# Patient Record
Sex: FEMALE | Race: BLACK OR AFRICAN AMERICAN | Employment: OTHER | URBAN - METROPOLITAN AREA
[De-identification: names, ages, dates, MRNs, and addresses within clinical notes are randomized per-mention and may not be internally consistent; named-entity substitution may affect disease eponyms.]

---

## 2017-02-21 ENCOUNTER — OP HISTORICAL/CONVERTED ENCOUNTER (OUTPATIENT)
Dept: OTHER | Age: 77
End: 2017-02-21

## 2017-04-04 ENCOUNTER — ED HISTORICAL/CONVERTED ENCOUNTER (OUTPATIENT)
Dept: OTHER | Age: 77
End: 2017-04-04

## 2017-04-19 ENCOUNTER — OP HISTORICAL/CONVERTED ENCOUNTER (OUTPATIENT)
Dept: OTHER | Age: 77
End: 2017-04-19

## 2017-08-17 ENCOUNTER — OP HISTORICAL/CONVERTED ENCOUNTER (OUTPATIENT)
Dept: OTHER | Age: 77
End: 2017-08-17

## 2018-04-06 ENCOUNTER — ED HISTORICAL/CONVERTED ENCOUNTER (OUTPATIENT)
Dept: OTHER | Age: 78
End: 2018-04-06

## 2018-04-12 ENCOUNTER — OP HISTORICAL/CONVERTED ENCOUNTER (OUTPATIENT)
Dept: OTHER | Age: 78
End: 2018-04-12

## 2018-04-29 ENCOUNTER — IP HISTORICAL/CONVERTED ENCOUNTER (OUTPATIENT)
Dept: OTHER | Age: 78
End: 2018-04-29

## 2018-05-24 ENCOUNTER — OP HISTORICAL/CONVERTED ENCOUNTER (OUTPATIENT)
Dept: OTHER | Age: 78
End: 2018-05-24

## 2018-05-25 ENCOUNTER — OP HISTORICAL/CONVERTED ENCOUNTER (OUTPATIENT)
Dept: OTHER | Age: 78
End: 2018-05-25

## 2018-06-13 ENCOUNTER — OP HISTORICAL/CONVERTED ENCOUNTER (OUTPATIENT)
Dept: OTHER | Age: 78
End: 2018-06-13

## 2018-08-23 ENCOUNTER — OP HISTORICAL/CONVERTED ENCOUNTER (OUTPATIENT)
Dept: OTHER | Age: 78
End: 2018-08-23

## 2018-10-13 ENCOUNTER — ED HISTORICAL/CONVERTED ENCOUNTER (OUTPATIENT)
Dept: OTHER | Age: 78
End: 2018-10-13

## 2019-01-16 ENCOUNTER — ED HISTORICAL/CONVERTED ENCOUNTER (OUTPATIENT)
Dept: OTHER | Age: 79
End: 2019-01-16

## 2019-03-02 ENCOUNTER — ED HISTORICAL/CONVERTED ENCOUNTER (OUTPATIENT)
Dept: OTHER | Age: 79
End: 2019-03-02

## 2019-08-22 ENCOUNTER — ED HISTORICAL/CONVERTED ENCOUNTER (OUTPATIENT)
Dept: OTHER | Age: 79
End: 2019-08-22

## 2019-12-09 ENCOUNTER — ED HISTORICAL/CONVERTED ENCOUNTER (OUTPATIENT)
Dept: OTHER | Age: 79
End: 2019-12-09

## 2020-11-24 ENCOUNTER — HOSPITAL ENCOUNTER (EMERGENCY)
Age: 80
Discharge: HOME | End: 2020-11-24
Attending: EMERGENCY MEDICINE
Payer: MEDICARE

## 2020-11-24 VITALS
SYSTOLIC BLOOD PRESSURE: 150 MMHG | BODY MASS INDEX: 44.9 KG/M2 | WEIGHT: 244 LBS | HEIGHT: 62 IN | RESPIRATION RATE: 20 BRPM | TEMPERATURE: 97.9 F | DIASTOLIC BLOOD PRESSURE: 62 MMHG | HEART RATE: 62 BPM

## 2020-11-24 DIAGNOSIS — J01.90 ACUTE NON-RECURRENT SINUSITIS, UNSPECIFIED LOCATION: ICD-10-CM

## 2020-11-24 DIAGNOSIS — J30.9 ALLERGIC RHINITIS, UNSPECIFIED SEASONALITY, UNSPECIFIED TRIGGER: Primary | ICD-10-CM

## 2020-11-24 PROCEDURE — 99282 EMERGENCY DEPT VISIT SF MDM: CPT

## 2020-11-24 RX ORDER — NITROGLYCERIN 0.3 MG/1
0.3 TABLET SUBLINGUAL
COMMUNITY

## 2020-11-24 RX ORDER — LOSARTAN POTASSIUM 100 MG/1
100 TABLET ORAL DAILY
COMMUNITY
End: 2021-01-06 | Stop reason: ALTCHOICE

## 2020-11-24 RX ORDER — ACETAMINOPHEN 325 MG/1
650 TABLET ORAL
COMMUNITY
End: 2021-12-15 | Stop reason: DRUGHIGH

## 2020-11-24 RX ORDER — BISMUTH SUBSALICYLATE 262 MG
1 TABLET,CHEWABLE ORAL DAILY
COMMUNITY

## 2020-11-24 RX ORDER — PRAVASTATIN SODIUM 40 MG/1
40 TABLET ORAL DAILY
COMMUNITY
End: 2021-05-19 | Stop reason: SDUPTHER

## 2020-11-24 RX ORDER — ALBUTEROL SULFATE 0.63 MG/3ML
0.63 SOLUTION RESPIRATORY (INHALATION)
COMMUNITY
End: 2022-07-09

## 2020-11-24 RX ORDER — GABAPENTIN 100 MG/1
CAPSULE ORAL 3 TIMES DAILY
COMMUNITY
End: 2022-07-19 | Stop reason: ALTCHOICE

## 2020-11-24 RX ORDER — AZITHROMYCIN 250 MG/1
TABLET, FILM COATED ORAL
Qty: 6 TAB | Refills: 0 | Status: SHIPPED | OUTPATIENT
Start: 2020-11-24 | End: 2020-11-29

## 2020-11-24 RX ORDER — POTASSIUM CHLORIDE 750 MG/1
10 CAPSULE, EXTENDED RELEASE ORAL DAILY
COMMUNITY
End: 2021-11-15 | Stop reason: SDUPTHER

## 2020-11-24 RX ORDER — CARVEDILOL 12.5 MG/1
TABLET ORAL 2 TIMES DAILY WITH MEALS
COMMUNITY
End: 2022-06-09 | Stop reason: SDUPTHER

## 2020-11-24 RX ORDER — FLUTICASONE PROPIONATE 50 MCG
2 SPRAY, SUSPENSION (ML) NASAL DAILY
COMMUNITY

## 2020-11-24 RX ORDER — CALCIUM CARBONATE 200(500)MG
1 TABLET,CHEWABLE ORAL DAILY
COMMUNITY
End: 2022-07-19

## 2020-11-24 RX ORDER — ISOSORBIDE MONONITRATE 30 MG/1
30 TABLET, EXTENDED RELEASE ORAL DAILY
COMMUNITY

## 2020-11-24 RX ORDER — ALBUTEROL SULFATE 90 UG/1
2 AEROSOL, METERED RESPIRATORY (INHALATION)
COMMUNITY
End: 2022-04-07 | Stop reason: SDUPTHER

## 2020-11-24 RX ORDER — OMEPRAZOLE 20 MG/1
20 CAPSULE, DELAYED RELEASE ORAL DAILY
COMMUNITY
End: 2021-01-14 | Stop reason: SDUPTHER

## 2020-11-24 RX ORDER — BUDESONIDE AND FORMOTEROL FUMARATE DIHYDRATE 80; 4.5 UG/1; UG/1
2 AEROSOL RESPIRATORY (INHALATION) 2 TIMES DAILY
COMMUNITY

## 2020-11-24 RX ORDER — ALLOPURINOL 300 MG/1
300 TABLET ORAL DAILY
COMMUNITY
End: 2022-01-06 | Stop reason: SDUPTHER

## 2020-11-24 RX ORDER — GUAIFENESIN 100 MG/5ML
81 LIQUID (ML) ORAL DAILY
COMMUNITY

## 2020-11-24 RX ORDER — AMMONIUM LACTATE 12 G/100G
LOTION TOPICAL AS NEEDED
COMMUNITY
End: 2022-04-07

## 2020-11-24 NOTE — ED TRIAGE NOTES
Sinus congestion, and drainage since Nov 11, has been using symbicort and flonase; worse in the mornings when she wakes up; dry cough; scratchy throat; no fever

## 2020-11-24 NOTE — ED PROVIDER NOTES
EMERGENCY DEPARTMENT HISTORY AND PHYSICAL EXAM      Date: 11/24/2020  Patient Name: Estela Davis    History of Presenting Illness     Chief Complaint   Patient presents with    Nasal Congestion       History Provided By: Patient    HPI: Estela Davis, [de-identified] y.o. female with a past medical history significant for multiple medical problems including HTN and allergic rhinitis presents to the ED with cc of sinus pain and post nasal drip for 2 weeks. She is taking multiple medications without relief, including Flonase spray, Zyrtec, over the counter decongestants, and saline nasal rinses. She denies any headache, dizziness, visual change, fever, chest pain or shortness of breath. PCP: No primary care provider on file. No current facility-administered medications on file prior to encounter. Current Outpatient Medications on File Prior to Encounter   Medication Sig Dispense Refill    acetaminophen (TylenoL) 325 mg tablet Take 650 mg by mouth every four (4) hours as needed for Pain.  albuterol (ACCUNEB) 0.63 mg/3 mL nebulizer solution 0.63 mg by Nebulization route every six (6) hours as needed for Wheezing.  albuterol (ProAir HFA) 90 mcg/actuation inhaler Take 2 Puffs by inhalation every four (4) hours as needed for Wheezing.  allopurinoL (ZYLOPRIM) 300 mg tablet Take 300 mg by mouth daily.  ammonium lactate (LAC-HYDRIN) 12 % lotion Apply  to affected area as needed. rub in to affected area well      aspirin 81 mg chewable tablet Take 81 mg by mouth daily.  budesonide-formoteroL (Symbicort) 80-4.5 mcg/actuation HFAA Take 2 Puffs by inhalation two (2) times a day.  calcium carbonate (TUMS) 200 mg calcium (500 mg) chew Take 1 Tab by mouth daily.  carvediloL (Coreg) 12.5 mg tablet Take  by mouth two (2) times daily (with meals).  fluticasone propionate (Flonase Allergy Relief) 50 mcg/actuation nasal spray 2 Sprays by Both Nostrils route daily.       gabapentin (NEURONTIN) 100 mg capsule Take  by mouth three (3) times daily.  losartan 50 mg tab 100 mg, hydroCHLOROthiazide 25 mg tab 25 mg Take 1 Dose by mouth daily.  isosorbide mononitrate ER (IMDUR) 30 mg tablet Take 30 mg by mouth daily.  losartan (COZAAR) 100 mg tablet Take 100 mg by mouth daily.  multivitamin (ONE A DAY) tablet Take 1 Tab by mouth daily.  nitroglycerin (Nitrostat) 0.3 mg SL tablet 0.3 mg by SubLINGual route every five (5) minutes as needed for Chest Pain.  omeprazole (PRILOSEC) 20 mg capsule Take 20 mg by mouth daily.  potassium chloride SA (MICRO-K) 10 mEq capsule Take 10 mEq by mouth daily.  pravastatin (PRAVACHOL) 40 mg tablet Take 40 mg by mouth daily.  psyllium (METAMUCIL) powd Take 525 mg by mouth daily. Past History     Past Medical History:  Past Medical History:   Diagnosis Date    Allergic rhinitis     Angina pectoris (HCC)     Arthritis     Cardiac murmur     Chronic ischemic heart disease     Diabetes (HCC)     Edema of left lower leg     GERD (gastroesophageal reflux disease)     Hyperlipidemia     Hypertension     Knee pain     Left tibialis posterior tendinitis     Lumbar radiculopathy     Morbid obesity (HCC)     Osteoarthritis     Sarcoidosis     Shortness of breath     Spondylosis        Past Surgical History:  History reviewed. No pertinent surgical history. Family History:  History reviewed. No pertinent family history. Social History:  Social History     Tobacco Use    Smoking status: Never Smoker    Smokeless tobacco: Never Used   Substance Use Topics    Alcohol use: Never     Frequency: Never    Drug use: Never       Allergies: Allergies   Allergen Reactions    Clindamycin Seizures         Review of Systems   Review of Systems   Constitutional: Negative for fever. Respiratory: Negative for cough and shortness of breath. Neurological: Negative for headaches.    All other systems reviewed and are negative. Physical Exam   Physical Exam  Vitals signs and nursing note reviewed. Constitutional:       Appearance: Normal appearance. She is not ill-appearing. HENT:      Head: Normocephalic and atraumatic. Right Ear: Tympanic membrane and ear canal normal.      Left Ear: Tympanic membrane and ear canal normal.      Nose: Nose normal.      Mouth/Throat:      Mouth: Mucous membranes are moist.      Pharynx: No oropharyngeal exudate or posterior oropharyngeal erythema. Eyes:      General: No scleral icterus. Extraocular Movements: Extraocular movements intact. Pupils: Pupils are equal, round, and reactive to light. Neck:      Musculoskeletal: Normal range of motion and neck supple. No neck rigidity. Cardiovascular:      Rate and Rhythm: Normal rate and regular rhythm. Pulses: Normal pulses. Heart sounds: Normal heart sounds. Pulmonary:      Effort: Pulmonary effort is normal.      Breath sounds: Normal breath sounds. No wheezing, rhonchi or rales. Abdominal:      General: Bowel sounds are normal.      Palpations: Abdomen is soft. Tenderness: There is no abdominal tenderness. Musculoskeletal: Normal range of motion. Right lower leg: No edema. Left lower leg: No edema. Lymphadenopathy:      Cervical: No cervical adenopathy. Skin:     General: Skin is warm and dry. Findings: No rash. Neurological:      General: No focal deficit present. Mental Status: She is alert and oriented to person, place, and time. Comments: Nl gross motor/sensory exam without any focal or lateralizing deficits   Psychiatric:         Mood and Affect: Mood normal.         Behavior: Behavior normal.         Diagnostic Study Results     Labs -   No results found for this or any previous visit (from the past 12 hour(s)).     Radiologic Studies -   No orders to display     CT Results  (Last 48 hours)    None        CXR Results  (Last 48 hours)    None            Medical Decision Making   I am the first provider for this patient. I reviewed the vital signs, available nursing notes, past medical history, past surgical history, family history and social history. Vital Signs-Reviewed the patient's vital signs. Patient Vitals for the past 12 hrs:   Temp Pulse Resp BP   11/24/20 1014 97.9 °F (36.6 °C) 62 20 (!) 150/62       Records Reviewed: Nursing Notes    Provider Notes (Medical Decision Making):   Pt with benign exam.  She has had 2 weeks of symptoms with no relief with multiple medications. ED Course:   Initial assessment performed. The patients presenting problems have been discussed, and they are in agreement with the care plan formulated and outlined with them. I have encouraged them to ask questions as they arise throughout their visit. Rx for Z pack, continue sx treatment. Discussed worrisome symptoms to return for and ENT referral.    PLAN:  1. Current Discharge Medication List      START taking these medications    Details   azithromycin (Zithromax Z-Ruben) 250 mg tablet Take as per package instructions  Qty: 6 Tab, Refills: 0           2. Follow-up Information     Follow up With Specialties Details Why Contact Info    Waldo Hayward MD Otolaryngology   [address] [place] [address]  [phone]          Return to ED if worse     Diagnosis     Clinical Impression:   1. Allergic rhinitis, unspecified seasonality, unspecified trigger    2.  Acute non-recurrent sinusitis, unspecified location

## 2021-01-06 ENCOUNTER — OFFICE VISIT (OUTPATIENT)
Dept: INTERNAL MEDICINE CLINIC | Age: 81
End: 2021-01-06
Payer: MEDICARE

## 2021-01-06 ENCOUNTER — OFFICE VISIT (OUTPATIENT)
Dept: PODIATRY | Age: 81
End: 2021-01-06
Payer: MEDICARE

## 2021-01-06 VITALS
WEIGHT: 249.2 LBS | HEIGHT: 61 IN | HEART RATE: 78 BPM | BODY MASS INDEX: 47.05 KG/M2 | OXYGEN SATURATION: 97 % | TEMPERATURE: 95.1 F | SYSTOLIC BLOOD PRESSURE: 147 MMHG | DIASTOLIC BLOOD PRESSURE: 74 MMHG

## 2021-01-06 VITALS
DIASTOLIC BLOOD PRESSURE: 80 MMHG | HEIGHT: 61 IN | OXYGEN SATURATION: 98 % | SYSTOLIC BLOOD PRESSURE: 150 MMHG | BODY MASS INDEX: 46.82 KG/M2 | HEART RATE: 72 BPM | WEIGHT: 248 LBS | RESPIRATION RATE: 18 BRPM

## 2021-01-06 DIAGNOSIS — M1A.9XX0 CHRONIC GOUT WITHOUT TOPHUS, UNSPECIFIED CAUSE, UNSPECIFIED SITE: ICD-10-CM

## 2021-01-06 DIAGNOSIS — J30.9 ALLERGIC RHINITIS, UNSPECIFIED SEASONALITY, UNSPECIFIED TRIGGER: ICD-10-CM

## 2021-01-06 DIAGNOSIS — M54.16 LUMBAR RADICULOPATHY: ICD-10-CM

## 2021-01-06 DIAGNOSIS — J45.20 MILD INTERMITTENT ASTHMA WITHOUT COMPLICATION: ICD-10-CM

## 2021-01-06 DIAGNOSIS — E55.9 VITAMIN D DEFICIENCY: ICD-10-CM

## 2021-01-06 DIAGNOSIS — D86.9 SARCOIDOSIS: ICD-10-CM

## 2021-01-06 DIAGNOSIS — E78.00 PURE HYPERCHOLESTEROLEMIA: ICD-10-CM

## 2021-01-06 DIAGNOSIS — B35.1 ONYCHOMYCOSIS: ICD-10-CM

## 2021-01-06 DIAGNOSIS — E66.01 MORBID OBESITY (HCC): ICD-10-CM

## 2021-01-06 DIAGNOSIS — M25.572 CHRONIC PAIN OF LEFT ANKLE: Primary | ICD-10-CM

## 2021-01-06 DIAGNOSIS — I25.9 ISCHEMIC HEART DISEASE: ICD-10-CM

## 2021-01-06 DIAGNOSIS — G89.29 CHRONIC PAIN OF LEFT ANKLE: Primary | ICD-10-CM

## 2021-01-06 DIAGNOSIS — K21.9 GASTROESOPHAGEAL REFLUX DISEASE WITHOUT ESOPHAGITIS: ICD-10-CM

## 2021-01-06 DIAGNOSIS — Z12.31 ENCOUNTER FOR SCREENING MAMMOGRAM FOR BREAST CANCER: ICD-10-CM

## 2021-01-06 DIAGNOSIS — M15.9 OSTEOARTHRITIS OF MULTIPLE JOINTS, UNSPECIFIED OSTEOARTHRITIS TYPE: ICD-10-CM

## 2021-01-06 DIAGNOSIS — I10 ESSENTIAL HYPERTENSION: ICD-10-CM

## 2021-01-06 DIAGNOSIS — R73.03 PREDIABETES: ICD-10-CM

## 2021-01-06 PROCEDURE — 99214 OFFICE O/P EST MOD 30 MIN: CPT | Performed by: INTERNAL MEDICINE

## 2021-01-06 PROCEDURE — 29540 STRAPPING ANKLE &/FOOT: CPT | Performed by: PODIATRIST

## 2021-01-06 PROCEDURE — 99204 OFFICE O/P NEW MOD 45 MIN: CPT | Performed by: PODIATRIST

## 2021-01-06 PROCEDURE — 11755 BIOPSY NAIL UNIT: CPT | Performed by: PODIATRIST

## 2021-01-06 RX ORDER — DICLOFENAC SODIUM 10 MG/G
4 GEL TOPICAL 4 TIMES DAILY
Qty: 450 G | Refills: 2 | Status: SHIPPED | OUTPATIENT
Start: 2021-01-06 | End: 2021-04-14 | Stop reason: SDUPTHER

## 2021-01-06 RX ORDER — LOSARTAN POTASSIUM AND HYDROCHLOROTHIAZIDE 25; 100 MG/1; MG/1
TABLET ORAL
COMMUNITY
Start: 2020-10-10 | End: 2021-05-03 | Stop reason: SDUPTHER

## 2021-01-06 NOTE — PROGRESS NOTES
Stan Beltre is a [de-identified] y.o. female and presents with Follow Up Chronic Condition (htn ) and Other (drainage, congestion)    Ms. Jennifer Meier came for to reestablish and for follow-up almost after 2 years. She is brought by her niece. she does not drive. According to her in the interval time she was living in [place] with her friend due to some personal problems and now she has started living with her son. She has multiple medical problems. She brought the list of medicines. She follows Dr. Arslan Rojas cardiologist once a year. She is a known case of ischemic heart disease with coronary artery disease and having asthma with history of sarcoidosis but her sarcoidosis is quiet. Her blood pressure is elevated because she has not taken any of her medicines yet. She has hypertension with history of gout and she has nebulizer machine and using rescue and maintenance inhaler. Recently no flareup of gout. She is using fluticasone nasal spray and cetirizine. Recently she visited emergency room 3 weeks ago for cough and she was prescribed azithromycin. She has history of lumbar radiculopathy and DJD also had history of prediabetes with hemoglobin A1c 6.6 at 1 time in the past.  She has not done any labs for last 1 year. Labs ordered. She wants to do mammogram.  She has no depression. Her BMI is 46.8. Hypertension blood pressure readings are elevated    Review of Systems    Review of Systems   Constitutional: Negative. HENT: Negative for congestion and sore throat. Eyes: Negative for blurred vision. Respiratory: Negative for cough, shortness of breath, wheezing and stridor. Cardiovascular: Negative. Gastrointestinal: Negative. Genitourinary: Negative. Musculoskeletal: Negative. Neurological: Negative for dizziness, tingling, tremors and headaches. Psychiatric/Behavioral: Negative for depression and memory loss. The patient is not nervous/anxious and does not have insomnia.          Past Medical History:   Diagnosis Date    Allergic rhinitis     Angina pectoris (HCC)     Arthritis     Cardiac murmur     Chronic ischemic heart disease     Diabetes (HCC)     Edema of left lower leg     GERD (gastroesophageal reflux disease)     Hyperlipidemia     Hypertension     Knee pain     Left tibialis posterior tendinitis     Lumbar radiculopathy     Morbid obesity (HCC)     Osteoarthritis     Sarcoidosis     Shortness of breath     Spondylosis      Past Surgical History:   Procedure Laterality Date    HX CHOLECYSTECTOMY      HX CYST REMOVAL      IR CHOLECYSTOSTOMY PERCUTANEOUS       Social History     Socioeconomic History    Marital status:      Spouse name: Not on file    Number of children: Not on file    Years of education: Not on file    Highest education level: Not on file   Tobacco Use    Smoking status: Never Smoker    Smokeless tobacco: Never Used   Substance and Sexual Activity    Alcohol use: Never     Frequency: Never    Drug use: Never     Family History   Family history unknown: Yes     Current Outpatient Medications   Medication Sig Dispense Refill    losartan-hydroCHLOROthiazide (HYZAAR) 100-25 mg per tablet TAKE 1 TABLET BY MOUTH ONCE DAILY FOR 90 DAYS      diclofenac (VOLTAREN) 1 % gel Apply 4 g to affected area four (4) times daily. 450 g 2    acetaminophen (TylenoL) 325 mg tablet Take 650 mg by mouth every four (4) hours as needed for Pain.  albuterol (ACCUNEB) 0.63 mg/3 mL nebulizer solution 0.63 mg by Nebulization route every six (6) hours as needed for Wheezing.  albuterol (ProAir HFA) 90 mcg/actuation inhaler Take 2 Puffs by inhalation every four (4) hours as needed for Wheezing.  allopurinoL (ZYLOPRIM) 300 mg tablet Take 300 mg by mouth daily.  ammonium lactate (LAC-HYDRIN) 12 % lotion Apply  to affected area as needed. rub in to affected area well      aspirin 81 mg chewable tablet Take 81 mg by mouth daily.       budesonide-formoteroL (Symbicort) 80-4.5 mcg/actuation HFAA Take 2 Puffs by inhalation two (2) times a day.  calcium carbonate (TUMS) 200 mg calcium (500 mg) chew Take 1 Tab by mouth daily.  carvediloL (Coreg) 12.5 mg tablet Take  by mouth two (2) times daily (with meals).  fluticasone propionate (Flonase Allergy Relief) 50 mcg/actuation nasal spray 2 Sprays by Both Nostrils route daily.  gabapentin (NEURONTIN) 100 mg capsule Take  by mouth three (3) times daily.  isosorbide mononitrate ER (IMDUR) 30 mg tablet Take 30 mg by mouth daily.  multivitamin (ONE A DAY) tablet Take 1 Tab by mouth daily.  nitroglycerin (Nitrostat) 0.3 mg SL tablet 0.3 mg by SubLINGual route every five (5) minutes as needed for Chest Pain.  omeprazole (PRILOSEC) 20 mg capsule Take 20 mg by mouth daily.  potassium chloride SA (MICRO-K) 10 mEq capsule Take 10 mEq by mouth daily.  pravastatin (PRAVACHOL) 40 mg tablet Take 40 mg by mouth daily.  psyllium (METAMUCIL) powd Take 525 mg by mouth daily. Allergies   Allergen Reactions    Clindamycin Seizures       Objective:  Visit Vitals  BP (!) 150/80 (BP 1 Location: Right arm, BP Patient Position: Sitting)   Pulse 72   Resp 18   Ht 5' 1\" (1.549 m)   Wt 248 lb (112.5 kg)   SpO2 98%   BMI 46.86 kg/m²       Physical Exam:   Constitutional: General Appearance: . Level of Distress: NAD. Psychiatric: Mental Status: normal mood and affect Orientation: to time, place, and person. ,normal eye contact. Head: Head: normocephalic and atraumatic. Eyes: Pupils: PERRLA. Sclerae: non-icteric. Neck: Neck: supple, trachea midline, and no masses. Lymph Nodes: no cervical LAD. Thyroid: no enlargement or nodules and non-tender. Lungs: Respiratory effort: no dyspnea. Auscultation: no wheezing, rales/crackles, or rhonchi and breath sounds normal and good air movement. Cardiovascular: Apical Impulse: not displaced.  Heart Auscultation: normal S1 and S2; no murmurs, rubs, or gallops; and RRR. Neck vessels: no carotid bruits. Pulses including femoral / pedal: normal throughout. Abdomen: Bowel Sounds: normal. Inspection and Palpation: no tenderness, guarding, or masses and soft and non-distended. Liver: non-tender and no hepatomegaly. Spleen: non-tender and no splenomegaly. Musculoskeletal[de-identified] Extremities: no edema,no varicosities. No Calf tenderness. Neurologic: Gait and Station: normal gait and station. Motor Strength normal right and left. Skin: Inspection and palpation: no rash, lesions, or ulcer. No results found for this or any previous visit. Assessment/Plan:      ICD-10-CM ICD-9-CM    1. Essential hypertension  I10 401.9 CBC WITH AUTOMATED DIFF      METABOLIC PANEL, COMPREHENSIVE      URINALYSIS W/ RFLX MICROSCOPIC      TSH 3RD GENERATION   2. Pure hypercholesterolemia  E78.00 272.0 LIPID PANEL   3. Mild intermittent asthma without complication  A83.19 076.29    4. Chronic gout without tophus, unspecified cause, unspecified site  M1A. 9XX0 274.02 URIC ACID   5. Allergic rhinitis, unspecified seasonality, unspecified trigger  J30.9 477.9    6. Gastroesophageal reflux disease without esophagitis  K21.9 530.81    7. Ischemic heart disease  I25.9 414.9    8. Prediabetes  R73.03 790.29 HEMOGLOBIN A1C WITH EAG   9. Osteoarthritis of multiple joints, unspecified osteoarthritis type  M15.9 715.89    10. Morbid obesity ([address]  E66.01 278.01    11. Lumbar radiculopathy  M54.16 724.4    12. Sarcoidosis  D86.9 135    13. Vitamin D deficiency  E55.9 268.9 VITAMIN D, 25 HYDROXY   14.  Encounter for screening mammogram for breast cancer  Z12.31 V76.12 KELVIN 3D GALO W MAMMO BI SCREENING INCL CAD     Orders Placed This Encounter    KELVIN 3D GALO W MAMMO BI SCREENING INCL CAD     Standing Status:   Future     Standing Expiration Date:   7/6/2021    CBC WITH AUTOMATED DIFF    METABOLIC PANEL, COMPREHENSIVE    LIPID PANEL    HEMOGLOBIN A1C WITH EAG    URINALYSIS W/ RFLX MICROSCOPIC    TSH 3RD GENERATION    VITAMIN D, 25 HYDROXY    URIC ACID       Hypertension her blood pressure reading is elevated she has not taken her medicine,. Today she had to come here so she has not taken her medicine. Continue carvedilol 12.5 mg twice a day. Continue losartan hydrochlorothiazide 100/25 mg once a day. Hypercholesterolemia continue pravastatin 40 mg at bedtime. CAD with history of atherosclerotic heart disease under care of cardiologist Dr. Alan Ponce and follows her once a year. Continue isosorbide mononitrate ER 30 mg once a day. Heart healthy diet. GERD continue omeprazole. Gout continue allopurinol 300 mg once a day. Labs ordered. Diet low in purine. Asthma mild intermittent no recent flareup continue rescue and maintenance inhaler and she has nebulizer machine. History of DJD in the back and lumbar radiculopathy taking gabapentin. History of hypokalemia taking potassium supplementation. History of constipation,She does not want to take flu vaccine. Continue laxative and diet rich in fibers. Fasting labs ordered. Awaiting notes from cardiologist.  Follow-up in 3 months. Screening mammogram ordered. lose weight, follow low fat diet, follow low salt diet, continue present plan, routine labs ordered, call if any problems    There are no Patient Instructions on file for this visit. Follow-up and Dispositions    · Return in about 3 months (around 4/6/2021) for multiple comorbidities ,fasting labs now and mammo.

## 2021-01-06 NOTE — PROGRESS NOTES
[facility]    Subjective:         Patient is a [de-identified] y.o. female who is being seen as a new pt for left ankle pain and thick/discolored toenails. Patient states she has been suffering from ankle pain for the past few months. States the pain rises to the level of 3 out of 10. She denies any trauma to the area. She states the pain is sharp in nature and shoots up the lateral side of her leg. States she does not take any medication for pain management. She states imaging has not been performed to confirm a diagnosis. As far as her toenails, she also confirms that testing has never been performed to confirm a diagnosis. She states she tried multiple over-the-counter medications without relief of her symptoms. She denies any systemic signs of infection. She denies any other pedal complaints    Past Medical History:   Diagnosis Date    Allergic rhinitis     Angina pectoris (HCC)     Arthritis     Cardiac murmur     Chronic ischemic heart disease     Diabetes ([address]     Edema of left lower leg     GERD (gastroesophageal reflux disease)     Hyperlipidemia     Hypertension     Knee pain     Left tibialis posterior tendinitis     Lumbar radiculopathy     Morbid obesity (HCC)     Osteoarthritis     Sarcoidosis     Shortness of breath     Spondylosis      Past Surgical History:   Procedure Laterality Date    HX CHOLECYSTECTOMY      HX CYST REMOVAL      IR CHOLECYSTOSTOMY PERCUTANEOUS         Family History   Family history unknown: Yes      Social History     Tobacco Use    Smoking status: Never Smoker    Smokeless tobacco: Never Used   Substance Use Topics    Alcohol use: Never     Frequency: Never     Allergies   Allergen Reactions    Clindamycin Seizures     Prior to Admission medications    Medication Sig Start Date End Date Taking?  Authorizing Provider   losartan-hydroCHLOROthiazide (HYZAAR) 100-25 mg per tablet TAKE 1 TABLET BY MOUTH ONCE DAILY FOR 90 DAYS 10/10/20 Yes Provider, Renny   diclofenac (VOLTAREN) 1 % gel Apply 4 g to affected area four (4) times daily. 1/6/21  Yes Barry Bearden DPM   acetaminophen (TylenoL) 325 mg tablet Take 650 mg by mouth every four (4) hours as needed for Pain. Yes Navarro, MD Neda   albuterol (ACCUNEB) 0.63 mg/3 mL nebulizer solution 0.63 mg by Nebulization route every six (6) hours as needed for Wheezing. Yes Navarro, MD Neda   albuterol (ProAir HFA) 90 mcg/actuation inhaler Take 2 Puffs by inhalation every four (4) hours as needed for Wheezing. Yes Navarro, MD Neda   allopurinoL (ZYLOPRIM) 300 mg tablet Take 300 mg by mouth daily. Yes Navarro, MD Neda   ammonium lactate (LAC-HYDRIN) 12 % lotion Apply  to affected area as needed. rub in to affected area well   Yes Navarro, MD Neda   aspirin 81 mg chewable tablet Take 81 mg by mouth daily. Yes Navarro, MD Neda   budesonide-formoteroL (Symbicort) 80-4.5 mcg/actuation HFAA Take 2 Puffs by inhalation two (2) times a day. Yes Navarro, MD Neda   calcium carbonate (TUMS) 200 mg calcium (500 mg) chew Take 1 Tab by mouth daily. Yes Navarro, MD Neda   carvediloL (Coreg) 12.5 mg tablet Take  by mouth two (2) times daily (with meals). Yes Navarro, MD Neda   fluticasone propionate (Flonase Allergy Relief) 50 mcg/actuation nasal spray 2 Sprays by Both Nostrils route daily. Yes Navarro, MD Neda   gabapentin (NEURONTIN) 100 mg capsule Take  by mouth three (3) times daily. Yes Navarro, MD Neda   losartan 50 mg tab 100 mg, hydroCHLOROthiazide 25 mg tab 25 mg Take 1 Dose by mouth daily. Yes Navarro, MD Neda   isosorbide mononitrate ER (IMDUR) 30 mg tablet Take 30 mg by mouth daily. Yes Navarro, MD Neda   losartan (COZAAR) 100 mg tablet Take 100 mg by mouth daily. Yes Navarro, MD Neda   multivitamin (ONE A DAY) tablet Take 1 Tab by mouth daily. Yes Other, MD Neda   nitroglycerin (Nitrostat) 0.3 mg SL tablet 0.3 mg by SubLINGual route every five (5) minutes as needed for Chest Pain. Yes Other, MD Neda   omeprazole (PRILOSEC) 20 mg capsule Take 20 mg by mouth daily. Yes Other, MD Neda   potassium chloride SA (MICRO-K) 10 mEq capsule Take 10 mEq by mouth daily. Yes Other, MD Neda   pravastatin (PRAVACHOL) 40 mg tablet Take 40 mg by mouth daily. Yes Other, MD Neda   psyllium (METAMUCIL) powd Take 525 mg by mouth daily. Yes Other, MD Neda       Review of Systems   Constitutional: Negative. HENT: Negative. Eyes: Negative. Respiratory: Negative. Cardiovascular: Negative. Gastrointestinal: Negative. Endocrine: Negative. Genitourinary: Negative. Musculoskeletal: Positive for arthralgias. Skin: Negative. Allergic/Immunologic: Positive for environmental allergies. Neurological: Positive for numbness. Hematological: Negative. Psychiatric/Behavioral: Negative. All other systems reviewed and are negative. Objective:     Visit Vitals  BP (!) 147/74 (BP 1 Location: Left arm, BP Patient Position: Sitting)   Pulse 78   Temp (!) 95.1 °F (35.1 °C) (Oral)   Ht 5' 1\" (1.549 m)   Wt 249 lb 3.2 oz (113 kg)   SpO2 97%   BMI 47.09 kg/m²       Physical Exam  Vitals signs reviewed. Constitutional:       Appearance: She is morbidly obese. Cardiovascular:      Pulses:           Dorsalis pedis pulses are 2+ on the right side and 2+ on the left side. Posterior tibial pulses are 2+ on the right side and 2+ on the left side. Pulmonary:      Effort: Pulmonary effort is normal.   Musculoskeletal:      Right ankle: Normal.      Left ankle: She exhibits decreased range of motion and swelling. Tenderness. Right lower leg: No edema. Left lower leg: Edema present. Right foot: Normal range of motion. No deformity or bunion. Left foot: Normal range of motion. No deformity or bunion. Feet:      Right foot:      Protective Sensation: 10 sites tested. 10 sites sensed.       Skin integrity: Skin integrity normal.      Toenail Condition: Right toenails are abnormally thick. Fungal disease present. Left foot:      Protective Sensation: 10 sites tested. 10 sites sensed. Skin integrity: Skin integrity normal.      Toenail Condition: Left toenails are abnormally thick. Fungal disease present. Lymphadenopathy:      Lower Body: No right inguinal adenopathy. No left inguinal adenopathy. Skin:     General: Skin is warm. Capillary Refill: Capillary refill takes 2 to 3 seconds. Neurological:      Mental Status: She is alert and oriented to person, place, and time. Psychiatric:         Mood and Affect: Mood and affect normal.         Behavior: Behavior is cooperative. Data Review: No results found for this or any previous visit (from the past 24 hour(s)). Impression:       ICD-10-CM ICD-9-CM    1. Chronic pain of left ankle  M25.572 719.47 XR ANKLE LT MIN 3 V    G89.29 338.29    2. Onychomycosis  B35.1 110.1          Recommendation:     Patient seen and evaluated in the office  Discussed and educated patient regarding her current medical condition  An order was given for x-rays to be performed of the left ankle to interrogate the osseous structures. A foot/ankle strapping was applied with an Ace bandage for symptomatic relief. Patient to utilize as needed. A prescription was given for diclofenac 1% topical gel to be utilized up to 4 times daily for symptomatic relief of lateral ankle pain  It was determined that a nail plate biopsy were taken of the right hallux to confirm the presence of fungal/yeast elements. This was performed with a nail nipper without incident. Patient tolerated well no dressing was needed.   Informed patient that when pathology of return, will discuss treatment options in more depth

## 2021-01-07 LAB
25(OH)D3+25(OH)D2 SERPL-MCNC: 34.8 NG/ML (ref 30–100)
ALBUMIN SERPL-MCNC: 4.4 G/DL (ref 3.7–4.7)
ALBUMIN/GLOB SERPL: 1.4 {RATIO} (ref 1.2–2.2)
ALP SERPL-CCNC: 54 IU/L (ref 39–117)
ALT SERPL-CCNC: 15 IU/L (ref 0–32)
APPEARANCE UR: CLEAR
AST SERPL-CCNC: 19 IU/L (ref 0–40)
BACTERIA #/AREA URNS HPF: ABNORMAL /[HPF]
BASOPHILS # BLD AUTO: 0.1 X10E3/UL (ref 0–0.2)
BASOPHILS NFR BLD AUTO: 1 %
BILIRUB SERPL-MCNC: 0.4 MG/DL (ref 0–1.2)
BILIRUB UR QL STRIP: NEGATIVE
BUN SERPL-MCNC: 21 MG/DL (ref 8–27)
BUN/CREAT SERPL: 18 (ref 12–28)
CALCIUM SERPL-MCNC: 9.9 MG/DL (ref 8.7–10.3)
CASTS URNS QL MICRO: ABNORMAL /LPF
CHLORIDE SERPL-SCNC: 95 MMOL/L (ref 96–106)
CHOLEST SERPL-MCNC: 173 MG/DL (ref 100–199)
CO2 SERPL-SCNC: 31 MMOL/L (ref 20–29)
COLOR UR: YELLOW
CREAT SERPL-MCNC: 1.19 MG/DL (ref 0.57–1)
EOSINOPHIL # BLD AUTO: 0.4 X10E3/UL (ref 0–0.4)
EOSINOPHIL NFR BLD AUTO: 4 %
EPI CELLS #/AREA URNS HPF: ABNORMAL /HPF (ref 0–10)
ERYTHROCYTE [DISTWIDTH] IN BLOOD BY AUTOMATED COUNT: 15.6 % (ref 11.7–15.4)
EST. AVERAGE GLUCOSE BLD GHB EST-MCNC: 140 MG/DL
GLOBULIN SER CALC-MCNC: 3.1 G/DL (ref 1.5–4.5)
GLUCOSE SERPL-MCNC: 97 MG/DL (ref 65–99)
GLUCOSE UR QL: NEGATIVE
HBA1C MFR BLD: 6.5 % (ref 4.8–5.6)
HCT VFR BLD AUTO: 43.8 % (ref 34–46.6)
HDLC SERPL-MCNC: 63 MG/DL
HGB BLD-MCNC: 14 G/DL (ref 11.1–15.9)
HGB UR QL STRIP: ABNORMAL
IMM GRANULOCYTES # BLD AUTO: 0 X10E3/UL (ref 0–0.1)
IMM GRANULOCYTES NFR BLD AUTO: 0 %
KETONES UR QL STRIP: NEGATIVE
LDLC SERPL CALC-MCNC: 92 MG/DL (ref 0–99)
LEUKOCYTE ESTERASE UR QL STRIP: ABNORMAL
LYMPHOCYTES # BLD AUTO: 2.4 X10E3/UL (ref 0.7–3.1)
LYMPHOCYTES NFR BLD AUTO: 24 %
MCH RBC QN AUTO: 24.7 PG (ref 26.6–33)
MCHC RBC AUTO-ENTMCNC: 32 G/DL (ref 31.5–35.7)
MCV RBC AUTO: 77 FL (ref 79–97)
MICRO URNS: ABNORMAL
MONOCYTES # BLD AUTO: 0.8 X10E3/UL (ref 0.1–0.9)
MONOCYTES NFR BLD AUTO: 8 %
MUCOUS THREADS URNS QL MICRO: PRESENT
NEUTROPHILS # BLD AUTO: 6.5 X10E3/UL (ref 1.4–7)
NEUTROPHILS NFR BLD AUTO: 63 %
NITRITE UR QL STRIP: POSITIVE
PH UR STRIP: 6.5 [PH] (ref 5–7.5)
PLATELET # BLD AUTO: 276 X10E3/UL (ref 150–450)
POTASSIUM SERPL-SCNC: 3.8 MMOL/L (ref 3.5–5.2)
PROT SERPL-MCNC: 7.5 G/DL (ref 6–8.5)
PROT UR QL STRIP: NEGATIVE
RBC # BLD AUTO: 5.66 X10E6/UL (ref 3.77–5.28)
RBC #/AREA URNS HPF: ABNORMAL /HPF (ref 0–2)
SODIUM SERPL-SCNC: 141 MMOL/L (ref 134–144)
SP GR UR: 1.02 (ref 1–1.03)
TRIGL SERPL-MCNC: 97 MG/DL (ref 0–149)
TSH SERPL DL<=0.005 MIU/L-ACNC: 1.63 UIU/ML (ref 0.45–4.5)
URATE SERPL-MCNC: 4.9 MG/DL (ref 3.1–7.9)
UROBILINOGEN UR STRIP-MCNC: 0.2 MG/DL (ref 0.2–1)
VLDLC SERPL CALC-MCNC: 18 MG/DL (ref 5–40)
WBC # BLD AUTO: 10.1 X10E3/UL (ref 3.4–10.8)
WBC #/AREA URNS HPF: ABNORMAL /HPF (ref 0–5)

## 2021-01-08 NOTE — PROGRESS NOTES
Please call her that she has urinary tract infection with red blood cells andWhite blood cells in the urine. Chart does not say that she is allergic to penicillin please ask her again, if she is not then start Keflex 250 mg,, every 8 hourly for 5 days,. And keep  private hygiene good and otherwise    ,She has to take care of drinking enough water to prevent kidney damage her kidney function is slightly low 50% instead of 60% in about because of longstanding history of hypertension and     she is borderline diabetic at her age she can stop eating starch and sugar, and starch containing bread, rice, pasta, spaghetti, sugar-containing beverages like juice, regular soda, all kind of juice and sugar-containing pastries and sugar-containing cakes and cookies because   her hemoglobin A1c came 6.5. She should drink enough water and do not take too much excessive amount of ibuprofen or Aleve from over-the-counter.,      She told me she does not have any symptoms of urinary tract infection.   Her thyroid and vitamin D level is normal her gout level is normal.,

## 2021-01-11 RX ORDER — CEPHALEXIN 250 MG/1
250 CAPSULE ORAL 4 TIMES DAILY
Qty: 20 CAP | Refills: 0 | Status: SHIPPED | OUTPATIENT
Start: 2021-01-11 | End: 2021-01-16

## 2021-01-13 ENCOUNTER — HOSPITAL ENCOUNTER (OUTPATIENT)
Dept: GENERAL RADIOLOGY | Age: 81
Discharge: HOME | End: 2021-01-13
Payer: MEDICARE

## 2021-01-13 DIAGNOSIS — B35.1 ONYCHOMYCOSIS: Primary | ICD-10-CM

## 2021-01-13 DIAGNOSIS — G89.29 CHRONIC PAIN OF LEFT ANKLE: ICD-10-CM

## 2021-01-13 DIAGNOSIS — M25.572 CHRONIC PAIN OF LEFT ANKLE: ICD-10-CM

## 2021-01-13 PROCEDURE — 73610 X-RAY EXAM OF ANKLE: CPT

## 2021-01-13 RX ORDER — TERBINAFINE HYDROCHLORIDE 250 MG/1
250 TABLET ORAL DAILY
Qty: 90 TAB | Refills: 0 | Status: SHIPPED | OUTPATIENT
Start: 2021-01-13 | End: 2021-04-13

## 2021-01-14 RX ORDER — OMEPRAZOLE 20 MG/1
20 CAPSULE, DELAYED RELEASE ORAL DAILY
Qty: 90 CAP | Refills: 2 | Status: SHIPPED | OUTPATIENT
Start: 2021-01-14 | End: 2021-04-23 | Stop reason: ALTCHOICE

## 2021-02-05 PROBLEM — Z12.31 ENCOUNTER FOR SCREENING MAMMOGRAM FOR BREAST CANCER: Status: RESOLVED | Noted: 2021-01-06 | Resolved: 2021-02-05

## 2021-03-12 ENCOUNTER — OFFICE VISIT (OUTPATIENT)
Dept: INTERNAL MEDICINE CLINIC | Age: 81
End: 2021-03-12
Payer: MEDICARE

## 2021-03-12 VITALS
OXYGEN SATURATION: 99 % | HEIGHT: 61 IN | HEART RATE: 72 BPM | DIASTOLIC BLOOD PRESSURE: 80 MMHG | TEMPERATURE: 98.6 F | BODY MASS INDEX: 47.39 KG/M2 | WEIGHT: 251 LBS | SYSTOLIC BLOOD PRESSURE: 140 MMHG | RESPIRATION RATE: 20 BRPM

## 2021-03-12 DIAGNOSIS — J45.20 MILD INTERMITTENT ASTHMA WITHOUT COMPLICATION: ICD-10-CM

## 2021-03-12 DIAGNOSIS — D86.9 SARCOIDOSIS: ICD-10-CM

## 2021-03-12 DIAGNOSIS — E66.01 MORBID OBESITY (HCC): ICD-10-CM

## 2021-03-12 DIAGNOSIS — J01.90 ACUTE NON-RECURRENT SINUSITIS, UNSPECIFIED LOCATION: ICD-10-CM

## 2021-03-12 DIAGNOSIS — M1A.9XX0 CHRONIC GOUT WITHOUT TOPHUS, UNSPECIFIED CAUSE, UNSPECIFIED SITE: ICD-10-CM

## 2021-03-12 DIAGNOSIS — K21.9 GASTROESOPHAGEAL REFLUX DISEASE WITHOUT ESOPHAGITIS: ICD-10-CM

## 2021-03-12 DIAGNOSIS — J30.9 ALLERGIC RHINITIS, UNSPECIFIED SEASONALITY, UNSPECIFIED TRIGGER: ICD-10-CM

## 2021-03-12 DIAGNOSIS — I10 ESSENTIAL HYPERTENSION: ICD-10-CM

## 2021-03-12 DIAGNOSIS — M15.9 OSTEOARTHRITIS OF MULTIPLE JOINTS, UNSPECIFIED OSTEOARTHRITIS TYPE: ICD-10-CM

## 2021-03-12 DIAGNOSIS — R73.03 PREDIABETES: ICD-10-CM

## 2021-03-12 PROCEDURE — G8753 SYS BP > OR = 140: HCPCS | Performed by: INTERNAL MEDICINE

## 2021-03-12 PROCEDURE — G8400 PT W/DXA NO RESULTS DOC: HCPCS | Performed by: INTERNAL MEDICINE

## 2021-03-12 PROCEDURE — 99214 OFFICE O/P EST MOD 30 MIN: CPT | Performed by: INTERNAL MEDICINE

## 2021-03-12 PROCEDURE — G8536 NO DOC ELDER MAL SCRN: HCPCS | Performed by: INTERNAL MEDICINE

## 2021-03-12 PROCEDURE — G8754 DIAS BP LESS 90: HCPCS | Performed by: INTERNAL MEDICINE

## 2021-03-12 PROCEDURE — G8417 CALC BMI ABV UP PARAM F/U: HCPCS | Performed by: INTERNAL MEDICINE

## 2021-03-12 PROCEDURE — G8510 SCR DEP NEG, NO PLAN REQD: HCPCS | Performed by: INTERNAL MEDICINE

## 2021-03-12 PROCEDURE — 1101F PT FALLS ASSESS-DOCD LE1/YR: CPT | Performed by: INTERNAL MEDICINE

## 2021-03-12 PROCEDURE — G8427 DOCREV CUR MEDS BY ELIG CLIN: HCPCS | Performed by: INTERNAL MEDICINE

## 2021-03-12 PROCEDURE — 1090F PRES/ABSN URINE INCON ASSESS: CPT | Performed by: INTERNAL MEDICINE

## 2021-03-12 RX ORDER — CETIRIZINE HCL 10 MG
10 TABLET ORAL DAILY
Qty: 90 TAB | Refills: 1 | Status: SHIPPED | OUTPATIENT
Start: 2021-03-12 | End: 2021-09-15

## 2021-03-12 RX ORDER — AMOXICILLIN 500 MG/1
500 CAPSULE ORAL 3 TIMES DAILY
Qty: 21 CAP | Refills: 0 | Status: SHIPPED | OUTPATIENT
Start: 2021-03-12 | End: 2021-04-23 | Stop reason: ALTCHOICE

## 2021-03-12 NOTE — PROGRESS NOTES
Chief Complaint   Patient presents with    Follow Up Chronic Condition     htn,gerd,asthma     Visit Vitals  BP (!) 152/80 (BP 1 Location: Left upper arm, BP Patient Position: Sitting)   Pulse 75   Temp 98.6 °F (37 °C)   Resp 20   Ht 5' 1\" (1.549 m)   Wt 251 lb (113.9 kg)   SpO2 99%   BMI 47.43 kg/m²     1. Have you been to the ER, urgent care clinic since your last visit? Hospitalized since your last visit? NO    2. Have you seen or consulted any other health care providers outside of the [address] [place] [address] since your last visit? Include any pap smears or colon screening.  NO

## 2021-03-12 NOTE — PROGRESS NOTES
Bartolome Farmer is a [de-identified] y.o. female and presents with Follow Up Chronic Condition (htn,gerd,asthma)      Ms. Peggy Levin came for regular follow-up. She has done her labs. I reviewed and discussed with her. She just recently reestablished with me,. She is on multiple medications. She is complaining of  yellow sputum and thick sputum also having  postnasal drainage. She told me because it is yellow she needs antibiotic no fever or chills. She does not take any cetirizine. She does take fluticasone nasal spray. Started on cetirizine. She told me sometimes she takes OTC  Zyrtec. Her blood pressure is slightly elevated but she has not taken her medicines. She is getting Lamisil terbinafine for having onychomycosis,, of left great toenail. Side effects discussed in length. Her liver enzymes are normal.  And I recommended that she should continue 12 weeks course that has been provided by podiatrist.  She has no flareup of sarcoid related shortness of breath. She has no chest pain. She is known to have DJD of joints. She also follows cardiologist.  No depression. She told me she has not taken any of her medicines. She has taken COVID-19 vaccine. Review of Systems    Review of Systems   Constitutional: Negative. HENT: Negative for nosebleeds, sinus pain and sore throat. Dry mouth and postnasal drainage thick and yellow in am.since 2 weeks. Eyes: Negative for blurred vision. Respiratory: Negative. Cardiovascular: Negative. Gastrointestinal: Negative. Genitourinary: Negative. Musculoskeletal: Negative. Neurological: Negative for dizziness, tingling, sensory change and headaches. Psychiatric/Behavioral: Negative for depression, memory loss and substance abuse. The patient does not have insomnia.          Past Medical History:   Diagnosis Date    Allergic rhinitis     Angina pectoris (HCC)     Arthritis     Cardiac murmur     Chronic ischemic heart disease     Diabetes ([address]   • Edema of left lower leg    • GERD (gastroesophageal reflux disease)    • Hyperlipidemia    • Hypertension    • Knee pain    • Left tibialis posterior tendinitis    • Lumbar radiculopathy    • Morbid obesity (HCC)    • Osteoarthritis    • Sarcoidosis    • Shortness of breath    • Spondylosis      Past Surgical History:   Procedure Laterality Date   • HX CHOLECYSTECTOMY     • HX CYST REMOVAL     • IR CHOLECYSTOSTOMY PERCUTANEOUS       Social History     Socioeconomic History   • Marital status:      Spouse name: Not on file   • Number of children: Not on file   • Years of education: Not on file   • Highest education level: Not on file   Tobacco Use   • Smoking status: Never Smoker   • Smokeless tobacco: Never Used   Substance and Sexual Activity   • Alcohol use: Never     Frequency: Never   • Drug use: Never     Family History   Family history unknown: Yes     Current Outpatient Medications   Medication Sig Dispense Refill   • cetirizine (ZYRTEC) 10 mg tablet Take 1 Tab by mouth daily. 90 Tab 1   • amoxicillin (AMOXIL) 500 mg capsule Take 1 Cap by mouth three (3) times daily. 21 Cap 0   • omeprazole (PRILOSEC) 20 mg capsule Take 1 Cap by mouth daily. 90 Cap 2   • terbinafine HCL (LAMISIL) 250 mg tablet Take 1 Tab by mouth daily for 90 days. 90 Tab 0   • losartan-hydroCHLOROthiazide (HYZAAR) 100-25 mg per tablet TAKE 1 TABLET BY MOUTH ONCE DAILY FOR 90 DAYS     • diclofenac (VOLTAREN) 1 % gel Apply 4 g to affected area four (4) times daily. 450 g 2   • acetaminophen (TylenoL) 325 mg tablet Take 650 mg by mouth every four (4) hours as needed for Pain.     • albuterol (ACCUNEB) 0.63 mg/3 mL nebulizer solution 0.63 mg by Nebulization route every six (6) hours as needed for Wheezing.     • albuterol (ProAir HFA) 90 mcg/actuation inhaler Take 2 Puffs by inhalation every four (4) hours as needed for Wheezing.     • allopurinoL (ZYLOPRIM) 300 mg tablet Take 300 mg by mouth daily.     • ammonium lactate (LAC-HYDRIN)  12 % lotion Apply  to affected area as needed. rub in to affected area well      aspirin 81 mg chewable tablet Take 81 mg by mouth daily.  budesonide-formoteroL (Symbicort) 80-4.5 mcg/actuation HFAA Take 2 Puffs by inhalation two (2) times a day.  calcium carbonate (TUMS) 200 mg calcium (500 mg) chew Take 1 Tab by mouth daily.  carvediloL (Coreg) 12.5 mg tablet Take  by mouth two (2) times daily (with meals).  fluticasone propionate (Flonase Allergy Relief) 50 mcg/actuation nasal spray 2 Sprays by Both Nostrils route daily.  gabapentin (NEURONTIN) 100 mg capsule Take  by mouth three (3) times daily.  isosorbide mononitrate ER (IMDUR) 30 mg tablet Take 30 mg by mouth daily.  multivitamin (ONE A DAY) tablet Take 1 Tab by mouth daily.  nitroglycerin (Nitrostat) 0.3 mg SL tablet 0.3 mg by SubLINGual route every five (5) minutes as needed for Chest Pain.  potassium chloride SA (MICRO-K) 10 mEq capsule Take 10 mEq by mouth daily.  pravastatin (PRAVACHOL) 40 mg tablet Take 40 mg by mouth daily.  psyllium (METAMUCIL) powd Take 525 mg by mouth daily. Allergies   Allergen Reactions    Clindamycin Seizures       Objective:  Visit Vitals  BP (!) 140/80 (BP 1 Location: Right upper arm, BP Patient Position: Sitting, BP Cuff Size: Large adult)   Pulse 72   Temp 98.6 °F (37 °C)   Resp 20   Ht 5' 1\" (1.549 m)   Wt 251 lb (113.9 kg)   SpO2 99%   BMI 47.43 kg/m²       Physical Exam:   Constitutional: General Appearance: Pleasant. Level of Distress: NAD. Psychiatric: Mental Status: normal mood and affect Orientation: to time, place, and person. ,normal eye contact. Head: Head: normocephalic and atraumatic. Eyes: Pupils: PERRLA. Sclerae: non-icteric. Neck: Neck: supple, trachea midline, and no masses. Lymph Nodes: no cervical LAD. Thyroid: no enlargement or nodules and non-tender. Lungs: Respiratory effort: no dyspnea.  Auscultation: no wheezing, rales/crackles, or rhonchi and breath sounds normal and good air movement. Cardiovascular: Apical Impulse: not displaced. Heart Auscultation: normal S1 and S2; no murmurs, rubs, or gallops; and RRR. Neck vessels: no carotid bruits. Pulses including femoral / pedal: normal throughout. Abdomen: Bowel Sounds: normal. Inspection and Palpation: no tenderness, guarding, or masses and soft and non-distended. Liver: non-tender and no hepatomegaly. Spleen: non-tender and no splenomegaly. Musculoskeletal[de-identified] Extremities: no edema,no varicosities. No Calf tenderness. Neurologic: Gait and Station: normal gait and station. Motor Strength normal right and left. Skin: Inspection and palpation: no rash, lesions, or ulcer. Results for orders placed or performed in visit on 01/06/21   CBC WITH AUTOMATED DIFF   Result Value Ref Range    WBC 10.1 3.4 - 10.8 x10E3/uL    RBC 5.66 (H) 3.77 - 5.28 x10E6/uL    HGB 14.0 11.1 - 15.9 g/dL    HCT 43.8 34.0 - 46.6 %    MCV 77 (L) 79 - 97 fL    MCH 24.7 (L) 26.6 - 33.0 pg    MCHC 32.0 31.5 - 35.7 g/dL    RDW 15.6 (H) 11.7 - 15.4 %    PLATELET 788 164 - 056 x10E3/uL    NEUTROPHILS 63 Not Estab. %    Lymphocytes 24 Not Estab. %    MONOCYTES 8 Not Estab. %    EOSINOPHILS 4 Not Estab. %    BASOPHILS 1 Not Estab. %    ABS. NEUTROPHILS 6.5 1.4 - 7.0 x10E3/uL    Abs Lymphocytes 2.4 0.7 - 3.1 x10E3/uL    ABS. MONOCYTES 0.8 0.1 - 0.9 x10E3/uL    ABS. EOSINOPHILS 0.4 0.0 - 0.4 x10E3/uL    ABS. BASOPHILS 0.1 0.0 - 0.2 x10E3/uL    IMMATURE GRANULOCYTES 0 Not Estab. %    ABS. IMM.  GRANS. 0.0 0.0 - 0.1 R39D5/IY   METABOLIC PANEL, COMPREHENSIVE   Result Value Ref Range    Glucose 97 65 - 99 mg/dL    BUN 21 8 - 27 mg/dL    Creatinine 1.19 (H) 0.57 - 1.00 mg/dL    GFR est non-AA 43 (L) >59 mL/min/1.73    GFR est AA 50 (L) >59 mL/min/1.73    BUN/Creatinine ratio 18 12 - 28    Sodium 141 134 - 144 mmol/L    Potassium 3.8 3.5 - 5.2 mmol/L    Chloride 95 (L) 96 - 106 mmol/L    CO2 31 (H) 20 - 29 mmol/L    Calcium 9.9 8.7 - 10.3 mg/dL    Protein, total 7.5 6.0 - 8.5 g/dL    Albumin 4.4 3.7 - 4.7 g/dL    GLOBULIN, TOTAL 3.1 1.5 - 4.5 g/dL    A-G Ratio 1.4 1.2 - 2.2    Bilirubin, total 0.4 0.0 - 1.2 mg/dL    Alk. phosphatase 54 39 - 117 IU/L    AST (SGOT) 19 0 - 40 IU/L    ALT (SGPT) 15 0 - 32 IU/L   LIPID PANEL   Result Value Ref Range    Cholesterol, total 173 100 - 199 mg/dL    Triglyceride 97 0 - 149 mg/dL    HDL Cholesterol 63 >39 mg/dL    VLDL, calculated 18 5 - 40 mg/dL    LDL, calculated 92 0 - 99 mg/dL   HEMOGLOBIN A1C WITH EAG   Result Value Ref Range    Hemoglobin A1c 6.5 (H) 4.8 - 5.6 %    Estimated average glucose 140 mg/dL   URINALYSIS W/ RFLX MICROSCOPIC   Result Value Ref Range    Specific Gravity 1.020 1.005 - 1.030    pH (UA) 6.5 5.0 - 7.5    Color Yellow Yellow    Appearance Clear Clear    Leukocyte Esterase Trace (A) Negative    Protein Negative Negative/Trace    Glucose Negative Negative    Ketone Negative Negative    Blood Trace (A) Negative    Bilirubin Negative Negative    Urobilinogen 0.2 0.2 - 1.0 mg/dL    Nitrites Positive (A) Negative    Microscopic Examination See additional order    TSH 3RD GENERATION   Result Value Ref Range    TSH 1.630 0.450 - 4.500 uIU/mL   VITAMIN D, 25 HYDROXY   Result Value Ref Range    VITAMIN D, 25-HYDROXY 34.8 30.0 - 100.0 ng/mL   URIC ACID   Result Value Ref Range    Uric acid 4.9 3.1 - 7.9 mg/dL   MICROSCOPIC EXAMINATION   Result Value Ref Range    WBC 0-5 0 - 5 /hpf    RBC 11-30 (A) 0 - 2 /hpf    Epithelial cells 0-10 0 - 10 /hpf    Casts None seen None seen /lpf    Mucus Present Not Estab. Bacteria Few None seen/Few       Assessment/Plan:      ICD-10-CM ICD-9-CM    1. Essential hypertension  I10 401.9    2. Mild intermittent asthma without complication  Y83.96 859.72    3. Chronic gout without tophus, unspecified cause, unspecified site  M1A. 9XX0 274.02    4. Gastroesophageal reflux disease without esophagitis  K21.9 530.81    5. Morbid obesity ([facility] [address]  E66.01 278.01    6. Sarcoidosis  D86.9 135    7. Allergic rhinitis, unspecified seasonality, unspecified trigger  J30.9 477.9    8. Osteoarthritis of multiple joints, unspecified osteoarthritis type  M15.9 715.89    9. Prediabetes  R73.03 790.29    10. Acute non-recurrent sinusitis, unspecified location  J01.90 461.9      Orders Placed This Encounter   • cetirizine (ZYRTEC) 10 mg tablet     Sig: Take 1 Tab by mouth daily.     Dispense:  90 Tab     Refill:  1   • amoxicillin (AMOXIL) 500 mg capsule     Sig: Take 1 Cap by mouth three (3) times daily.     Dispense:  21 Cap     Refill:  0       Hypertension  slightly on upper side of normal range but she has not taken her medications.  Continue carvedilol 12.5 mg twice a day, losartan hydrochlorothiazide 100/25 mg/day.  Diet low in sodium.    Acute sinusitis and with postnasal drainage with yellow sputum for last 2 weeks and causing dry mouth started on cetirizine 10 mg once a day continued on fluticasone nasal spray and if she has dry heat she can have a humidifier in her room,.  She is not allergic to amoxicillin.  Started on amoxicillin.  She can take OTC probiotics and yogurt.    Hypercholesteremia continued on pravastatin 40 mg at bedtime.  lipid profile is controlled.  Diet low in fat.    GERD taking omeprazole 20 mg once a day.    CAD follows cardiologist once a year taking isosorbide mononitrate ER and continue heart healthy diet.  Continue low-dose aspirin and statin.    DJD of joints using diclofenac gel and taking gabapentin probably due to history of sciatica.    Onychomycosis of left great toenail follows podiatrist and has been started on oral terbinafine it seems like her biopsy of the nail is positive, her liver enzymes were normal she is recommended to continue for 90 days, she wanted to know the side effects I explained that if she has been positive for onychomycosis with positive nail biopsy she should get it but with frequent liver enzymes monitoring and blood  work.    Morbid obesity lifestyle modification. History of sarcoidosis with sometimes asthma currently asymptomatic taking albuterol inhaler and Symbicort. Continue multivitamin D. Lab results explained to her. She is up to date for COVID-19 vaccine. Follow-up in 3 months. Refills given. lose weight, continue present plan, call if any problems    There are no Patient Instructions on file for this visit. Follow-up and Dispositions    · Return in about 3 months (around 6/12/2021) for htn ,and others ,multiple .

## 2021-04-14 ENCOUNTER — OFFICE VISIT (OUTPATIENT)
Dept: PODIATRY | Age: 81
End: 2021-04-14
Payer: MEDICARE

## 2021-04-14 VITALS
DIASTOLIC BLOOD PRESSURE: 67 MMHG | TEMPERATURE: 96 F | SYSTOLIC BLOOD PRESSURE: 152 MMHG | HEIGHT: 61 IN | OXYGEN SATURATION: 98 % | WEIGHT: 252.2 LBS | BODY MASS INDEX: 47.62 KG/M2 | HEART RATE: 66 BPM

## 2021-04-14 DIAGNOSIS — B35.1 ONYCHOMYCOSIS: Primary | ICD-10-CM

## 2021-04-14 DIAGNOSIS — M15.9 OSTEOARTHRITIS OF MULTIPLE JOINTS, UNSPECIFIED OSTEOARTHRITIS TYPE: ICD-10-CM

## 2021-04-14 PROCEDURE — 1101F PT FALLS ASSESS-DOCD LE1/YR: CPT | Performed by: PODIATRIST

## 2021-04-14 PROCEDURE — G8400 PT W/DXA NO RESULTS DOC: HCPCS | Performed by: PODIATRIST

## 2021-04-14 PROCEDURE — G8536 NO DOC ELDER MAL SCRN: HCPCS | Performed by: PODIATRIST

## 2021-04-14 PROCEDURE — G8753 SYS BP > OR = 140: HCPCS | Performed by: PODIATRIST

## 2021-04-14 PROCEDURE — G8427 DOCREV CUR MEDS BY ELIG CLIN: HCPCS | Performed by: PODIATRIST

## 2021-04-14 PROCEDURE — G8754 DIAS BP LESS 90: HCPCS | Performed by: PODIATRIST

## 2021-04-14 PROCEDURE — 99213 OFFICE O/P EST LOW 20 MIN: CPT | Performed by: PODIATRIST

## 2021-04-14 PROCEDURE — G8432 DEP SCR NOT DOC, RNG: HCPCS | Performed by: PODIATRIST

## 2021-04-14 PROCEDURE — 1090F PRES/ABSN URINE INCON ASSESS: CPT | Performed by: PODIATRIST

## 2021-04-14 PROCEDURE — G8417 CALC BMI ABV UP PARAM F/U: HCPCS | Performed by: PODIATRIST

## 2021-04-14 RX ORDER — DICLOFENAC SODIUM 10 MG/G
4 GEL TOPICAL 4 TIMES DAILY
Qty: 100 G | Refills: 2 | Status: SHIPPED | OUTPATIENT
Start: 2021-04-14 | End: 2021-09-15 | Stop reason: ALTCHOICE

## 2021-04-14 NOTE — PROGRESS NOTES
[facility]    Subjective:         Patient is a [de-identified] y.o. female who is being seen as a returning pt for left ankle pain and thick/discolored toenails. Patient states she is gone for x-rays and would like to review the findings. She also states she would like to review her nail plate biopsy results. She states the diclofenac that was prescribed during her previous office visit has helped. She denies any recent trauma. She states her pain still rises to the level of 3 out of 10. She states the pain is sharp in nature and shoots up the lateral side of her leg. She denies any systemic signs of infection. She denies any other pedal complaints    Past Medical History:   Diagnosis Date    Allergic rhinitis     Angina pectoris (HCC)     Arthritis     Cardiac murmur     Chronic ischemic heart disease     Diabetes ([address]     Edema of left lower leg     GERD (gastroesophageal reflux disease)     Hyperlipidemia     Hypertension     Knee pain     Left tibialis posterior tendinitis     Lumbar radiculopathy     Morbid obesity (HCC)     Osteoarthritis     Sarcoidosis     Shortness of breath     Spondylosis      Past Surgical History:   Procedure Laterality Date    HX CHOLECYSTECTOMY      HX CYST REMOVAL      IR CHOLECYSTOSTOMY PERCUTANEOUS         Family History   Family history unknown: Yes      Social History     Tobacco Use    Smoking status: Never Smoker    Smokeless tobacco: Never Used   Substance Use Topics    Alcohol use: Never     Frequency: Never     Allergies   Allergen Reactions    Clindamycin Seizures     Prior to Admission medications    Medication Sig Start Date End Date Taking? Authorizing Provider   cetirizine (ZYRTEC) 10 mg tablet Take 1 Tab by mouth daily.  3/12/21  Yes Ricardo Tovar MD   losartan-hydroCHLOROthiazide (HYZAAR) 100-25 mg per tablet TAKE 1 TABLET BY MOUTH ONCE DAILY FOR 90 DAYS 10/10/20  Yes Provider, Historical   diclofenac (VOLTAREN) 1 % gel Apply 4 g to affected area four (4) times daily. 1/6/21  Yes Alfonso Bearden DPM   acetaminophen (TylenoL) 325 mg tablet Take 650 mg by mouth every four (4) hours as needed for Pain. Yes Navarro, MD Neda   albuterol (ACCUNEB) 0.63 mg/3 mL nebulizer solution 0.63 mg by Nebulization route every six (6) hours as needed for Wheezing. Yes Navarro, MD Neda   albuterol (ProAir HFA) 90 mcg/actuation inhaler Take 2 Puffs by inhalation every four (4) hours as needed for Wheezing. Yes Navarro, MD Neda   allopurinoL (ZYLOPRIM) 300 mg tablet Take 300 mg by mouth daily. Yes Navarro, MD Neda   ammonium lactate (LAC-HYDRIN) 12 % lotion Apply  to affected area as needed. rub in to affected area well   Yes Navarro, MD Neda   aspirin 81 mg chewable tablet Take 81 mg by mouth daily. Yes Navarro, MD Neda   budesonide-formoteroL (Symbicort) 80-4.5 mcg/actuation HFAA Take 2 Puffs by inhalation two (2) times a day. Yes Navarro, MD Neda   calcium carbonate (TUMS) 200 mg calcium (500 mg) chew Take 1 Tab by mouth daily. Yes Navarro, MD Neda   carvediloL (Coreg) 12.5 mg tablet Take  by mouth two (2) times daily (with meals). Yes Navarro, MD Neda   fluticasone propionate (Flonase Allergy Relief) 50 mcg/actuation nasal spray 2 Sprays by Both Nostrils route daily. Yes Navarro, MD Neda   gabapentin (NEURONTIN) 100 mg capsule Take  by mouth three (3) times daily. Yes Other, MD Neda   isosorbide mononitrate ER (IMDUR) 30 mg tablet Take 30 mg by mouth daily. Yes Navarro, MD Neda   multivitamin (ONE A DAY) tablet Take 1 Tab by mouth daily. Yes Navarro, MD Neda   nitroglycerin (Nitrostat) 0.3 mg SL tablet 0.3 mg by SubLINGual route every five (5) minutes as needed for Chest Pain. Yes Other, MD Neda   potassium chloride SA (MICRO-K) 10 mEq capsule Take 10 mEq by mouth daily. Yes Navarro, MD Neda   pravastatin (PRAVACHOL) 40 mg tablet Take 40 mg by mouth daily.    Yes Other, MD Neda   psyllium (METAMUCIL) powd Take 525 mg by mouth daily. Yes Other, MD Neda   amoxicillin (AMOXIL) 500 mg capsule Take 1 Cap by mouth three (3) times daily. 3/12/21   Ck Kc MD   omeprazole (PRILOSEC) 20 mg capsule Take 1 Cap by mouth daily. 1/14/21   Ck Kc MD   terbinafine HCL (LAMISIL) 250 mg tablet Take 1 Tab by mouth daily for 90 days. 1/13/21 4/13/21  Kath Bearden, CAROLINE       Review of Systems   Constitutional: Negative. HENT: Negative. Eyes: Negative. Respiratory: Negative. Cardiovascular: Negative. Gastrointestinal: Negative. Endocrine: Negative. Genitourinary: Negative. Musculoskeletal: Positive for arthralgias. Skin: Negative. Allergic/Immunologic: Positive for environmental allergies. Neurological: Positive for numbness. Hematological: Negative. Psychiatric/Behavioral: Negative. All other systems reviewed and are negative. Objective:     Visit Vitals  BP (!) 152/67 (BP 1 Location: Left upper arm, BP Patient Position: Sitting, BP Cuff Size: Large adult)   Pulse 66   Temp (!) 96 °F (35.6 °C) (Temporal)   Ht 5' 1\" (1.549 m)   Wt 252 lb 3.2 oz (114.4 kg)   SpO2 98%   BMI 47.65 kg/m²       Physical Exam  Vitals signs reviewed. Constitutional:       Appearance: She is morbidly obese. Cardiovascular:      Pulses:           Dorsalis pedis pulses are 2+ on the right side and 2+ on the left side. Posterior tibial pulses are 2+ on the right side and 2+ on the left side. Pulmonary:      Effort: Pulmonary effort is normal.   Musculoskeletal:      Right ankle: Normal.      Left ankle: She exhibits decreased range of motion and swelling. Tenderness. Right lower leg: No edema. Left lower leg: Edema present. Right foot: Normal range of motion. No deformity or bunion. Left foot: Normal range of motion. No deformity or bunion. Feet:      Right foot:      Protective Sensation: 10 sites tested. 10 sites sensed.       Skin integrity: Skin integrity normal.      Toenail Condition: Right toenails are abnormally thick. Fungal disease present. Left foot:      Protective Sensation: 10 sites tested. 10 sites sensed. Skin integrity: Skin integrity normal.      Toenail Condition: Left toenails are abnormally thick. Fungal disease present. Lymphadenopathy:      Lower Body: No right inguinal adenopathy. No left inguinal adenopathy. Skin:     General: Skin is warm. Capillary Refill: Capillary refill takes 2 to 3 seconds. Neurological:      Mental Status: She is alert and oriented to person, place, and time. Psychiatric:         Mood and Affect: Mood and affect normal.         Behavior: Behavior is cooperative. Data Review:   Study: XR ANKLE LT MIN 3 V     Clinical indication: Pain     Comparison: None.     Findings:     3 views.     No evidence of acute fracture or dislocation. Alignment is anatomic. Ankle  mortise is intact without evidence of widening. Posterior and plantar calcaneal  enthesophytes. No significant soft tissue swelling.        IMPRESSION  Impression:  No acute osseous abnormality. Impression:     Osteoarthritis  Onychomycosis    Recommendation:     Patient seen and evaluated in the office  Discussed and educated patient regarding her current medical condition  Personally reviewed x-ray images of the left ankle noting mild arthritic changes. No acute fracture dislocation noted. An in-depth conversation was had regarding treatment options and patient has elected to continue with conservative treatment. A refill prescription was given for the diclofenac gel to be utilized as needed  Personally reviewed her toenail plate biopsy results and discussed findings with patient, noting fungal elements present with in the biopsy sample. An in-depth conversation was had regarding treatment options and patient elected for topical therapy.   She is to continue with the ammonium lactate 12% topical lotion

## 2021-04-14 NOTE — PROGRESS NOTES
1. Have you been to the ER, urgent care clinic since your last visit? Hospitalized since your last visit? No    2. Have you seen or consulted any other health care providers outside of the [address] [place] [address] since your last visit? Include any pap smears or colon screening.  No     Chief Complaint   Patient presents with    Foot Exam     review x-ray results    Nail Problem     recheck fungus

## 2021-05-03 RX ORDER — LOSARTAN POTASSIUM AND HYDROCHLOROTHIAZIDE 25; 100 MG/1; MG/1
TABLET ORAL
Qty: 90 TAB | Refills: 1 | Status: SHIPPED | OUTPATIENT
Start: 2021-05-03 | End: 2021-10-28

## 2021-05-13 ENCOUNTER — HOSPITAL ENCOUNTER (OUTPATIENT)
Dept: GENERAL RADIOLOGY | Age: 81
Discharge: HOME | End: 2021-05-13
Payer: MEDICARE

## 2021-05-13 ENCOUNTER — TRANSCRIBE ORDER (OUTPATIENT)
Dept: REGISTRATION | Age: 81
End: 2021-05-13

## 2021-05-13 DIAGNOSIS — R06.02 SOB (SHORTNESS OF BREATH): Primary | ICD-10-CM

## 2021-05-13 DIAGNOSIS — R06.02 SOB (SHORTNESS OF BREATH): ICD-10-CM

## 2021-05-13 PROCEDURE — 71046 X-RAY EXAM CHEST 2 VIEWS: CPT

## 2021-05-14 ENCOUNTER — HOSPITAL ENCOUNTER (OUTPATIENT)
Dept: MAMMOGRAPHY | Age: 81
Discharge: HOME | End: 2021-05-14
Attending: INTERNAL MEDICINE
Payer: MEDICARE

## 2021-05-14 DIAGNOSIS — Z12.31 ENCOUNTER FOR SCREENING MAMMOGRAM FOR BREAST CANCER: ICD-10-CM

## 2021-05-14 PROCEDURE — 77063 BREAST TOMOSYNTHESIS BI: CPT

## 2021-05-19 RX ORDER — PRAVASTATIN SODIUM 40 MG/1
40 TABLET ORAL DAILY
Qty: 90 TABLET | Refills: 1 | Status: SHIPPED | OUTPATIENT
Start: 2021-05-19 | End: 2021-11-15 | Stop reason: SDUPTHER

## 2021-06-09 ENCOUNTER — TELEPHONE (OUTPATIENT)
Dept: INTERNAL MEDICINE CLINIC | Age: 81
End: 2021-06-09

## 2021-06-09 NOTE — TELEPHONE ENCOUNTER
Pt called stating that she has a verybad cough that is producing mucus that started Saturday. She has been taking OTC cough medication but it is not helping. She would like for you to send in a prescription to her pharmacy please.

## 2021-06-10 RX ORDER — AMOXICILLIN 500 MG/1
500 CAPSULE ORAL 3 TIMES DAILY
Qty: 21 CAPSULE | Refills: 0 | Status: SHIPPED | OUTPATIENT
Start: 2021-06-10 | End: 2021-09-15 | Stop reason: ALTCHOICE

## 2021-06-15 ENCOUNTER — OFFICE VISIT (OUTPATIENT)
Dept: INTERNAL MEDICINE CLINIC | Age: 81
End: 2021-06-15
Payer: MEDICARE

## 2021-06-15 VITALS
WEIGHT: 251 LBS | BODY MASS INDEX: 47.39 KG/M2 | HEART RATE: 60 BPM | HEIGHT: 61 IN | DIASTOLIC BLOOD PRESSURE: 74 MMHG | RESPIRATION RATE: 12 BRPM | SYSTOLIC BLOOD PRESSURE: 138 MMHG | OXYGEN SATURATION: 93 %

## 2021-06-15 DIAGNOSIS — J30.9 ALLERGIC RHINITIS, UNSPECIFIED SEASONALITY, UNSPECIFIED TRIGGER: ICD-10-CM

## 2021-06-15 DIAGNOSIS — E78.00 PURE HYPERCHOLESTEROLEMIA: ICD-10-CM

## 2021-06-15 DIAGNOSIS — M15.9 OSTEOARTHRITIS OF MULTIPLE JOINTS, UNSPECIFIED OSTEOARTHRITIS TYPE: ICD-10-CM

## 2021-06-15 DIAGNOSIS — I25.9 ISCHEMIC HEART DISEASE: ICD-10-CM

## 2021-06-15 DIAGNOSIS — M54.16 LUMBAR RADICULOPATHY: ICD-10-CM

## 2021-06-15 DIAGNOSIS — M1A.9XX0 CHRONIC GOUT WITHOUT TOPHUS, UNSPECIFIED CAUSE, UNSPECIFIED SITE: ICD-10-CM

## 2021-06-15 DIAGNOSIS — R05.9 COUGH: ICD-10-CM

## 2021-06-15 DIAGNOSIS — R73.03 PREDIABETES: ICD-10-CM

## 2021-06-15 DIAGNOSIS — J45.20 MILD INTERMITTENT ASTHMA WITHOUT COMPLICATION: ICD-10-CM

## 2021-06-15 DIAGNOSIS — I10 ESSENTIAL HYPERTENSION: ICD-10-CM

## 2021-06-15 DIAGNOSIS — K21.9 GASTROESOPHAGEAL REFLUX DISEASE WITHOUT ESOPHAGITIS: ICD-10-CM

## 2021-06-15 DIAGNOSIS — E66.01 MORBID OBESITY (HCC): ICD-10-CM

## 2021-06-15 PROCEDURE — G8427 DOCREV CUR MEDS BY ELIG CLIN: HCPCS | Performed by: INTERNAL MEDICINE

## 2021-06-15 PROCEDURE — 1101F PT FALLS ASSESS-DOCD LE1/YR: CPT | Performed by: INTERNAL MEDICINE

## 2021-06-15 PROCEDURE — G8752 SYS BP LESS 140: HCPCS | Performed by: INTERNAL MEDICINE

## 2021-06-15 PROCEDURE — 99214 OFFICE O/P EST MOD 30 MIN: CPT | Performed by: INTERNAL MEDICINE

## 2021-06-15 PROCEDURE — G8400 PT W/DXA NO RESULTS DOC: HCPCS | Performed by: INTERNAL MEDICINE

## 2021-06-15 PROCEDURE — 1090F PRES/ABSN URINE INCON ASSESS: CPT | Performed by: INTERNAL MEDICINE

## 2021-06-15 PROCEDURE — G8417 CALC BMI ABV UP PARAM F/U: HCPCS | Performed by: INTERNAL MEDICINE

## 2021-06-15 PROCEDURE — G8510 SCR DEP NEG, NO PLAN REQD: HCPCS | Performed by: INTERNAL MEDICINE

## 2021-06-15 PROCEDURE — G8754 DIAS BP LESS 90: HCPCS | Performed by: INTERNAL MEDICINE

## 2021-06-15 PROCEDURE — G8536 NO DOC ELDER MAL SCRN: HCPCS | Performed by: INTERNAL MEDICINE

## 2021-06-15 RX ORDER — ALBUTEROL SULFATE 90 UG/1
2 AEROSOL, METERED RESPIRATORY (INHALATION)
Qty: 1 INHALER | Refills: 3 | Status: SHIPPED | OUTPATIENT
Start: 2021-06-15

## 2021-06-15 NOTE — PROGRESS NOTES
Helga Weir is a [de-identified] y.o. female and presents with Follow Up Chronic Condition and Hypertension    Ms. Will Medina came for regular follow-up she told me she wet in the rain, couple of days ago and started having cough with white-yellow, sputum no fever or chills up to date for Covid vaccine I had sent prescription on 10th June for amoxicillin she is not allergic to amoxicillin, she told me she did not get it,, she does not take any OTC medicines her blood pressure is well controlled with current medication regimen she follows Dr. Salcido Ripa her cardiologist once a year she has, atherosclerotic heart disease managed conservatively,,  no chest pain no shortness of breath. She had history of sarcoidosis in the past taking multiple medications for her multiple chronic medical problems she does not have a rescue inhaler she does use Symbicort and nebulizer machine so rescue inhaler given,. No depression she is compliant for medicines,      Review of Systems    Review of Systems   Constitutional: Negative. HENT: Negative for congestion and sore throat. Cough with white yellow ,since last 1 week ,did not get amoxicillin from pharmacy that I sent on 10 th Ciera. Eyes: Negative for blurred vision. Respiratory: Negative for cough, shortness of breath and wheezing. Cardiovascular: Negative. Gastrointestinal: Negative. Genitourinary: Negative for dysuria, flank pain and hematuria. Musculoskeletal: Negative for joint pain and myalgias. Neurological: Negative for dizziness, tingling and sensory change. Endo/Heme/Allergies: Negative for polydipsia. Psychiatric/Behavioral: Negative for depression and memory loss. The patient is not nervous/anxious and does not have insomnia.          Past Medical History:   Diagnosis Date    Allergic rhinitis     Angina pectoris (HCC)     Arthritis     Cardiac murmur     Chronic ischemic heart disease     [address] [address]     Edema of left lower leg     GERD (gastroesophageal reflux disease)     Hyperlipidemia     Hypertension     Knee pain     Left tibialis posterior tendinitis     Lumbar radiculopathy     Morbid obesity (HCC)     Osteoarthritis     Sarcoidosis     Shortness of breath     Spondylosis      Past Surgical History:   Procedure Laterality Date    HX BREAST REDUCTION      30 yrs ago    HX CHOLECYSTECTOMY      HX CYST REMOVAL      IR CHOLECYSTOSTOMY PERCUTANEOUS       Social History     Socioeconomic History    Marital status:      Spouse name: Not on file    Number of children: Not on file    Years of education: Not on file    Highest education level: Not on file   Tobacco Use    Smoking status: Never Smoker    Smokeless tobacco: Never Used   Vaping Use    Vaping Use: Never used   Substance and Sexual Activity    Alcohol use: Never    Drug use: Never     Social Determinants of Health     Financial Resource Strain:     Difficulty of Paying Living Expenses:    Food Insecurity:     Worried About Running Out of Food in the Last Year:     Ran Out of Food in the Last Year:    Transportation Needs:     Lack of Transportation (Medical):  Lack of Transportation (Non-Medical):    Physical Activity:     Days of Exercise per Week:     Minutes of Exercise per Session:    Stress:     Feeling of Stress :    Social Connections:     Frequency of Communication with Friends and Family:     Frequency of Social Gatherings with Friends and Family:     Attends Sabianism Services:     Active Member of Clubs or Organizations:     Attends Club or Organization Meetings:     Marital Status:      Family History   Family history unknown: Yes     Current Outpatient Medications   Medication Sig Dispense Refill    albuterol (PROVENTIL HFA, VENTOLIN HFA, PROAIR HFA) 90 mcg/actuation inhaler Take 2 Puffs by inhalation every six (6) hours as needed for Wheezing. 1 Inhaler 3    pravastatin (PRAVACHOL) 40 mg tablet Take 1 Tablet by mouth daily.  80 Tablet 1    losartan-hydroCHLOROthiazide (HYZAAR) 100-25 mg per tablet TAKE 1 TABLET BY MOUTH ONCE DAILY FOR 90 DAYS 90 Tab 1    diclofenac (VOLTAREN) 1 % gel Apply 4 g to affected area four (4) times daily. 100 g 2    cetirizine (ZYRTEC) 10 mg tablet Take 1 Tab by mouth daily. 90 Tab 1    acetaminophen (TylenoL) 325 mg tablet Take 650 mg by mouth every four (4) hours as needed for Pain.  albuterol (ACCUNEB) 0.63 mg/3 mL nebulizer solution 0.63 mg by Nebulization route every six (6) hours as needed for Wheezing.  albuterol (ProAir HFA) 90 mcg/actuation inhaler Take 2 Puffs by inhalation every four (4) hours as needed for Wheezing.  allopurinoL (ZYLOPRIM) 300 mg tablet Take 300 mg by mouth daily.  ammonium lactate (LAC-HYDRIN) 12 % lotion Apply  to affected area as needed. rub in to affected area well      aspirin 81 mg chewable tablet Take 81 mg by mouth daily.  budesonide-formoteroL (Symbicort) 80-4.5 mcg/actuation HFAA Take 2 Puffs by inhalation two (2) times a day.  calcium carbonate (TUMS) 200 mg calcium (500 mg) chew Take 1 Tab by mouth daily.  carvediloL (Coreg) 12.5 mg tablet Take  by mouth two (2) times daily (with meals).  fluticasone propionate (Flonase Allergy Relief) 50 mcg/actuation nasal spray 2 Sprays by Both Nostrils route daily.  gabapentin (NEURONTIN) 100 mg capsule Take  by mouth three (3) times daily.  isosorbide mononitrate ER (IMDUR) 30 mg tablet Take 30 mg by mouth daily.  multivitamin (ONE A DAY) tablet Take 1 Tab by mouth daily.  nitroglycerin (Nitrostat) 0.3 mg SL tablet 0.3 mg by SubLINGual route every five (5) minutes as needed for Chest Pain.  potassium chloride SA (MICRO-K) 10 mEq capsule Take 10 mEq by mouth daily.  psyllium (METAMUCIL) powd Take 525 mg by mouth daily.  amoxicillin (AMOXIL) 500 mg capsule Take 1 Capsule by mouth three (3) times daily.  (Patient not taking: Reported on 6/15/2021) 21 Capsule 0 Allergies   Allergen Reactions    Clindamycin Seizures       Objective:  Visit Vitals  /74 (BP 1 Location: Left upper arm, BP Patient Position: Sitting, BP Cuff Size: Large adult)   Pulse 60   Resp 12   Ht 5' 1\" (1.549 m)   Wt 251 lb (113.9 kg)   SpO2 93%   BMI 47.43 kg/m²       Physical Exam:   Constitutional: General Appearance: Pleasant. Level of Distress: NAD. Psychiatric: Mental Status: normal mood and affect Orientation: to time, place, and person. ,normal eye contact. Head: Head: normocephalic and atraumatic. Eyes: Pupils: PERRLA. Sclerae: non-icteric. Neck: Neck: supple, trachea midline, and no masses. Lymph Nodes: no cervical LAD. Thyroid: no enlargement or nodules and non-tender. Lungs: Respiratory effort: no dyspnea. Auscultation: no wheezing, rales/crackles, or rhonchi and breath sounds normal and good air movement. Cardiovascular: Apical Impulse: not displaced. Heart Auscultation: normal S1 and S2; no murmurs, rubs, or gallops; and RRR. Neck vessels: no carotid bruits. Pulses including femoral / pedal: normal throughout. Abdomen: Bowel Sounds: normal. Inspection and Palpation: no tenderness, guarding, or masses and soft and non-distended. Liver: non-tender and no hepatomegaly. Spleen: non-tender and no splenomegaly. Musculoskeletal[de-identified] Extremities: no edema,no varicosities. No Calf tenderness. Neurologic: Gait and Station: normal gait and station. Motor Strength normal right and left. Skin: Inspection and palpation: no rash, lesions, or ulcer.        Results for orders placed or performed in visit on 01/06/21   CBC WITH AUTOMATED DIFF   Result Value Ref Range    WBC 10.1 3.4 - 10.8 x10E3/uL    RBC 5.66 (H) 3.77 - 5.28 x10E6/uL    HGB 14.0 11.1 - 15.9 g/dL    HCT 43.8 34.0 - 46.6 %    MCV 77 (L) 79 - 97 fL    MCH 24.7 (L) 26.6 - 33.0 pg    MCHC 32.0 31.5 - 35.7 g/dL    RDW 15.6 (H) 11.7 - 15.4 %    PLATELET 995 062 - 645 x10E3/uL    NEUTROPHILS 63 Not Estab. %    Lymphocytes 24 Not Estab. %    MONOCYTES 8 Not Estab. %    EOSINOPHILS 4 Not Estab. %    BASOPHILS 1 Not Estab. %    ABS. NEUTROPHILS 6.5 1.4 - 7.0 x10E3/uL    Abs Lymphocytes 2.4 0.7 - 3.1 x10E3/uL    ABS. MONOCYTES 0.8 0.1 - 0.9 x10E3/uL    ABS. EOSINOPHILS 0.4 0.0 - 0.4 x10E3/uL    ABS. BASOPHILS 0.1 0.0 - 0.2 x10E3/uL    IMMATURE GRANULOCYTES 0 Not Estab. %    ABS. IMM. GRANS. 0.0 0.0 - 0.1 N90Y1/HM   METABOLIC PANEL, COMPREHENSIVE   Result Value Ref Range    Glucose 97 65 - 99 mg/dL    BUN 21 8 - 27 mg/dL    Creatinine 1.19 (H) 0.57 - 1.00 mg/dL    GFR est non-AA 43 (L) >59 mL/min/1.73    GFR est AA 50 (L) >59 mL/min/1.73    BUN/Creatinine ratio 18 12 - 28    Sodium 141 134 - 144 mmol/L    Potassium 3.8 3.5 - 5.2 mmol/L    Chloride 95 (L) 96 - 106 mmol/L    CO2 31 (H) 20 - 29 mmol/L    Calcium 9.9 8.7 - 10.3 mg/dL    Protein, total 7.5 6.0 - 8.5 g/dL    Albumin 4.4 3.7 - 4.7 g/dL    GLOBULIN, TOTAL 3.1 1.5 - 4.5 g/dL    A-G Ratio 1.4 1.2 - 2.2    Bilirubin, total 0.4 0.0 - 1.2 mg/dL    Alk.  phosphatase 54 39 - 117 IU/L    AST (SGOT) 19 0 - 40 IU/L    ALT (SGPT) 15 0 - 32 IU/L   LIPID PANEL   Result Value Ref Range    Cholesterol, total 173 100 - 199 mg/dL    Triglyceride 97 0 - 149 mg/dL    HDL Cholesterol 63 >39 mg/dL    VLDL, calculated 18 5 - 40 mg/dL    LDL, calculated 92 0 - 99 mg/dL   HEMOGLOBIN A1C WITH EAG   Result Value Ref Range    Hemoglobin A1c 6.5 (H) 4.8 - 5.6 %    Estimated average glucose 140 mg/dL   URINALYSIS W/ RFLX MICROSCOPIC   Result Value Ref Range    Specific Gravity 1.020 1.005 - 1.030    pH (UA) 6.5 5.0 - 7.5    Color Yellow Yellow    Appearance Clear Clear    Leukocyte Esterase Trace (A) Negative    Protein Negative Negative/Trace    Glucose Negative Negative    Ketone Negative Negative    Blood Trace (A) Negative    Bilirubin Negative Negative    Urobilinogen 0.2 0.2 - 1.0 mg/dL    Nitrites Positive (A) Negative    Microscopic Examination See additional order    TSH 3RD GENERATION   Result Value Ref Range    TSH 1.630 0.450 - 4.500 uIU/mL   VITAMIN D, 25 HYDROXY   Result Value Ref Range    VITAMIN D, 25-HYDROXY 34.8 30.0 - 100.0 ng/mL   URIC ACID   Result Value Ref Range    Uric acid 4.9 3.1 - 7.9 mg/dL   MICROSCOPIC EXAMINATION   Result Value Ref Range    WBC 0-5 0 - 5 /hpf    RBC 11-30 (A) 0 - 2 /hpf    Epithelial cells 0-10 0 - 10 /hpf    Casts None seen None seen /lpf    Mucus Present Not Estab. Bacteria Few None seen/Few       Assessment/Plan:      ICD-10-CM ICD-9-CM    1. Essential hypertension  I10 401.9 CBC WITH AUTOMATED DIFF      METABOLIC PANEL, BASIC   2. Cough  R05 786.2 CBC WITH AUTOMATED DIFF   3. Pure hypercholesterolemia  E78.00 272.0    4. Chronic gout without tophus, unspecified cause, unspecified site  M1A. 9XX0 274.02    5. Mild intermittent asthma without complication  G61.56 746.28    6. Allergic rhinitis, unspecified seasonality, unspecified trigger  J30.9 477.9    7. Prediabetes  R73.03 790.29 HEMOGLOBIN A1C WITH EAG   8. Gastroesophageal reflux disease without esophagitis  K21.9 530.81    9. Lumbar radiculopathy  M54.16 724.4    10. Ischemic heart disease  I25.9 414.9    11. Osteoarthritis of multiple joints, unspecified osteoarthritis type  M15.9 715.89    12. Morbid obesity ([facility] [address]  E66.01 278.01      Orders Placed This Encounter    CBC WITH AUTOMATED DIFF    METABOLIC PANEL, BASIC    HEMOGLOBIN A1C WITH EAG    albuterol (PROVENTIL HFA, VENTOLIN HFA, PROAIR HFA) 90 mcg/actuation inhaler     Sig: Take 2 Puffs by inhalation every six (6) hours as needed for Wheezing. Dispense:  1 Inhaler     Refill:  3       Hypertension controlled continue carvedilol 12.5 mg twice a day, losartan hydrochlorothiazide 100/25 mg/day. Diet low in sodium.     Hypercholesteremia with history of questionable atherosclerotic heart disease follows Dr. Helga Faulkner once a year no history of stent placement at all in the past, no carotid artery stenosis, or coronary artery stenosis, she might have questionable CAD I am not sure. Continued on isosorbide mononitrate ER, 30 mg/day continue statin heart healthy diet continue carvedilol and losartan HCTZ, continue  aspirin 81 mg/day. Asthma with allergic rhinitis mild intermittent history of sarcoidosis, pulmonary sarcoidosis but it is stable continued on fluticasone nasal sprays at present Symbicort and nebulizer machine she does not have a rescue inhaler. Refill sent to the pharmacy. She needs to lose weight having BMI 47.43. She is non-smoker. Cough with white-yellow sputum, I had sent amoxicillin on 10th June prescription she did not refill from pharmacy, recommended to get OTC Robitussin cough syrup, or Mucinex. She has no fever chills up to date with COVID-19 vaccine. Labs ordered. History of lumbosacral radiculopathy taking gabapentin  Hypercholesteremia continue pravastatin 40 mg at bedtime. Gout continue allopurinol diet low in purine. GERD continue omeprazole    Morbid obesity lifestyle modification. Prediabetes with hemoglobin A1c 6.5% recommended to cut back on starch and sugar in the diet repeat hemoglobin A1c ordered. Labs ordered. lose weight, increase physical activity, routine labs ordered, call if any problems    There are no Patient Instructions on file for this visit. Follow-up and Dispositions    · Return in about 3 months (around 9/15/2021) for htn ,maria l and multiplle comorbidities and labs today.

## 2021-06-15 NOTE — PROGRESS NOTES
Chief Complaint   Patient presents with    Follow Up Chronic Condition    Hypertension     1. Have you been to the ER, urgent care clinic since your last visit? Hospitalized since your last visit? No    2. Have you seen or consulted any other health care providers outside of the [address] [place] [address] since your last visit? Include any pap smears or colon screening.  No    Visit Vitals  /68 (BP 1 Location: Right upper arm, BP Patient Position: Sitting, BP Cuff Size: Adult)   Pulse 65   Resp 12   Ht 5' 1\" (1.549 m)   Wt 251 lb (113.9 kg)   SpO2 93%   BMI 47.43 kg/m²

## 2021-06-26 LAB
BASOPHILS # BLD AUTO: 0.1 X10E3/UL (ref 0–0.2)
BASOPHILS NFR BLD AUTO: 1 %
BUN SERPL-MCNC: 15 MG/DL (ref 8–27)
BUN/CREAT SERPL: 14 (ref 12–28)
CALCIUM SERPL-MCNC: 9.3 MG/DL (ref 8.7–10.3)
CHLORIDE SERPL-SCNC: 101 MMOL/L (ref 96–106)
CO2 SERPL-SCNC: 32 MMOL/L (ref 20–29)
CREAT SERPL-MCNC: 1.06 MG/DL (ref 0.57–1)
EOSINOPHIL # BLD AUTO: 0.4 X10E3/UL (ref 0–0.4)
EOSINOPHIL NFR BLD AUTO: 5 %
ERYTHROCYTE [DISTWIDTH] IN BLOOD BY AUTOMATED COUNT: 16 % (ref 11.7–15.4)
EST. AVERAGE GLUCOSE BLD GHB EST-MCNC: 151 MG/DL
GLUCOSE SERPL-MCNC: 105 MG/DL (ref 65–99)
HBA1C MFR BLD: 6.9 % (ref 4.8–5.6)
HCT VFR BLD AUTO: 41.3 % (ref 34–46.6)
HGB BLD-MCNC: 13.2 G/DL (ref 11.1–15.9)
IMM GRANULOCYTES # BLD AUTO: 0 X10E3/UL (ref 0–0.1)
IMM GRANULOCYTES NFR BLD AUTO: 0 %
LYMPHOCYTES # BLD AUTO: 2.8 X10E3/UL (ref 0.7–3.1)
LYMPHOCYTES NFR BLD AUTO: 29 %
MCH RBC QN AUTO: 25.2 PG (ref 26.6–33)
MCHC RBC AUTO-ENTMCNC: 32 G/DL (ref 31.5–35.7)
MCV RBC AUTO: 79 FL (ref 79–97)
MONOCYTES # BLD AUTO: 0.9 X10E3/UL (ref 0.1–0.9)
MONOCYTES NFR BLD AUTO: 10 %
NEUTROPHILS # BLD AUTO: 5.4 X10E3/UL (ref 1.4–7)
NEUTROPHILS NFR BLD AUTO: 55 %
PLATELET # BLD AUTO: 261 X10E3/UL (ref 150–450)
POTASSIUM SERPL-SCNC: 3.6 MMOL/L (ref 3.5–5.2)
RBC # BLD AUTO: 5.24 X10E6/UL (ref 3.77–5.28)
SODIUM SERPL-SCNC: 145 MMOL/L (ref 134–144)
WBC # BLD AUTO: 9.6 X10E3/UL (ref 3.4–10.8)

## 2021-06-28 RX ORDER — METFORMIN HYDROCHLORIDE 500 MG/1
500 TABLET ORAL
Qty: 90 TABLET | Refills: 0 | Status: SHIPPED | OUTPATIENT
Start: 2021-06-28 | End: 2021-09-12

## 2021-06-28 NOTE — PROGRESS NOTES
Please call Ms. Joelle Cody that she has become diabetic now, few months ago her hemoglobin A1c was 6.5 now it is 6.9, , she has to stop starch and sugar in the diet and she can use artificial  sweeteners instead of sugar , and stop bread and rice and white flour-containing, diet and stop sugar-containing pastries and desserts and ice creams I can start her on Metformin 500 mg once a day with meal, to prevent, further increase in the sugar her blood count is good,  her kidney function is slightly  lobe may be due to having hypertension and, other conditions her sodium is slightly up, she is also on fluid pill, that can also elevate make sure she does not get dehydrated, and do not take over-the-counter ibuprofen Aleve  excessively without my knowledge.   If she agrees for Metformin we have to give her prescription let me know

## 2021-07-09 ENCOUNTER — TELEPHONE (OUTPATIENT)
Dept: INTERNAL MEDICINE CLINIC | Age: 81
End: 2021-07-09

## 2021-07-09 RX ORDER — OMEPRAZOLE 40 MG/1
40 CAPSULE, DELAYED RELEASE ORAL DAILY
Qty: 90 CAPSULE | Refills: 2 | Status: SHIPPED | OUTPATIENT
Start: 2021-07-09 | End: 2022-02-21

## 2021-07-09 NOTE — TELEPHONE ENCOUNTER
Pt went to refill her omeprazole and was told by the pharmacy that it had been d/c'd by you. I see in your last office note that she was to continue it. Pt would like to know why it was d/c'd.

## 2021-09-12 PROBLEM — R73.03 PREDIABETES: Status: RESOLVED | Noted: 2021-01-06 | Resolved: 2021-09-12

## 2021-09-12 RX ORDER — METFORMIN HYDROCHLORIDE 500 MG/1
TABLET ORAL
Qty: 90 TABLET | Refills: 0 | Status: SHIPPED | OUTPATIENT
Start: 2021-09-12 | End: 2021-12-09

## 2021-09-15 ENCOUNTER — OFFICE VISIT (OUTPATIENT)
Dept: INTERNAL MEDICINE CLINIC | Age: 81
End: 2021-09-15
Payer: MEDICARE

## 2021-09-15 VITALS
DIASTOLIC BLOOD PRESSURE: 80 MMHG | BODY MASS INDEX: 46.82 KG/M2 | OXYGEN SATURATION: 96 % | RESPIRATION RATE: 12 BRPM | SYSTOLIC BLOOD PRESSURE: 122 MMHG | WEIGHT: 248 LBS | HEART RATE: 72 BPM | HEIGHT: 61 IN

## 2021-09-15 DIAGNOSIS — I87.2 VENOUS INSUFFICIENCY OF BOTH LOWER EXTREMITIES: ICD-10-CM

## 2021-09-15 DIAGNOSIS — K21.9 GASTROESOPHAGEAL REFLUX DISEASE WITHOUT ESOPHAGITIS: ICD-10-CM

## 2021-09-15 DIAGNOSIS — J45.20 MILD INTERMITTENT ASTHMA WITHOUT COMPLICATION: ICD-10-CM

## 2021-09-15 DIAGNOSIS — E66.01 MORBID OBESITY (HCC): ICD-10-CM

## 2021-09-15 DIAGNOSIS — M79.661 BILATERAL CALF PAIN: ICD-10-CM

## 2021-09-15 DIAGNOSIS — M1A.9XX0 CHRONIC GOUT WITHOUT TOPHUS, UNSPECIFIED CAUSE, UNSPECIFIED SITE: ICD-10-CM

## 2021-09-15 DIAGNOSIS — E11.9 CONTROLLED TYPE 2 DIABETES MELLITUS WITHOUT COMPLICATION, WITHOUT LONG-TERM CURRENT USE OF INSULIN (HCC): ICD-10-CM

## 2021-09-15 DIAGNOSIS — R60.9 PERIPHERAL EDEMA: ICD-10-CM

## 2021-09-15 DIAGNOSIS — I25.9 ISCHEMIC HEART DISEASE: ICD-10-CM

## 2021-09-15 DIAGNOSIS — E78.00 PURE HYPERCHOLESTEROLEMIA: ICD-10-CM

## 2021-09-15 DIAGNOSIS — J30.9 ALLERGIC RHINITIS, UNSPECIFIED SEASONALITY, UNSPECIFIED TRIGGER: ICD-10-CM

## 2021-09-15 DIAGNOSIS — M79.662 BILATERAL CALF PAIN: ICD-10-CM

## 2021-09-15 DIAGNOSIS — I10 ESSENTIAL HYPERTENSION: ICD-10-CM

## 2021-09-15 LAB — GLUCOSE POC: 132 MG/DL

## 2021-09-15 PROCEDURE — 82962 GLUCOSE BLOOD TEST: CPT | Performed by: INTERNAL MEDICINE

## 2021-09-15 PROCEDURE — G8510 SCR DEP NEG, NO PLAN REQD: HCPCS | Performed by: INTERNAL MEDICINE

## 2021-09-15 PROCEDURE — 1090F PRES/ABSN URINE INCON ASSESS: CPT | Performed by: INTERNAL MEDICINE

## 2021-09-15 PROCEDURE — G8417 CALC BMI ABV UP PARAM F/U: HCPCS | Performed by: INTERNAL MEDICINE

## 2021-09-15 PROCEDURE — 99214 OFFICE O/P EST MOD 30 MIN: CPT | Performed by: INTERNAL MEDICINE

## 2021-09-15 PROCEDURE — 1101F PT FALLS ASSESS-DOCD LE1/YR: CPT | Performed by: INTERNAL MEDICINE

## 2021-09-15 PROCEDURE — G8536 NO DOC ELDER MAL SCRN: HCPCS | Performed by: INTERNAL MEDICINE

## 2021-09-15 PROCEDURE — G8427 DOCREV CUR MEDS BY ELIG CLIN: HCPCS | Performed by: INTERNAL MEDICINE

## 2021-09-15 PROCEDURE — G8400 PT W/DXA NO RESULTS DOC: HCPCS | Performed by: INTERNAL MEDICINE

## 2021-09-15 PROCEDURE — G8752 SYS BP LESS 140: HCPCS | Performed by: INTERNAL MEDICINE

## 2021-09-15 PROCEDURE — G8754 DIAS BP LESS 90: HCPCS | Performed by: INTERNAL MEDICINE

## 2021-09-15 RX ORDER — DICLOFENAC SODIUM 10 MG/G
GEL TOPICAL 4 TIMES DAILY
Qty: 350 G | Refills: 1 | Status: SHIPPED | OUTPATIENT
Start: 2021-09-15 | End: 2022-04-07 | Stop reason: ALTCHOICE

## 2021-09-15 NOTE — PROGRESS NOTES
Chief Complaint   Patient presents with    Follow Up Chronic Condition    Hypertension    Diabetes     132 in office fasting     1. Have you been to the ER, urgent care clinic since your last visit? Hospitalized since your last visit? No    2. Have you seen or consulted any other health care providers outside of the [address] [place] [address] since your last visit? Include any pap smears or colon screening.  No    Visit Vitals  BP (!) 145/77 (BP 1 Location: Left upper arm, BP Patient Position: Sitting, BP Cuff Size: Adult)   Pulse 71   Resp 12   Ht 5' 1\" (1.549 m)   Wt 248 lb (112.5 kg)   SpO2 96%   BMI 46.86 kg/m²

## 2021-09-15 NOTE — PROGRESS NOTES
Magdiel Gutierrez is a 80 y.o. female and presents with Follow Up Chronic Condition, Hypertension, and Diabetes (132 in office fasting)    Ms. Keyanna Petty came for regular follow-up she has elevated hemoglobin A1c to 6.9%,She has cut back eating bread, and some dessert she has done about 3 to 4 pound weight loss by cutting back, portion of starch, she told me she is having swelling of ankle and feet sometimes she has pain in the calf, and she is concerned that she might have blood clot or vein related problems she is already taking HCTZ strongly, recommended not to be on another diuretic at her age of 80,  I have ordered venous Doppler she has osteoarthritis in knee joint. She might have Baker's cyst, her blood pressure is well controlled she is on multiple medications, she follows cardiologist Dr. [address] once a year having history of, ischemic heart disease in the past she is otherwise compliant for medicines no depression. Up to date for Covid vaccine. Review of Systems    Review of Systems   Constitutional: Negative. HENT: Negative for sinus pain and sore throat. Eyes: Negative for blurred vision. Respiratory: Negative for cough and wheezing. Cardiovascular: Negative. Having swelling which is nonpitting on both lower feet and ankle left more than right   Gastrointestinal: Negative. Genitourinary: Negative. Musculoskeletal: Positive for joint pain. Pain in the left calf below the left knee,   Neurological: Negative for dizziness, tingling and headaches. Psychiatric/Behavioral: Negative for depression and substance abuse. The patient is not nervous/anxious.          Past Medical History:   Diagnosis Date    Allergic rhinitis     Angina pectoris (HCC)     Arthritis     Cardiac murmur     Chronic ischemic heart disease     Diabetes ([address]     Edema of left lower leg     GERD (gastroesophageal reflux disease)     Hyperlipidemia     Hypertension     Knee pain     Left tibialis posterior tendinitis     Lumbar radiculopathy     Morbid obesity (HCC)     Osteoarthritis     Sarcoidosis     Shortness of breath     Spondylosis      Past Surgical History:   Procedure Laterality Date    HX BREAST REDUCTION      30 yrs ago    HX CHOLECYSTECTOMY      HX CYST REMOVAL      IR CHOLECYSTOSTOMY PERCUTANEOUS       Social History     Socioeconomic History    Marital status:      Spouse name: Not on file    Number of children: Not on file    Years of education: Not on file    Highest education level: Not on file   Tobacco Use    Smoking status: Never Smoker    Smokeless tobacco: Never Used   Vaping Use    Vaping Use: Never used   Substance and Sexual Activity    Alcohol use: Never    Drug use: Never     Social Determinants of Health     Financial Resource Strain:     Difficulty of Paying Living Expenses:    Food Insecurity:     Worried About Running Out of Food in the Last Year:     Ran Out of Food in the Last Year:    Transportation Needs:     Lack of Transportation (Medical):  Lack of Transportation (Non-Medical):    Physical Activity:     Days of Exercise per Week:     Minutes of Exercise per Session:    Stress:     Feeling of Stress :    Social Connections:     Frequency of Communication with Friends and Family:     Frequency of Social Gatherings with Friends and Family:     Attends [employer] Services:     Active Member of Clubs or Organizations:     Attends Club or Organization Meetings:     Marital Status:      Family History   Family history unknown: Yes     Current Outpatient Medications   Medication Sig Dispense Refill    diclofenac (VOLTAREN) 1 % gel Apply  to affected area four (4) times daily. Around and below lt knee joint. 350 g 1    metFORMIN (GLUCOPHAGE) 500 mg tablet Take 1 tablet by mouth once daily with breakfast 90 Tablet 0    omeprazole (PRILOSEC) 40 mg capsule Take 1 Capsule by mouth daily.  90 Capsule 2    albuterol (PROVENTIL HFA, VENTOLIN HFA, PROAIR HFA) 90 mcg/actuation inhaler Take 2 Puffs by inhalation every six (6) hours as needed for Wheezing. 1 Inhaler 3    pravastatin (PRAVACHOL) 40 mg tablet Take 1 Tablet by mouth daily. 90 Tablet 1    losartan-hydroCHLOROthiazide (HYZAAR) 100-25 mg per tablet TAKE 1 TABLET BY MOUTH ONCE DAILY FOR 90 DAYS 90 Tab 1    acetaminophen (TylenoL) 325 mg tablet Take 650 mg by mouth every four (4) hours as needed for Pain.  albuterol (ACCUNEB) 0.63 mg/3 mL nebulizer solution 0.63 mg by Nebulization route every six (6) hours as needed for Wheezing.  albuterol (ProAir HFA) 90 mcg/actuation inhaler Take 2 Puffs by inhalation every four (4) hours as needed for Wheezing.  allopurinoL (ZYLOPRIM) 300 mg tablet Take 300 mg by mouth daily.  ammonium lactate (LAC-HYDRIN) 12 % lotion Apply  to affected area as needed. rub in to affected area well      aspirin 81 mg chewable tablet Take 81 mg by mouth daily.  budesonide-formoteroL (Symbicort) 80-4.5 mcg/actuation HFAA Take 2 Puffs by inhalation two (2) times a day.  calcium carbonate (TUMS) 200 mg calcium (500 mg) chew Take 1 Tab by mouth daily.  carvediloL (Coreg) 12.5 mg tablet Take  by mouth two (2) times daily (with meals).  fluticasone propionate (Flonase Allergy Relief) 50 mcg/actuation nasal spray 2 Sprays by Both Nostrils route daily.  gabapentin (NEURONTIN) 100 mg capsule Take  by mouth three (3) times daily.  isosorbide mononitrate ER (IMDUR) 30 mg tablet Take 30 mg by mouth daily.  multivitamin (ONE A DAY) tablet Take 1 Tab by mouth daily.  nitroglycerin (Nitrostat) 0.3 mg SL tablet 0.3 mg by SubLINGual route every five (5) minutes as needed for Chest Pain.  potassium chloride SA (MICRO-K) 10 mEq capsule Take 10 mEq by mouth daily.  psyllium (METAMUCIL) powd Take 525 mg by mouth daily.  cetirizine (ZYRTEC) 10 mg tablet Take 1 Tab by mouth daily.  (Patient not taking: Reported on 9/15/2021) 90 Tab 1     Allergies   Allergen Reactions    Clindamycin Seizures       Objective:  Visit Vitals  /80 (BP 1 Location: Left upper arm, BP Patient Position: Sitting, BP Cuff Size: Large adult)   Pulse 72   Resp 12   Ht 5' 1\" (1.549 m)   Wt 248 lb (112.5 kg)   SpO2 96%   BMI 46.86 kg/m²       Physical Exam:   Constitutional: General Appearance: Pleasant. Level of Distress: NAD. Psychiatric: Mental Status: normal mood and affect Orientation: to time, place, and person. ,normal eye contact. Head: Head: normocephalic and atraumatic. Eyes: Pupils: PERRLA. Sclerae: non-icteric. Neck: Neck: supple, trachea midline, and no masses. Lymph Nodes: no cervical LAD. Thyroid: no enlargement or nodules and non-tender. Lungs: Respiratory effort: no dyspnea. Auscultation: no wheezing, rales/crackles, or rhonchi and breath sounds normal and good air movement. Cardiovascular: Apical Impulse: not displaced. Heart Auscultation: normal S1 and S2; no murmurs, rubs, or gallops; and RRR. Neck vessels: no carotid bruits. Pulses including femoral / pedal: normal throughout. Abdomen: Bowel Sounds: normal. Inspection and Palpation: no tenderness, guarding, or masses and soft and non-distended. Liver: non-tender and no hepatomegaly. Spleen: non-tender and no splenomegaly. Musculoskeletal[de-identified] Extremities: no edema,no varicosities. No Calf tenderness. Neurologic: Gait and Station: normal gait and station. Motor Strength normal right and left. Skin: Inspection and palpation: no rash, lesions, or ulcer. I could not appreciate  pitting swelling of ankle she has nonpitting swelling, and nontender mild,, bilateral, ankle,. Results for orders placed or performed in visit on 09/15/21   AMB POC GLUCOSE BLOOD, BY GLUCOSE MONITORING DEVICE   Result Value Ref Range    Glucose  MG/DL       Assessment/Plan:      ICD-10-CM ICD-9-CM    1. Essential hypertension  I10 401.9    2.  Controlled type 2 diabetes mellitus without complication, without long-term current use of insulin ([facility])  E11.9 250.00 AMB POC GLUCOSE BLOOD, BY GLUCOSE MONITORING DEVICE   3. Pure hypercholesterolemia  E78.00 272.0    4. Bilateral calf pain  M79.661 729.5 DUPLEX LOWER EXT VENOUS BILAT    M79.662     5. Peripheral edema  R60.9 782.3 DUPLEX LOWER EXT VENOUS BILAT   6. Venous insufficiency of both lower extremities  I87.2 459.81 DUPLEX LOWER EXT VENOUS BILAT   7. Chronic gout without tophus, unspecified cause, unspecified site  M1A. 9XX0 274.02    8. Mild intermittent asthma without complication  S30.55 502.90    9. Allergic rhinitis, unspecified seasonality, unspecified trigger  J30.9 477.9    10. Gastroesophageal reflux disease without esophagitis  K21.9 530.81    11. Ischemic heart disease  I25.9 414.9    12. Morbid obesity ([facility] [address]  E66.01 278.01      Orders Placed This Encounter    AMB POC GLUCOSE BLOOD, BY GLUCOSE MONITORING DEVICE    DUPLEX LOWER EXT VENOUS BILAT     calf pain and and swelling of both feet and r/o baker cyst and venous reflux and ?blood clot     Standing Status:   Future     Standing Expiration Date:   3/15/2022    diclofenac (VOLTAREN) 1 % gel     Sig: Apply  to affected area four (4) times daily. Around and below lt knee joint. Dispense:  350 g     Refill:  1       Hypertension controlled continued on losartan hydrochlorothiazide 100/25 mg once a day and carvedilol 12.5 mg twice a day. Diet low in sodium she is already taking hydrochlorothiazide 25 mg/day. Hypercholesteremia with history of ischemic heart disease she follows cardiologist Dr. David Nettles once a year in the interval time she had stopped seeing me, she takes isosorbide mononitrate ER 30 mg/day. She takes pravastatin and aspirin 81 mg/day.     Type 2 diabetes mellitus controlled with hemoglobin A1c 6.9% she has cut back eating bread and desserts in her diet she is taking Metformin 500 mg once a day with breakfast I will repeat labs in next 3 to 4 months continue diet less than 1800 ADA diet walking as much as she can do continue low-dose aspirin statin regular ophthalmologic checkup. History of DJD with history of sciatica taking gabapentin. Complaining of peripheral edema and pain in the calf muscle she might have venous insufficiency or osteoarthritis in knee joint with Baker's cyst she has multiple comorbidities we will try to rule out, DVT also she is almost sedentary, not walking much, recommended to keep legs propped up position already being on HCTZ 25 mg/day I will not add furosemide I have ordered bilateral venous Doppler, once the blood clot is ruled out then recommended to use compression stockings but not now she agreed. Morbid obesity with BMI  46.86, heart healthy diet. Continue potassium supplementation 10 mEq/day having history of hypokalemia in the past.    History of gout taking allopurinol. History of asthma taking albuterol she had history of sarcoid in the lung in the past.  No complaint of wheezing or shortness of breath.    , Follow-up in 3 months. Diabetic education given. Venous Doppler ordered. Is up to date for 2 doses of COVID-19 vaccine. lose weight, continue present plan, call if any problems she is complemented for losing 3 pound weight, since last 3 months. There are no Patient Instructions on file for this visit. Follow-up and Dispositions    · Return in about 3 months (around 12/15/2021) for follow up for chronic condition.

## 2021-09-16 RX ORDER — CETIRIZINE HCL 10 MG
10 TABLET ORAL DAILY
Qty: 90 TABLET | Refills: 1 | Status: SHIPPED | OUTPATIENT
Start: 2021-09-16 | End: 2022-03-03

## 2021-09-20 ENCOUNTER — HOSPITAL ENCOUNTER (OUTPATIENT)
Dept: NON INVASIVE DIAGNOSTICS | Age: 81
Discharge: HOME | End: 2021-09-20
Attending: INTERNAL MEDICINE
Payer: MEDICARE

## 2021-09-20 DIAGNOSIS — R60.9 PERIPHERAL EDEMA: ICD-10-CM

## 2021-09-20 DIAGNOSIS — M79.661 BILATERAL CALF PAIN: ICD-10-CM

## 2021-09-20 DIAGNOSIS — I87.2 VENOUS INSUFFICIENCY OF BOTH LOWER EXTREMITIES: ICD-10-CM

## 2021-09-20 DIAGNOSIS — M79.662 BILATERAL CALF PAIN: ICD-10-CM

## 2021-09-20 LAB
LEFT GSV BK DIST RFX: 6 S
LEFT GSV BK MID RFX: 3 S
RIGHT GSV BK DIST RFX: 6 S
RIGHT GSV BK MID RFX: 6 S

## 2021-09-20 PROCEDURE — 93970 EXTREMITY STUDY: CPT

## 2021-09-20 NOTE — PROGRESS NOTES
Please call MsPetar  Mercedes Bidding that she does not have blood clot she has to keep legs propped up position and can wear support hose she has osteoarthritis in knee joint,

## 2021-10-04 ENCOUNTER — OFFICE VISIT (OUTPATIENT)
Dept: PODIATRY | Age: 81
End: 2021-10-04
Payer: MEDICARE

## 2021-10-04 VITALS
SYSTOLIC BLOOD PRESSURE: 163 MMHG | HEIGHT: 61 IN | HEART RATE: 64 BPM | BODY MASS INDEX: 46.22 KG/M2 | DIASTOLIC BLOOD PRESSURE: 76 MMHG | TEMPERATURE: 96.3 F | WEIGHT: 244.8 LBS

## 2021-10-04 DIAGNOSIS — M1A.9XX0 CHRONIC GOUT WITHOUT TOPHUS, UNSPECIFIED CAUSE, UNSPECIFIED SITE: Primary | ICD-10-CM

## 2021-10-04 DIAGNOSIS — M15.9 OSTEOARTHRITIS OF MULTIPLE JOINTS, UNSPECIFIED OSTEOARTHRITIS TYPE: ICD-10-CM

## 2021-10-04 PROCEDURE — G8427 DOCREV CUR MEDS BY ELIG CLIN: HCPCS | Performed by: PODIATRIST

## 2021-10-04 PROCEDURE — 1090F PRES/ABSN URINE INCON ASSESS: CPT | Performed by: PODIATRIST

## 2021-10-04 PROCEDURE — 1101F PT FALLS ASSESS-DOCD LE1/YR: CPT | Performed by: PODIATRIST

## 2021-10-04 PROCEDURE — G8536 NO DOC ELDER MAL SCRN: HCPCS | Performed by: PODIATRIST

## 2021-10-04 PROCEDURE — G8753 SYS BP > OR = 140: HCPCS | Performed by: PODIATRIST

## 2021-10-04 PROCEDURE — 99213 OFFICE O/P EST LOW 20 MIN: CPT | Performed by: PODIATRIST

## 2021-10-04 PROCEDURE — G8417 CALC BMI ABV UP PARAM F/U: HCPCS | Performed by: PODIATRIST

## 2021-10-04 PROCEDURE — G8432 DEP SCR NOT DOC, RNG: HCPCS | Performed by: PODIATRIST

## 2021-10-04 PROCEDURE — G8400 PT W/DXA NO RESULTS DOC: HCPCS | Performed by: PODIATRIST

## 2021-10-04 PROCEDURE — G8754 DIAS BP LESS 90: HCPCS | Performed by: PODIATRIST

## 2021-10-04 NOTE — PROGRESS NOTES
1. Have you been to the ER, urgent care clinic since your last visit? Hospitalized since your last visit? No     2. Have you seen or consulted any other health care providers outside of the [address] [place] [address] since your last visit? Include any pap smears or colon screening. No     Chief Complaint   Patient presents with    Foot Pain     L foot pain; pt states the pain is mostly at night and feels like needles on the bottom of her feet.      Foot Exam    Foot Swelling    Numbness

## 2021-10-20 NOTE — PROGRESS NOTES
[facility]    Subjective:         Patient is a 80 y.o. female who is being seen as a returning pt for left ankle pain and thick/discolored toenails. She states the diclofenac that was prescribed during a previous office visit has helped. She denies any recent trauma. She states her pain still rises to the level of 6 out of 10. She states the pain is sharp in nature and shoots up the lateral side of her leg. She denies any systemic signs of infection. She denies any other pedal complaints    Past Medical History:   Diagnosis Date    Allergic rhinitis     Angina pectoris (HCC)     Arthritis     Cardiac murmur     Chronic ischemic heart disease     Diabetes (HCC)     Edema of left lower leg     GERD (gastroesophageal reflux disease)     Hyperlipidemia     Hypertension     Knee pain     Left tibialis posterior tendinitis     Lumbar radiculopathy     Morbid obesity (HCC)     Osteoarthritis     Sarcoidosis     Shortness of breath     Spondylosis      Past Surgical History:   Procedure Laterality Date    HX BREAST REDUCTION      30 yrs ago    HX CHOLECYSTECTOMY      HX CYST REMOVAL      IR CHOLECYSTOSTOMY PERCUTANEOUS         Family History   Family history unknown: Yes      Social History     Tobacco Use    Smoking status: Never Smoker    Smokeless tobacco: Never Used   Substance Use Topics    Alcohol use: Never     Allergies   Allergen Reactions    Clindamycin Seizures     Prior to Admission medications    Medication Sig Start Date End Date Taking? Authorizing Provider   cetirizine (Allergy Relief, cetirizine,) 10 mg tablet Take 1 Tablet by mouth daily. 9/16/21  Yes Norman Rey MD   diclofenac (VOLTAREN) 1 % gel Apply  to affected area four (4) times daily. Around and below lt knee joint.  9/15/21  Yes Norman Rey MD   metFORMIN (GLUCOPHAGE) 500 mg tablet Take 1 tablet by mouth once daily with breakfast 9/12/21  Yes Norman Rey MD   omeprazole (PRILOSEC) 40 mg capsule Take 1 Capsule by mouth daily. 7/9/21  Yes Tito Parish MD   albuterol (PROVENTIL HFA, VENTOLIN HFA, PROAIR HFA) 90 mcg/actuation inhaler Take 2 Puffs by inhalation every six (6) hours as needed for Wheezing. 6/15/21  Yes Tito Parish MD   pravastatin (PRAVACHOL) 40 mg tablet Take 1 Tablet by mouth daily. 5/19/21  Yes Tito Parish MD   losartan-hydroCHLOROthiazide (HYZAAR) 100-25 mg per tablet TAKE 1 TABLET BY MOUTH ONCE DAILY FOR 90 DAYS 5/3/21  Yes Tito Parish MD   acetaminophen (TylenoL) 325 mg tablet Take 650 mg by mouth every four (4) hours as needed for Pain. Yes Navarro, MD Neda   albuterol (ACCUNEB) 0.63 mg/3 mL nebulizer solution 0.63 mg by Nebulization route every six (6) hours as needed for Wheezing. Yes Navarro, MD Neda   albuterol (ProAir HFA) 90 mcg/actuation inhaler Take 2 Puffs by inhalation every four (4) hours as needed for Wheezing. Yes Navarro, MD Neda   allopurinoL (ZYLOPRIM) 300 mg tablet Take 300 mg by mouth daily. Yes Other, MD Neda   ammonium lactate (LAC-HYDRIN) 12 % lotion Apply  to affected area as needed. rub in to affected area well   Yes Navarro, MD Neda   aspirin 81 mg chewable tablet Take 81 mg by mouth daily. Yes Navarro, MD Neda   budesonide-formoteroL (Symbicort) 80-4.5 mcg/actuation HFAA Take 2 Puffs by inhalation two (2) times a day. Yes Other, MD Neda   calcium carbonate (TUMS) 200 mg calcium (500 mg) chew Take 1 Tab by mouth daily. Yes Navarro, MD Neda   carvediloL (Coreg) 12.5 mg tablet Take  by mouth two (2) times daily (with meals). Yes Other, MD Neda   fluticasone propionate (Flonase Allergy Relief) 50 mcg/actuation nasal spray 2 Sprays by Both Nostrils route daily. Yes Other, MD Neda   gabapentin (NEURONTIN) 100 mg capsule Take  by mouth three (3) times daily. Yes Navarro, MD Neda   isosorbide mononitrate ER (IMDUR) 30 mg tablet Take 30 mg by mouth daily.    Yes Other, MD Neda   multivitamin (ONE A DAY) tablet Take 1 Tab by mouth daily. Yes Other, MD Neda   nitroglycerin (Nitrostat) 0.3 mg SL tablet 0.3 mg by SubLINGual route every five (5) minutes as needed for Chest Pain. Yes Other, MD Neda   potassium chloride SA (MICRO-K) 10 mEq capsule Take 10 mEq by mouth daily. Yes Other, MD Neda   psyllium (METAMUCIL) powd Take 525 mg by mouth daily. Yes Other, MD Neda       Review of Systems   Constitutional: Negative. HENT: Negative. Eyes: Negative. Respiratory: Negative. Cardiovascular: Negative. Gastrointestinal: Negative. Endocrine: Negative. Genitourinary: Negative. Musculoskeletal: Positive for arthralgias. Skin: Negative. Allergic/Immunologic: Positive for environmental allergies. Neurological: Positive for numbness. Hematological: Negative. Psychiatric/Behavioral: Negative. All other systems reviewed and are negative. Objective:     Visit Vitals  BP (!) 163/76 (BP 1 Location: Right upper arm, BP Patient Position: Sitting, BP Cuff Size: Large adult)   Pulse 64   Temp (!) 96.3 °F (35.7 °C) (Temporal)   Ht 5' 1\" (1.549 m)   Wt 244 lb 12.8 oz (111 kg)   BMI 46.25 kg/m²       Physical Exam  Vitals reviewed. Constitutional:       Appearance: She is morbidly obese. Cardiovascular:      Pulses:           Dorsalis pedis pulses are 2+ on the right side and 2+ on the left side. Posterior tibial pulses are 2+ on the right side and 2+ on the left side. Pulmonary:      Effort: Pulmonary effort is normal.   Musculoskeletal:      Right lower leg: No edema. Left lower leg: Edema present. Right ankle: Normal.      Left ankle: Swelling present. Tenderness present. Decreased range of motion. Right foot: Normal range of motion. No deformity or bunion. Left foot: Normal range of motion. No deformity or bunion. Feet:      Right foot:      Protective Sensation: 10 sites tested. 10 sites sensed.       Skin integrity: Skin integrity normal.      Toenail Condition: Right toenails are abnormally thick. Fungal disease present. Left foot:      Protective Sensation: 10 sites tested. 10 sites sensed. Skin integrity: Skin integrity normal.      Toenail Condition: Left toenails are abnormally thick. Fungal disease present. Lymphadenopathy:      Lower Body: No right inguinal adenopathy. No left inguinal adenopathy. Skin:     General: Skin is warm. Capillary Refill: Capillary refill takes 2 to 3 seconds. Neurological:      Mental Status: She is alert and oriented to person, place, and time. Psychiatric:         Mood and Affect: Mood and affect normal.         Behavior: Behavior is cooperative. Data Review:   Study: XR ANKLE LT MIN 3 V     Clinical indication: Pain     Comparison: None.     Findings:     3 views.     No evidence of acute fracture or dislocation. Alignment is anatomic. Ankle  mortise is intact without evidence of widening. Posterior and plantar calcaneal  enthesophytes. No significant soft tissue swelling.        IMPRESSION  Impression:  No acute osseous abnormality. Impression:     Osteoarthritis  Gout  Onychomycosis    Recommendation:     Patient seen and evaluated in the office  Discussed and educated patient regarding her current medical condition  Marisela her left foot/ankle pain. Prescription was given for serum uric acid to be drawn to evaluate for worsening gout. She is also given a prescription for physical therapy to eval and treat. Patient verbalized understanding  She is to continue with the ammonium lactate 12% topical lotion for her thick/discolored toenails        [facility], [facility], [address] [place] [address] and [place] [address] [place] [address]  O: [phone]  F: [phone]  C: [phone]

## 2021-11-15 RX ORDER — POTASSIUM CHLORIDE 750 MG/1
10 CAPSULE, EXTENDED RELEASE ORAL DAILY
Qty: 90 CAPSULE | Refills: 0 | Status: SHIPPED | OUTPATIENT
Start: 2021-11-15 | End: 2022-02-13

## 2021-11-15 RX ORDER — PRAVASTATIN SODIUM 40 MG/1
40 TABLET ORAL DAILY
Qty: 90 TABLET | Refills: 0 | Status: SHIPPED | OUTPATIENT
Start: 2021-11-15 | End: 2021-12-15 | Stop reason: SDUPTHER

## 2021-11-16 RX ORDER — PRAVASTATIN SODIUM 40 MG/1
TABLET ORAL
Qty: 90 TABLET | Refills: 0 | Status: SHIPPED | OUTPATIENT
Start: 2021-11-16 | End: 2022-05-17 | Stop reason: SDUPTHER

## 2021-12-11 PROBLEM — E55.9 VITAMIN D DEFICIENCY: Status: RESOLVED | Noted: 2021-01-06 | Resolved: 2021-12-11

## 2021-12-15 ENCOUNTER — OFFICE VISIT (OUTPATIENT)
Dept: INTERNAL MEDICINE CLINIC | Age: 81
End: 2021-12-15
Payer: MEDICARE

## 2021-12-15 VITALS
BODY MASS INDEX: 44.93 KG/M2 | OXYGEN SATURATION: 96 % | RESPIRATION RATE: 12 BRPM | DIASTOLIC BLOOD PRESSURE: 74 MMHG | HEIGHT: 61 IN | SYSTOLIC BLOOD PRESSURE: 134 MMHG | HEART RATE: 72 BPM | WEIGHT: 238 LBS

## 2021-12-15 DIAGNOSIS — E78.00 PURE HYPERCHOLESTEROLEMIA: ICD-10-CM

## 2021-12-15 DIAGNOSIS — M1A.9XX0 CHRONIC GOUT WITHOUT TOPHUS, UNSPECIFIED CAUSE, UNSPECIFIED SITE: ICD-10-CM

## 2021-12-15 DIAGNOSIS — E11.9 CONTROLLED TYPE 2 DIABETES MELLITUS WITHOUT COMPLICATION, WITHOUT LONG-TERM CURRENT USE OF INSULIN (HCC): ICD-10-CM

## 2021-12-15 DIAGNOSIS — I25.9 ISCHEMIC HEART DISEASE: ICD-10-CM

## 2021-12-15 DIAGNOSIS — I10 ESSENTIAL HYPERTENSION: ICD-10-CM

## 2021-12-15 DIAGNOSIS — M54.16 LUMBAR RADICULOPATHY: ICD-10-CM

## 2021-12-15 DIAGNOSIS — I87.2 VENOUS INSUFFICIENCY OF BOTH LOWER EXTREMITIES: ICD-10-CM

## 2021-12-15 DIAGNOSIS — E66.01 MORBID OBESITY (HCC): ICD-10-CM

## 2021-12-15 DIAGNOSIS — J45.20 MILD INTERMITTENT ASTHMA WITHOUT COMPLICATION: ICD-10-CM

## 2021-12-15 DIAGNOSIS — D86.9 SARCOIDOSIS: ICD-10-CM

## 2021-12-15 DIAGNOSIS — K21.9 GASTROESOPHAGEAL REFLUX DISEASE WITHOUT ESOPHAGITIS: ICD-10-CM

## 2021-12-15 LAB — GLUCOSE POC: 93 MG/DL

## 2021-12-15 PROCEDURE — 99214 OFFICE O/P EST MOD 30 MIN: CPT | Performed by: INTERNAL MEDICINE

## 2021-12-15 PROCEDURE — G8400 PT W/DXA NO RESULTS DOC: HCPCS | Performed by: INTERNAL MEDICINE

## 2021-12-15 PROCEDURE — G8536 NO DOC ELDER MAL SCRN: HCPCS | Performed by: INTERNAL MEDICINE

## 2021-12-15 PROCEDURE — 1090F PRES/ABSN URINE INCON ASSESS: CPT | Performed by: INTERNAL MEDICINE

## 2021-12-15 PROCEDURE — 82962 GLUCOSE BLOOD TEST: CPT | Performed by: INTERNAL MEDICINE

## 2021-12-15 PROCEDURE — G8427 DOCREV CUR MEDS BY ELIG CLIN: HCPCS | Performed by: INTERNAL MEDICINE

## 2021-12-15 PROCEDURE — G8752 SYS BP LESS 140: HCPCS | Performed by: INTERNAL MEDICINE

## 2021-12-15 PROCEDURE — G8754 DIAS BP LESS 90: HCPCS | Performed by: INTERNAL MEDICINE

## 2021-12-15 PROCEDURE — 1101F PT FALLS ASSESS-DOCD LE1/YR: CPT | Performed by: INTERNAL MEDICINE

## 2021-12-15 PROCEDURE — G8510 SCR DEP NEG, NO PLAN REQD: HCPCS | Performed by: INTERNAL MEDICINE

## 2021-12-15 PROCEDURE — G8417 CALC BMI ABV UP PARAM F/U: HCPCS | Performed by: INTERNAL MEDICINE

## 2021-12-15 RX ORDER — DEXTROMETHORPHAN HYDROBROMIDE, GUAIFENESIN 5; 100 MG/5ML; MG/5ML
650 LIQUID ORAL
Qty: 180 TABLET | Refills: 0 | Status: SHIPPED | OUTPATIENT
Start: 2021-12-15 | End: 2022-07-09

## 2021-12-15 NOTE — PROGRESS NOTES
Chief Complaint   Patient presents with    Follow Up Chronic Condition    Hypertension    Diabetes     80 in office     Cholesterol Problem     Visit Vitals  /67 (BP 1 Location: Right upper arm, BP Patient Position: Sitting, BP Cuff Size: Adult)   Pulse 71   Resp 12   Ht 5' 1\" (1.549 m)   Wt 238 lb (108 kg)   SpO2 96%   BMI 44.97 kg/m²       1. Have you been to the ER, urgent care clinic since your last visit? Hospitalized since your last visit? No    2. Have you seen or consulted any other health care providers outside of the [address] [place] [address] since your last visit? Include any pap smears or colon screening.  No

## 2021-12-15 NOTE — PROGRESS NOTES
Nelly Mathur is a 80 y.o. female and presents with Follow Up Chronic Condition, Hypertension, Diabetes (80 in office ), and Cholesterol Problem      Ms. Holley Sandhoff came for regular follow-up and she has multiple comorbidities along with her BMI at 44.97. She follows with Dr. WELLSTAR WEEKS [facility] as her cardiologist.  She told me she cannot do physical therapy because she gets short of breath after exertion or walking which is a chronic problem and she has made appointment for next Thursday with Dr. MÉNDEZ WEEKS [facility]. She was slightly upset that Dr. WELLSTAR [facility] does only EKG and has not done any recent echocardiogram or stress test.  She will visit her and accordingly she will decide whether she wants to change cardiologist or not. She is known case of sarcoidosis and ischemic heart disease and she has mild type 2 diabetes mellitus and she had her labs done in June. Her hemoglobin A1c was 6.9% in June and I ordered labs today. She is taking Metformin. She also has history of gout but no recent flareup. She has no depression. She has severe DJD causing chronic back pain and left foot pain. She does not think she should go for surgery due to her comorbidities. She is up-to-date with the Covid vaccine. Review of Systems    Review of Systems   Constitutional: Negative. HENT: Negative for sinus pain and sore throat. Eyes: Negative for blurred vision. Respiratory: Positive for shortness of breath. Negative for cough and wheezing. She has appointment with DR avila/cardiology on next Thursday,she has chronic sob on exertion no. Cough and no chest pain no sweating   Cardiovascular: Negative. She has appointment with DR avila/cardiology on next Thursday,she has chronic sob on exertion no. Cough and no chest pain no sweating     Gastrointestinal: Negative. Genitourinary: Negative. Musculoskeletal:        Djd in back and foot. .dose not want to do PT   Neurological: Negative for dizziness, tingling, tremors, sensory change and headaches. Psychiatric/Behavioral: Negative for depression and suicidal ideas. The patient is not nervous/anxious. Past Medical History:   Diagnosis Date    Allergic rhinitis     Angina pectoris (HCC)     Arthritis     Cardiac murmur     Chronic ischemic heart disease     Diabetes (HCC)     Edema of left lower leg     GERD (gastroesophageal reflux disease)     Hyperlipidemia     Hypertension     Knee pain     Left tibialis posterior tendinitis     Lumbar radiculopathy     Morbid obesity (HCC)     Osteoarthritis     Sarcoidosis     Shortness of breath     Spondylosis      Past Surgical History:   Procedure Laterality Date    HX BREAST REDUCTION      30 yrs ago    HX CHOLECYSTECTOMY      HX CYST REMOVAL      IR CHOLECYSTOSTOMY PERCUTANEOUS       Social History     Socioeconomic History    Marital status:    Tobacco Use    Smoking status: Never Smoker    Smokeless tobacco: Never Used   Vaping Use    Vaping Use: Never used   Substance and Sexual Activity    Alcohol use: Never    Drug use: Never     Family History   Family history unknown: Yes     Current Outpatient Medications   Medication Sig Dispense Refill    acetaminophen (Tylenol 8 Hour) 650 mg TbER Take 1 Tablet by mouth two (2) times daily as needed for Pain. 180 Tablet 0    metFORMIN (GLUCOPHAGE) 500 mg tablet Take 1 tablet by mouth once daily with breakfast 90 Tablet 2    pravastatin (PRAVACHOL) 40 mg tablet Take 1 tablet by mouth once daily 90 Tablet 0    potassium chloride SA (MICRO-K) 10 mEq capsule Take 1 Capsule by mouth daily for 90 days. 90 Capsule 0    losartan-hydroCHLOROthiazide (HYZAAR) 100-25 mg per tablet Take 1 tablet by mouth once daily for 90 days 90 Tablet 2    cetirizine (Allergy Relief, cetirizine,) 10 mg tablet Take 1 Tablet by mouth daily. 90 Tablet 1    omeprazole (PRILOSEC) 40 mg capsule Take 1 Capsule by mouth daily.  90 Capsule 2    albuterol (PROVENTIL HFA, VENTOLIN HFA, PROAIR HFA) 90 mcg/actuation inhaler Take 2 Puffs by inhalation every six (6) hours as needed for Wheezing. 1 Inhaler 3    albuterol (ACCUNEB) 0.63 mg/3 mL nebulizer solution 0.63 mg by Nebulization route every six (6) hours as needed for Wheezing.  albuterol (ProAir HFA) 90 mcg/actuation inhaler Take 2 Puffs by inhalation every four (4) hours as needed for Wheezing.  allopurinoL (ZYLOPRIM) 300 mg tablet Take 300 mg by mouth daily.  ammonium lactate (LAC-HYDRIN) 12 % lotion Apply  to affected area as needed. rub in to affected area well      aspirin 81 mg chewable tablet Take 81 mg by mouth daily.  budesonide-formoteroL (Symbicort) 80-4.5 mcg/actuation HFAA Take 2 Puffs by inhalation two (2) times a day.  calcium carbonate (TUMS) 200 mg calcium (500 mg) chew Take 1 Tab by mouth daily.  carvediloL (Coreg) 12.5 mg tablet Take  by mouth two (2) times daily (with meals).  fluticasone propionate (Flonase Allergy Relief) 50 mcg/actuation nasal spray 2 Sprays by Both Nostrils route daily.  gabapentin (NEURONTIN) 100 mg capsule Take  by mouth three (3) times daily.  isosorbide mononitrate ER (IMDUR) 30 mg tablet Take 30 mg by mouth daily.  multivitamin (ONE A DAY) tablet Take 1 Tab by mouth daily.  nitroglycerin (Nitrostat) 0.3 mg SL tablet 0.3 mg by SubLINGual route every five (5) minutes as needed for Chest Pain.  psyllium (METAMUCIL) powd Take 525 mg by mouth daily.  diclofenac (VOLTAREN) 1 % gel Apply  to affected area four (4) times daily. Around and below lt knee joint.  (Patient not taking: Reported on 12/15/2021) 350 g 1     Allergies   Allergen Reactions    Clindamycin Seizures       Objective:  Visit Vitals  /74 (BP 1 Location: Left upper arm, BP Patient Position: Sitting, BP Cuff Size: Large adult)   Pulse 72   Resp 12   Ht 5' 1\" (1.549 m)   Wt 238 lb (108 kg)   SpO2 96%   BMI 44.97 kg/m²       Physical Exam:   Constitutional: General Appearance: Pleasant. Level of Distress: NAD. Psychiatric: Mental Status: normal mood and affect Orientation: to time, place, and person. ,normal eye contact. Head: Head: normocephalic and atraumatic. Eyes: Pupils: PERRLA. Sclerae: non-icteric. Neck: Neck: supple, trachea midline, and no masses. Lymph Nodes: no cervical LAD. Thyroid: no enlargement or nodules and non-tender. Lungs: Respiratory effort: no dyspnea. Auscultation: no wheezing, rales/crackles, or rhonchi and breath sounds normal and good air movement. Cardiovascular: Apical Impulse: not displaced. Heart Auscultation: normal S1 and S2; no murmurs, rubs, or gallops; and RRR. Neck vessels: no carotid bruits. Pulses including femoral / pedal: normal throughout. Abdomen: Bowel Sounds: normal. Inspection and Palpation: no tenderness, guarding, or masses and soft and non-distended. Liver: non-tender and no hepatomegaly. Spleen: non-tender and no splenomegaly. Musculoskeletal[de-identified] Extremities: no edema,no varicosities. No Calf tenderness. Neurologic: Gait and Station: normal gait and station. Motor Strength normal right and left. Skin: Inspection and palpation: no rash, lesions, or ulcer. Results for orders placed or performed during the hospital encounter of 09/20/21   DUPLEX LOWER EXT VENOUS REFLUX BILAT   Result Value Ref Range    Left GSV BK Dist Rfx 6.0 s    Left GSV BK Mid Rfx 3.0 s    Right GSV BK Dist Rfx 6.0 s    Right GSV BK Mid Rfx 6.0 s       Assessment/Plan:      ICD-10-CM ICD-9-CM    1. Essential hypertension  I10 401.9 CBC WITH AUTOMATED DIFF      METABOLIC PANEL, COMPREHENSIVE   2. Controlled type 2 diabetes mellitus without complication, without long-term current use of insulin (HCC)  E11.9 250.00 AMB POC GLUCOSE BLOOD, BY GLUCOSE MONITORING DEVICE      HEMOGLOBIN A1C WITH EAG   3. Mild intermittent asthma without complication  K38.04 159.91    4. Pure hypercholesterolemia  E78.00 272.0 LIPID PANEL   5.  Chronic gout without tophus, unspecified cause, unspecified site  M1A. 9XX0 274.02 URIC ACID   6. Lumbar radiculopathy  M54.16 724.4    7. Ischemic heart disease  I25.9 414.9    8. Gastroesophageal reflux disease without esophagitis  K21.9 530.81    9. Venous insufficiency of both lower extremities  I87.2 459.81    10. Morbid obesity ([facility]  E66.01 278.01    11. Sarcoidosis  D86.9 135    12. BMI 45.0-49.9, adult ([facility]  Z68.42 V85.42      Orders Placed This Encounter    CBC WITH AUTOMATED DIFF    METABOLIC PANEL, COMPREHENSIVE    LIPID PANEL    HEMOGLOBIN A1C WITH EAG    URIC ACID    AMB POC GLUCOSE BLOOD, BY GLUCOSE MONITORING DEVICE    acetaminophen (Tylenol 8 Hour) 650 mg TbER     Sig: Take 1 Tablet by mouth two (2) times daily as needed for Pain. Dispense:  180 Tablet     Refill:  0       Hypertension controlled continued on losartan hydrochlorothiazide 100/25 mg once a day. Continue carvedilol 12.5 mg twice a day. Diet low in sodium. Type 2 diabetes mellitus with hemoglobin A1c 6.9% and she has made changes in her diet and she has done intentional weight loss and continued on Metformin 500 mg once a day and repeat labs ordered and continue low-dose aspirin with stating she cannot do much exercise or physical activity she gets short of breath due to her heart condition and having sarcoidosis and she needed handicap sticker that I provided for permanent handicap sticker because of her orthopedic problem, heart problem and lung problemand multiple comorbidities. Hypercholesterolemia continue pravastatin 40 mg at bedtime with diet low in fat. GERD taking omeprazole and she is doing intentional weight loss. History of hypokalemia taking potassium  supplementation. Gout taking allopurinol 300 mg/day. History of sarcoidosis and asthma taking albuterol inhaler and having nebulizer machine. Allergic rhinitis taking cetirizine.   Continue fluticasone nasal spray    History of ischemic heart disease under care of Dr. Jonel Bojorquez awaiting for the notes after seen by her on next Thursday continue isosorbide mononitrate ER 30 mg/day. DJD in the back and the left foot recommended to  shock feet in the warm water and to do exercise and take Tylenol 650 mg twice a day as needed and she told me she used to see orthopedic spine surgeon and she cannot handle physical therapy due to her heart problem and lung problem. Symptomatic treatment. Try to lose weight. BMI 44.97 heart healthy diet. She is up to date with her flu vaccine and Covid booster doses. Follow-up in 3 months. Labs ordered. Refills given. Answered all her questions. lose weight, follow low fat diet, follow low salt diet, continue present plan, routine labs ordered, call if any problems    There are no Patient Instructions on file for this visit. Follow-up and Dispositions    · Return in about 3 months (around 3/15/2022) for follow up for chronic conditionlabs.

## 2022-01-05 LAB
ALBUMIN SERPL-MCNC: 4.3 G/DL (ref 3.6–4.6)
ALBUMIN/GLOB SERPL: 1.6 {RATIO} (ref 1.2–2.2)
ALP SERPL-CCNC: 49 IU/L (ref 44–121)
ALT SERPL-CCNC: 13 IU/L (ref 0–32)
AST SERPL-CCNC: 16 IU/L (ref 0–40)
BASOPHILS # BLD AUTO: 0.1 X10E3/UL (ref 0–0.2)
BASOPHILS NFR BLD AUTO: 1 %
BILIRUB SERPL-MCNC: 0.3 MG/DL (ref 0–1.2)
BUN SERPL-MCNC: 19 MG/DL (ref 8–27)
BUN/CREAT SERPL: 16 (ref 12–28)
CALCIUM SERPL-MCNC: 9.7 MG/DL (ref 8.7–10.3)
CHLORIDE SERPL-SCNC: 104 MMOL/L (ref 96–106)
CHOLEST SERPL-MCNC: 171 MG/DL (ref 100–199)
CO2 SERPL-SCNC: 29 MMOL/L (ref 20–29)
CREAT SERPL-MCNC: 1.21 MG/DL (ref 0.57–1)
EOSINOPHIL # BLD AUTO: 0.4 X10E3/UL (ref 0–0.4)
EOSINOPHIL NFR BLD AUTO: 4 %
ERYTHROCYTE [DISTWIDTH] IN BLOOD BY AUTOMATED COUNT: 14.7 % (ref 11.7–15.4)
EST. AVERAGE GLUCOSE BLD GHB EST-MCNC: 143 MG/DL
GLOBULIN SER CALC-MCNC: 2.7 G/DL (ref 1.5–4.5)
GLUCOSE SERPL-MCNC: 84 MG/DL (ref 65–99)
HBA1C MFR BLD: 6.6 % (ref 4.8–5.6)
HCT VFR BLD AUTO: 40.8 % (ref 34–46.6)
HDLC SERPL-MCNC: 50 MG/DL
HGB BLD-MCNC: 13 G/DL (ref 11.1–15.9)
IMM GRANULOCYTES # BLD AUTO: 0 X10E3/UL (ref 0–0.1)
IMM GRANULOCYTES NFR BLD AUTO: 0 %
LDLC SERPL CALC-MCNC: 94 MG/DL (ref 0–99)
LYMPHOCYTES # BLD AUTO: 2.7 X10E3/UL (ref 0.7–3.1)
LYMPHOCYTES NFR BLD AUTO: 27 %
MCH RBC QN AUTO: 24.6 PG (ref 26.6–33)
MCHC RBC AUTO-ENTMCNC: 31.9 G/DL (ref 31.5–35.7)
MCV RBC AUTO: 77 FL (ref 79–97)
MONOCYTES # BLD AUTO: 0.9 X10E3/UL (ref 0.1–0.9)
MONOCYTES NFR BLD AUTO: 9 %
NEUTROPHILS # BLD AUTO: 6 X10E3/UL (ref 1.4–7)
NEUTROPHILS NFR BLD AUTO: 59 %
PLATELET # BLD AUTO: 312 X10E3/UL (ref 150–450)
POTASSIUM SERPL-SCNC: 3.8 MMOL/L (ref 3.5–5.2)
PROT SERPL-MCNC: 7 G/DL (ref 6–8.5)
RBC # BLD AUTO: 5.28 X10E6/UL (ref 3.77–5.28)
SODIUM SERPL-SCNC: 148 MMOL/L (ref 134–144)
TRIGL SERPL-MCNC: 157 MG/DL (ref 0–149)
URATE SERPL-MCNC: 4.7 MG/DL (ref 3.1–7.9)
VLDLC SERPL CALC-MCNC: 27 MG/DL (ref 5–40)
WBC # BLD AUTO: 10.1 X10E3/UL (ref 3.4–10.8)

## 2022-01-05 NOTE — PROGRESS NOTES
Please call her that her lab results are improved her kidney function is still stable and WBC count normal and hemoglobin normal and cholesterol count is stable but cut back on fried food and fast food and regular soda and juice and try to lose weight and her hemoglobin A1c improved from 6.9-6.6 being on Metformin however Metformin alone is not sufficient she has to have a good healthy lifestyle and healthy eating habits and portion control

## 2022-01-06 RX ORDER — ALLOPURINOL 300 MG/1
300 TABLET ORAL DAILY
Qty: 90 TABLET | Refills: 1 | Status: SHIPPED | OUTPATIENT
Start: 2022-01-06 | End: 2022-06-28

## 2022-02-04 ENCOUNTER — TELEPHONE (OUTPATIENT)
Dept: INTERNAL MEDICINE CLINIC | Age: 82
End: 2022-02-04

## 2022-02-21 ENCOUNTER — TELEPHONE (OUTPATIENT)
Dept: INTERNAL MEDICINE CLINIC | Age: 82
End: 2022-02-21

## 2022-02-21 RX ORDER — POTASSIUM CHLORIDE 750 MG/1
10 TABLET, EXTENDED RELEASE ORAL DAILY
Qty: 90 TABLET | Refills: 0 | Status: SHIPPED | OUTPATIENT
Start: 2022-02-21 | End: 2022-05-17 | Stop reason: SDUPTHER

## 2022-02-21 NOTE — TELEPHONE ENCOUNTER
----- Message from Leisa Payton sent at 2/21/2022 11:06 AM EST -----  Subject: Message to Provider    QUESTIONS  Information for Provider? Patient needs a prescription for Potassium   chloride 10 mg once daily [facility] on [address] [phone]. Patient   wants New doctor's name on the prescription instead of the old doctor. If   doctor any questions, please call the patient back. Patient has about six   pills left.  ---------------------------------------------------------------------------  --------------  CALL BACK INFO  What is the best way for the office to contact you? OK to leave message on   voicemail  Preferred Call Back Phone Number? [phone]  ---------------------------------------------------------------------------  --------------  SCRIPT ANSWERS  Relationship to Patient?  Self

## 2022-03-06 PROBLEM — N95.9 MENOPAUSAL AND POSTMENOPAUSAL DISORDER: Status: ACTIVE | Noted: 2022-03-06

## 2022-03-06 PROBLEM — Z86.2 HISTORY OF SARCOIDOSIS: Status: ACTIVE | Noted: 2022-03-06

## 2022-03-16 ENCOUNTER — TELEPHONE (OUTPATIENT)
Dept: INTERNAL MEDICINE CLINIC | Age: 82
End: 2022-03-16

## 2022-03-16 NOTE — TELEPHONE ENCOUNTER
----- Message from Hatfield Franckia sent at 3/16/2022 12:51 PM EDT -----  Subject: Appointment Request    Reason for Call: Routine (Patient Request) No Script    QUESTIONS  Type of Appointment? Established Patient  Reason for appointment request? No appointments available during search  Additional Information for Provider? pt needs to r/s her 3 mo f/u that she   missed on 3/15/22 it is showing no availability from now until june. please call pt back  ---------------------------------------------------------------------------  --------------  CALL BACK INFO  What is the best way for the office to contact you? OK to leave message on   voicemail  Preferred Call Back Phone Number? [phone]  ---------------------------------------------------------------------------  --------------  SCRIPT ANSWERS  Relationship to Patient? Self  (Is the patient requesting to see the provider for a procedure?)? No  (Is the patient requesting to see the provider urgently  today or   tomorrow. )? No  Have you been diagnosed with, awaiting test results for, or told that you   are suspected of having COVID-19 (Coronavirus)? (If patient has tested   negative or was tested as a requirement for work, school, or travel and   not based on symptoms, answer no)? No  Within the past 10 days have you developed any of the following symptoms   (answer no if symptoms have been present longer than 10 days or began   more than 10 days ago)? Fever or Chills, Cough, Shortness of breath or   difficulty breathing, Loss of taste or smell, Sore throat, Nasal   congestion, Sneezing or runny nose, Fatigue or generalized body aches   (answer no if pain is specific to a body part e.g. back pain), Diarrhea,   Headache? No  Have you had close contact with someone with COVID-19 in the last 7 days? No  (Service Expert  click yes below to proceed with [employer] As Usual   Scheduling)?  Yes

## 2022-03-18 PROBLEM — M79.662 BILATERAL CALF PAIN: Status: ACTIVE | Noted: 2021-09-15

## 2022-03-18 PROBLEM — I25.9 ISCHEMIC HEART DISEASE: Status: ACTIVE | Noted: 2021-01-06

## 2022-03-18 PROBLEM — M15.9 OSTEOARTHRITIS OF MULTIPLE JOINTS, UNSPECIFIED OSTEOARTHRITIS TYPE: Status: ACTIVE | Noted: 2021-01-06

## 2022-03-18 PROBLEM — I87.2 VENOUS INSUFFICIENCY OF BOTH LOWER EXTREMITIES: Status: ACTIVE | Noted: 2021-09-15

## 2022-03-18 PROBLEM — J45.20 MILD INTERMITTENT ASTHMA WITHOUT COMPLICATION: Status: ACTIVE | Noted: 2021-01-06

## 2022-03-18 PROBLEM — J01.90 ACUTE NON-RECURRENT SINUSITIS, UNSPECIFIED LOCATION: Status: ACTIVE | Noted: 2021-03-12

## 2022-03-18 PROBLEM — M79.661 BILATERAL CALF PAIN: Status: ACTIVE | Noted: 2021-09-15

## 2022-03-18 PROBLEM — I10 ESSENTIAL HYPERTENSION: Status: ACTIVE | Noted: 2021-01-06

## 2022-03-18 PROBLEM — E11.9 CONTROLLED TYPE 2 DIABETES MELLITUS WITHOUT COMPLICATION, WITHOUT LONG-TERM CURRENT USE OF INSULIN (HCC): Status: ACTIVE | Noted: 2021-09-15

## 2022-03-19 PROBLEM — M1A.9XX0 CHRONIC GOUT WITHOUT TOPHUS, UNSPECIFIED CAUSE, UNSPECIFIED SITE: Status: ACTIVE | Noted: 2021-01-06

## 2022-03-19 PROBLEM — E78.00 PURE HYPERCHOLESTEROLEMIA: Status: ACTIVE | Noted: 2021-01-06

## 2022-03-19 PROBLEM — E66.01 MORBID OBESITY (HCC): Status: ACTIVE | Noted: 2021-01-06

## 2022-03-19 PROBLEM — M54.16 LUMBAR RADICULOPATHY: Status: ACTIVE | Noted: 2021-01-06

## 2022-03-19 PROBLEM — M25.572 CHRONIC PAIN OF LEFT ANKLE: Status: ACTIVE | Noted: 2021-01-06

## 2022-03-19 PROBLEM — G89.29 CHRONIC PAIN OF LEFT ANKLE: Status: ACTIVE | Noted: 2021-01-06

## 2022-03-19 PROBLEM — J30.9 ALLERGIC RHINITIS, UNSPECIFIED SEASONALITY, UNSPECIFIED TRIGGER: Status: ACTIVE | Noted: 2021-01-06

## 2022-03-19 PROBLEM — B35.1 ONYCHOMYCOSIS: Status: ACTIVE | Noted: 2021-01-06

## 2022-03-19 PROBLEM — D86.9 SARCOIDOSIS: Status: ACTIVE | Noted: 2021-01-06

## 2022-03-19 PROBLEM — Z86.2 HISTORY OF SARCOIDOSIS: Status: ACTIVE | Noted: 2022-03-06

## 2022-03-20 PROBLEM — N95.9 MENOPAUSAL AND POSTMENOPAUSAL DISORDER: Status: ACTIVE | Noted: 2022-03-06

## 2022-03-20 PROBLEM — R60.9 PERIPHERAL EDEMA: Status: ACTIVE | Noted: 2021-09-15

## 2022-03-20 PROBLEM — R60.0 PERIPHERAL EDEMA: Status: ACTIVE | Noted: 2021-09-15

## 2022-03-20 PROBLEM — K21.9 GASTROESOPHAGEAL REFLUX DISEASE WITHOUT ESOPHAGITIS: Status: ACTIVE | Noted: 2021-01-06

## 2022-03-25 ENCOUNTER — TELEPHONE (OUTPATIENT)
Dept: INTERNAL MEDICINE CLINIC | Age: 82
End: 2022-03-25

## 2022-03-25 NOTE — TELEPHONE ENCOUNTER
----- Message from Christy Mata sent at 3/24/2022  4:00 PM EDT -----  Subject: Message to Provider    QUESTIONS  Information for Provider? pt calling to see there have been any   cancelations as she missed her appt on 3/15 and could not get another appt   till 7/19 but would like to be seen sooner then 7/19 Please call if there   are any cancelations  ---------------------------------------------------------------------------  --------------  CALL BACK INFO  What is the best way for the office to contact you? OK to leave message on   voicemail  Preferred Call Back Phone Number?  [phone]  ---------------------------------------------------------------------------  --------------  SCRIPT ANSWERS  undefined

## 2022-04-06 PROBLEM — Z11.59 NEED FOR HEPATITIS C SCREENING TEST: Status: ACTIVE | Noted: 2022-04-06

## 2022-04-06 PROBLEM — J01.90 ACUTE NON-RECURRENT SINUSITIS, UNSPECIFIED LOCATION: Status: RESOLVED | Noted: 2021-03-12 | Resolved: 2022-04-06

## 2022-04-06 PROBLEM — D86.9 SARCOIDOSIS: Status: RESOLVED | Noted: 2021-01-06 | Resolved: 2022-04-06

## 2022-04-07 ENCOUNTER — OFFICE VISIT (OUTPATIENT)
Dept: INTERNAL MEDICINE CLINIC | Age: 82
End: 2022-04-07
Payer: MEDICARE

## 2022-04-07 VITALS
WEIGHT: 227.6 LBS | RESPIRATION RATE: 18 BRPM | DIASTOLIC BLOOD PRESSURE: 64 MMHG | HEIGHT: 61 IN | SYSTOLIC BLOOD PRESSURE: 130 MMHG | HEART RATE: 72 BPM | BODY MASS INDEX: 42.97 KG/M2 | TEMPERATURE: 96 F | OXYGEN SATURATION: 95 %

## 2022-04-07 DIAGNOSIS — I87.2 VENOUS INSUFFICIENCY OF BOTH LOWER EXTREMITIES: ICD-10-CM

## 2022-04-07 DIAGNOSIS — N95.9 MENOPAUSAL AND POSTMENOPAUSAL DISORDER: ICD-10-CM

## 2022-04-07 DIAGNOSIS — Z78.0 ENCOUNTER FOR OSTEOPOROSIS SCREENING IN ASYMPTOMATIC POSTMENOPAUSAL PATIENT: ICD-10-CM

## 2022-04-07 DIAGNOSIS — E66.01 MORBID OBESITY (HCC): ICD-10-CM

## 2022-04-07 DIAGNOSIS — K21.9 GASTROESOPHAGEAL REFLUX DISEASE WITHOUT ESOPHAGITIS: ICD-10-CM

## 2022-04-07 DIAGNOSIS — M1A.9XX0 CHRONIC GOUT WITHOUT TOPHUS, UNSPECIFIED CAUSE, UNSPECIFIED SITE: ICD-10-CM

## 2022-04-07 DIAGNOSIS — M54.16 LUMBAR RADICULOPATHY: ICD-10-CM

## 2022-04-07 DIAGNOSIS — E11.9 CONTROLLED TYPE 2 DIABETES MELLITUS WITHOUT COMPLICATION, WITHOUT LONG-TERM CURRENT USE OF INSULIN (HCC): ICD-10-CM

## 2022-04-07 DIAGNOSIS — J30.9 ALLERGIC RHINITIS, UNSPECIFIED SEASONALITY, UNSPECIFIED TRIGGER: ICD-10-CM

## 2022-04-07 DIAGNOSIS — I10 ESSENTIAL HYPERTENSION: ICD-10-CM

## 2022-04-07 DIAGNOSIS — M15.9 OSTEOARTHRITIS OF MULTIPLE JOINTS, UNSPECIFIED OSTEOARTHRITIS TYPE: ICD-10-CM

## 2022-04-07 DIAGNOSIS — Z13.820 ENCOUNTER FOR OSTEOPOROSIS SCREENING IN ASYMPTOMATIC POSTMENOPAUSAL PATIENT: ICD-10-CM

## 2022-04-07 DIAGNOSIS — E78.00 PURE HYPERCHOLESTEROLEMIA: ICD-10-CM

## 2022-04-07 DIAGNOSIS — Z86.2 HISTORY OF SARCOIDOSIS: ICD-10-CM

## 2022-04-07 DIAGNOSIS — J45.20 MILD INTERMITTENT ASTHMA WITHOUT COMPLICATION: ICD-10-CM

## 2022-04-07 DIAGNOSIS — Z11.59 NEED FOR HEPATITIS C SCREENING TEST: ICD-10-CM

## 2022-04-07 DIAGNOSIS — I25.9 ISCHEMIC HEART DISEASE: ICD-10-CM

## 2022-04-07 PROCEDURE — 99214 OFFICE O/P EST MOD 30 MIN: CPT | Performed by: INTERNAL MEDICINE

## 2022-04-07 PROCEDURE — G8752 SYS BP LESS 140: HCPCS | Performed by: INTERNAL MEDICINE

## 2022-04-07 PROCEDURE — G8417 CALC BMI ABV UP PARAM F/U: HCPCS | Performed by: INTERNAL MEDICINE

## 2022-04-07 PROCEDURE — G8536 NO DOC ELDER MAL SCRN: HCPCS | Performed by: INTERNAL MEDICINE

## 2022-04-07 PROCEDURE — G8510 SCR DEP NEG, NO PLAN REQD: HCPCS | Performed by: INTERNAL MEDICINE

## 2022-04-07 PROCEDURE — 1090F PRES/ABSN URINE INCON ASSESS: CPT | Performed by: INTERNAL MEDICINE

## 2022-04-07 PROCEDURE — 1101F PT FALLS ASSESS-DOCD LE1/YR: CPT | Performed by: INTERNAL MEDICINE

## 2022-04-07 PROCEDURE — G8754 DIAS BP LESS 90: HCPCS | Performed by: INTERNAL MEDICINE

## 2022-04-07 PROCEDURE — G8427 DOCREV CUR MEDS BY ELIG CLIN: HCPCS | Performed by: INTERNAL MEDICINE

## 2022-04-07 PROCEDURE — G8400 PT W/DXA NO RESULTS DOC: HCPCS | Performed by: INTERNAL MEDICINE

## 2022-04-07 RX ORDER — METRONIDAZOLE 500 MG/1
TABLET ORAL 3 TIMES DAILY
COMMUNITY
End: 2022-07-09

## 2022-04-07 RX ORDER — DICYCLOMINE HYDROCHLORIDE 10 MG/1
10 CAPSULE ORAL 3 TIMES DAILY
COMMUNITY
Start: 2022-04-05 | End: 2022-07-09

## 2022-04-07 NOTE — PROGRESS NOTES
Jairon Sauceda is a 80 y.o. female and presents with Follow Up Chronic Condition, GERD, Diabetes, and Cholesterol Problem      Ms. Adelina Blanton came for regular follow-up. In the interval time she has consulted gastroenterologist Dr. Humphries Ran his  physician assistant for abdominal pain and discomfort she underwent  lab work including stool exam and BMP her potassium was 3.4 it was done on 22nd March stool was negative for ova parasite and other bacteria was she has been given antibiotic and she finished metronidazole and takes dicyclomine. Recommended the dicyclomine to be taken as needed at her age. She has history of heart problem she follows cardiologist Dr. Sujit Mcelroy having ischemic heart disease her blood pressure is controlled with current medication she had her hemoglobin A1c done it was 6.6% in January from 6.9% in June 2021 after being on Metformin 500 mg once a day with meal I told her to hold Metformin to see if any relief and abdominal discomfort she does take with the food she is going for colonoscopy by gastroenterologist Dr. Kristel Romeo in month of May. She agreed to do blood work. If needed I will decrease her HCTZ 12.5 mg/day. She has multiple comorbidities overall she is doing good no asthma exacerbation had history of sarcoidosis but that is not flaring up her diabetic foot exam is normal dorsalis pedis palpable and monofilament test negative no depression. Review of Systems  Morbid obese with pleasant personality  Review of Systems   Constitutional: Negative. HENT: Negative for congestion and sore throat. Eyes: Negative for blurred vision. Respiratory: Negative for cough and wheezing. Cardiovascular: Negative. Gastrointestinal: Negative for blood in stool, constipation, diarrhea, heartburn, nausea and vomiting. Worked up by gastro enterologist Dr Kristel Romeo for abdominal discomfort ,I stopped metformin 500 mg once a day. Genitourinary: Negative. Musculoskeletal: Negative. Neurological: Negative for dizziness, tingling and headaches. Endo/Heme/Allergies: Negative for polydipsia. Psychiatric/Behavioral: Negative. Past Medical History:   Diagnosis Date    Allergic rhinitis     Angina pectoris (HCC)     Arthritis     Cardiac murmur     Chronic ischemic heart disease     Diabetes (HCC)     Edema of left lower leg     GERD (gastroesophageal reflux disease)     Hyperlipidemia     Hypertension     Knee pain     Left tibialis posterior tendinitis     Lumbar radiculopathy     Morbid obesity (HCC)     Osteoarthritis     Sarcoidosis     Shortness of breath     Spondylosis      Past Surgical History:   Procedure Laterality Date    HX BREAST REDUCTION      30 yrs ago    HX CHOLECYSTECTOMY      HX CYST REMOVAL      IR CHOLECYSTOSTOMY PERCUTANEOUS       Social History     Socioeconomic History    Marital status:    Tobacco Use    Smoking status: Never Smoker    Smokeless tobacco: Never Used   Vaping Use    Vaping Use: Never used   Substance and Sexual Activity    Alcohol use: Never    Drug use: Never     Family History   Family history unknown: Yes     Current Outpatient Medications   Medication Sig Dispense Refill    dicyclomine (BENTYL) 10 mg capsule Take 10 mg by mouth three (3) times daily.  metroNIDAZOLE (FLAGYL) 500 mg tablet Take  by mouth three (3) times daily.  cetirizine (ZYRTEC) 10 mg tablet Take 1 tablet by mouth once daily 90 Tablet 1    omeprazole (PRILOSEC) 40 mg capsule Take 1 capsule by mouth once daily 90 Capsule 1    potassium chloride (KLOR-CON M10) 10 mEq tablet Take 1 Tablet by mouth daily. 90 Tablet 0    allopurinoL (ZYLOPRIM) 300 mg tablet Take 1 Tablet by mouth daily. 90 Tablet 1    acetaminophen (Tylenol 8 Hour) 650 mg TbER Take 1 Tablet by mouth two (2) times daily as needed for Pain.  180 Tablet 0    pravastatin (PRAVACHOL) 40 mg tablet Take 1 tablet by mouth once daily 90 Tablet 0    losartan-hydroCHLOROthiazide (HYZAAR) 100-25 mg per tablet Take 1 tablet by mouth once daily for 90 days 90 Tablet 2    albuterol (PROVENTIL HFA, VENTOLIN HFA, PROAIR HFA) 90 mcg/actuation inhaler Take 2 Puffs by inhalation every six (6) hours as needed for Wheezing. 1 Inhaler 3    albuterol (ACCUNEB) 0.63 mg/3 mL nebulizer solution 0.63 mg by Nebulization route every six (6) hours as needed for Wheezing.  aspirin 81 mg chewable tablet Take 81 mg by mouth daily.  budesonide-formoteroL (Symbicort) 80-4.5 mcg/actuation HFAA Take 2 Puffs by inhalation two (2) times a day.  calcium carbonate (TUMS) 200 mg calcium (500 mg) chew Take 1 Tab by mouth daily.  carvediloL (Coreg) 12.5 mg tablet Take  by mouth two (2) times daily (with meals).  fluticasone propionate (Flonase Allergy Relief) 50 mcg/actuation nasal spray 2 Sprays by Both Nostrils route daily.  gabapentin (NEURONTIN) 100 mg capsule Take  by mouth three (3) times daily.  isosorbide mononitrate ER (IMDUR) 30 mg tablet Take 30 mg by mouth daily.  multivitamin (ONE A DAY) tablet Take 1 Tab by mouth daily.  nitroglycerin (Nitrostat) 0.3 mg SL tablet 0.3 mg by SubLINGual route every five (5) minutes as needed for Chest Pain.  psyllium (METAMUCIL) powd Take 525 mg by mouth daily. Allergies   Allergen Reactions    Clindamycin Seizures       Objective:  Visit Vitals  /64 (BP 1 Location: Right upper arm, BP Cuff Size: Large adult)   Pulse 72   Temp (!) 96 °F (35.6 °C) (Oral)   Resp 18   Ht 5' 1\" (1.549 m)   Wt 227 lb 9.6 oz (103.2 kg)   SpO2 95% Comment: RA   BMI 43.00 kg/m²       Physical Exam:   Constitutional: General Appearance: Morbid obese with pleasant personality diabetic foot exam is normal. Level of Distress: NAD. Psychiatric: Mental Status: normal mood and affect Orientation: to time, place, and person. ,normal eye contact. Head: Head: normocephalic and atraumatic. Eyes: Pupils: PERRLA. Sclerae: non-icteric. Neck: Neck: supple, trachea midline, and no masses. Lymph Nodes: no cervical LAD. Thyroid: no enlargement or nodules and non-tender. Lungs: Respiratory effort: no dyspnea. Auscultation: no wheezing, rales/crackles, or rhonchi and breath sounds normal and good air movement. Cardiovascular: Apical Impulse: not displaced. Heart Auscultation: normal S1 and S2; no murmurs, rubs, or gallops; and RRR. Neck vessels: no carotid bruits. Pulses including femoral / pedal: normal throughout. Abdomen: Bowel Sounds: normal. Inspection and Palpation: no tenderness, guarding, or masses and soft and non-distended. Liver: non-tender and no hepatomegaly. Spleen: non-tender and no splenomegaly. Musculoskeletal[de-identified] Extremities: no edema,no varicosities. No Calf tenderness. Neurologic: Gait and Station: normal gait and station. Motor Strength normal right and left. Skin: Inspection and palpation: no rash, lesions, or ulcer. Diabetic foot exam is normal monofilament test negative dorsalis pedis palpable in both feet. Results for orders placed or performed in visit on 12/15/21   CBC WITH AUTOMATED DIFF   Result Value Ref Range    WBC 10.1 3.4 - 10.8 x10E3/uL    RBC 5.28 3.77 - 5.28 x10E6/uL    HGB 13.0 11.1 - 15.9 g/dL    HCT 40.8 34.0 - 46.6 %    MCV 77 (L) 79 - 97 fL    MCH 24.6 (L) 26.6 - 33.0 pg    MCHC 31.9 31.5 - 35.7 g/dL    RDW 14.7 11.7 - 15.4 %    PLATELET 992 180 - 310 x10E3/uL    NEUTROPHILS 59 Not Estab. %    Lymphocytes 27 Not Estab. %    MONOCYTES 9 Not Estab. %    EOSINOPHILS 4 Not Estab. %    BASOPHILS 1 Not Estab. %    ABS. NEUTROPHILS 6.0 1.4 - 7.0 x10E3/uL    Abs Lymphocytes 2.7 0.7 - 3.1 x10E3/uL    ABS. MONOCYTES 0.9 0.1 - 0.9 x10E3/uL    ABS. EOSINOPHILS 0.4 0.0 - 0.4 x10E3/uL    ABS. BASOPHILS 0.1 0.0 - 0.2 x10E3/uL    IMMATURE GRANULOCYTES 0 Not Estab. %    ABS. IMM.  GRANS. 0.0 0.0 - 0.1 I65V8/UO   METABOLIC PANEL, COMPREHENSIVE   Result Value Ref Range    Glucose 84 65 - 99 mg/dL    BUN 19 8 - 27 mg/dL    Creatinine 1.21 (H) 0.57 - 1.00 mg/dL    GFR est non-AA 42 (L) >59 mL/min/1.73    GFR est AA 48 (L) >59 mL/min/1.73    BUN/Creatinine ratio 16 12 - 28    Sodium 148 (H) 134 - 144 mmol/L    Potassium 3.8 3.5 - 5.2 mmol/L    Chloride 104 96 - 106 mmol/L    CO2 29 20 - 29 mmol/L    Calcium 9.7 8.7 - 10.3 mg/dL    Protein, total 7.0 6.0 - 8.5 g/dL    Albumin 4.3 3.6 - 4.6 g/dL    GLOBULIN, TOTAL 2.7 1.5 - 4.5 g/dL    A-G Ratio 1.6 1.2 - 2.2    Bilirubin, total 0.3 0.0 - 1.2 mg/dL    Alk. phosphatase 49 44 - 121 IU/L    AST (SGOT) 16 0 - 40 IU/L    ALT (SGPT) 13 0 - 32 IU/L   LIPID PANEL   Result Value Ref Range    Cholesterol, total 171 100 - 199 mg/dL    Triglyceride 157 (H) 0 - 149 mg/dL    HDL Cholesterol 50 >39 mg/dL    VLDL, calculated 27 5 - 40 mg/dL    LDL, calculated 94 0 - 99 mg/dL   HEMOGLOBIN A1C WITH EAG   Result Value Ref Range    Hemoglobin A1c 6.6 (H) 4.8 - 5.6 %    Estimated average glucose 143 mg/dL   URIC ACID   Result Value Ref Range    Uric acid 4.7 3.1 - 7.9 mg/dL   AMB POC GLUCOSE BLOOD, BY GLUCOSE MONITORING DEVICE   Result Value Ref Range    Glucose POC 93 MG/DL       Assessment/Plan:      ICD-10-CM ICD-9-CM    1. Essential hypertension  I10 401.9    2. Controlled type 2 diabetes mellitus without complication, without long-term current use of insulin (HCC)  E11.9 250.00  DIABETES FOOT EXAM      METABOLIC PANEL, BASIC      HEMOGLOBIN A1C WITH EAG   3. Pure hypercholesterolemia  E78.00 272.0    4. Mild intermittent asthma without complication  O19.57 513.52    5. Chronic gout without tophus, unspecified cause, unspecified site  M1A. 9XX0 274.02    6. Gastroesophageal reflux disease without esophagitis  K21.9 530.81    7. Osteoarthritis of multiple joints, unspecified osteoarthritis type  M15.9 715.89    8. Allergic rhinitis, unspecified seasonality, unspecified trigger  J30.9 477.9    9. Morbid obesity ([facility]  E66.01 278.01    10.  Venous insufficiency of both lower extremities  I87.2 459.81    11. Lumbar radiculopathy  M54.16 724.4    12. History of sarcoidosis  Z86.2 V12.29    13. Menopausal and postmenopausal disorder  N95.9 627.9    14. Ischemic heart disease  I25.9 414.9    15. Need for hepatitis C screening test  Z11.59 V73.89    16. Encounter for osteoporosis screening in asymptomatic postmenopausal patient  Z13.820 V82.81 DEXA BONE DENSITY STUDY AXIAL    Z78.0 V49.81      Orders Placed This Encounter    DEXA BONE DENSITY STUDY AXIAL     Standing Status:   Future     Standing Expiration Date:   1/3/5662    METABOLIC PANEL, BASIC    HEMOGLOBIN A1C WITH EAG     DIABETES FOOT EXAM    dicyclomine (BENTYL) 10 mg capsule     Sig: Take 10 mg by mouth three (3) times daily.  metroNIDAZOLE (FLAGYL) 500 mg tablet     Sig: Take  by mouth three (3) times daily. Hypertension controlled recently she had potassium 3.4 when she checked her BMP with gastroenterologist on 22nd March repeat BMP ordered she takes losartan hydrochlorothiazide 100/25 mg/day if needed I will decrease hydrochlorothiazide to 12.5 mg/day continue carvedilol 12.5 mg twice a day. Diet low in sodium. Type 2 diabetes mellitus hemoglobin A1c improved to 6.7% due to abdominal discomfort I told her to hold Metformin she has consulted gastroenterologist who has started her on metronidazole that she finished and she has been given dicyclomine recommended that we repeat the labs and she will hold Metformin after reviewing her hemoglobin A1c I will start her medicine if needed recommended to take diet low in starch sugar continue low-dose aspirin continue statin. She follows ophthalmologist labs ordered. Allergic rhinitis continue fluticasone nasal spray and cetirizine. History of mild intermittent uncomplicated asthma with  history of sarcoidosis but no flareup continue rescue inhaler albuterol inhaler. She is not smoking cigarette. GERD taking omeprazole.     Gout stable no flareup diet low in purine continue allopurinol. 300 mg/day. She also takes generic brand of Symbicort inhaler      History of ischemic heart disease follows Dr. Raj Maguire who had started her on isosorbide mononitrate ER 30 mg once a day for last several years so I will not stop it currently no chest pain. Hypercholesteremia taking pravastatin 40 mg at bedtime. Diet low in fat in the past she had elevated triglyceride recommended to avoid fried food and unsaturated unhealthy snacks and meal    Abdominal discomfort be evaluated by gastroenterologist Dr. Byron Vickers and stool test was negative she finished metronidazole she takes dicyclomine recommended to hold Metformin until hemoglobin A1c results come back and diet low in starch and sugar. Constipation taking Metamucil    DJD of joints with lumbar radiculopathy takes gabapentin 100 mg 3 times a day. Obesity lifestyle modification she should try essentially to lose weight labs ordered. Follow-up in 3 months. Bone density ordered. lose weight, increase physical activity, follow low fat diet, follow low salt diet, continue present plan, routine labs ordered, call if any problems    There are no Patient Instructions on file for this visit. Follow-up and Dispositions    · Return in about 3 months (around 7/7/2022) for follow up for chronic condition.

## 2022-04-07 NOTE — PROGRESS NOTES
1. \"Have you been to the ER, urgent care clinic since your last visit? Hospitalized since your last visit? \" No    2. \"Have you seen or consulted any other health care providers outside of the [address] [place] [address] since your last visit? \" Yes When: 2 weeks ago Where: Gastrointerologist was seen by Dr. Alexis Felix Reason for visit: Abdominal pain and loose stools      3. For patients aged 39-70: Has the patient had a colonoscopy / FIT/ Cologuard? NA - based on age      If the patient is female:    4. For patients aged 41-77: Has the patient had a mammogram within the past 2 years? NA - based on age or sex      11. For patients aged 21-65: Has the patient had a pap smear?  NA - based on age or sex     Chief Complaint   Patient presents with    Follow Up Chronic Condition    GERD    Diabetes    Cholesterol Problem     Visit Vitals  BP (!) 149/73 (BP 1 Location: Left upper arm, BP Patient Position: Sitting, BP Cuff Size: Adult)   Pulse 73   Temp (!) 96 °F (35.6 °C) (Oral)   Resp 18   Ht 5' 1\" (1.549 m)   Wt 227 lb 9.6 oz (103.2 kg)   SpO2 95% Comment: RA   BMI 43.00 kg/m²

## 2022-04-08 LAB
BUN SERPL-MCNC: 16 MG/DL (ref 8–27)
BUN/CREAT SERPL: 15 (ref 12–28)
CALCIUM SERPL-MCNC: 9.5 MG/DL (ref 8.7–10.3)
CHLORIDE SERPL-SCNC: 97 MMOL/L (ref 96–106)
CO2 SERPL-SCNC: 26 MMOL/L (ref 20–29)
CREAT SERPL-MCNC: 1.05 MG/DL (ref 0.57–1)
EGFR: 53 ML/MIN/1.73
EST. AVERAGE GLUCOSE BLD GHB EST-MCNC: 134 MG/DL
GLUCOSE SERPL-MCNC: 107 MG/DL (ref 65–99)
HBA1C MFR BLD: 6.3 % (ref 4.8–5.6)
POTASSIUM SERPL-SCNC: 3.8 MMOL/L (ref 3.5–5.2)
SODIUM SERPL-SCNC: 142 MMOL/L (ref 134–144)

## 2022-04-08 RX ORDER — GLIPIZIDE 5 MG/1
2.5 TABLET ORAL 2 TIMES DAILY
Qty: 45 TABLET | Refills: 0 | Status: SHIPPED | OUTPATIENT
Start: 2022-04-08 | End: 2022-04-11 | Stop reason: SDUPTHER

## 2022-04-08 RX ORDER — OMEPRAZOLE 40 MG/1
40 CAPSULE, DELAYED RELEASE ORAL DAILY
Qty: 90 CAPSULE | Refills: 1 | Status: SHIPPED | OUTPATIENT
Start: 2022-04-08

## 2022-04-08 NOTE — PROGRESS NOTES
Please call MsPetar  Adelina Blanton that please hold Metformin her diabetes has decreased we can give glipizide 2.5 mg once a day in the morning and her potassium is normal I will start glipizide 2.5 mg in the morning 20 minutes before eating and stop Metformin and let us see how it affects her stomach discomfort that she consulted gastroenterologist and follow the instructions given by gastroenterologist

## 2022-04-11 ENCOUNTER — TELEPHONE (OUTPATIENT)
Dept: INTERNAL MEDICINE CLINIC | Age: 82
End: 2022-04-11

## 2022-04-11 PROBLEM — Z11.59 NEED FOR HEPATITIS C SCREENING TEST: Status: ACTIVE | Noted: 2022-04-06

## 2022-04-11 PROBLEM — J01.90 ACUTE NON-RECURRENT SINUSITIS, UNSPECIFIED LOCATION: Status: RESOLVED | Noted: 2021-03-12 | Resolved: 2022-04-06

## 2022-04-11 PROBLEM — D86.9 SARCOIDOSIS: Status: RESOLVED | Noted: 2021-01-06 | Resolved: 2022-04-06

## 2022-04-11 RX ORDER — GLIPIZIDE 5 MG/1
2.5 TABLET ORAL
Qty: 45 TABLET | Refills: 0 | Status: SHIPPED | OUTPATIENT
Start: 2022-04-11 | End: 2022-07-09

## 2022-04-11 NOTE — TELEPHONE ENCOUNTER
The pharmacy needs clarification on this medication    glipiZIDE (GLUCOTROL) 5 mg tablet       The directions are confusing

## 2022-05-06 PROBLEM — Z11.59 NEED FOR HEPATITIS C SCREENING TEST: Status: RESOLVED | Noted: 2022-04-06 | Resolved: 2022-05-06

## 2022-05-16 NOTE — TELEPHONE ENCOUNTER
[address] faxed refill request for    Pravastatin 40mg tabs  Take 1 tab by mouth once daily    LOV 4/7/22  NOV 7/19/22

## 2022-05-18 RX ORDER — POTASSIUM CHLORIDE 750 MG/1
10 TABLET, EXTENDED RELEASE ORAL DAILY
Qty: 90 TABLET | Refills: 1 | Status: SHIPPED | OUTPATIENT
Start: 2022-05-18

## 2022-05-18 RX ORDER — PRAVASTATIN SODIUM 40 MG/1
40 TABLET ORAL
Qty: 90 TABLET | Refills: 1 | Status: SHIPPED | OUTPATIENT
Start: 2022-05-18

## 2022-06-28 ENCOUNTER — TRANSCRIBE ORDER (OUTPATIENT)
Dept: SCHEDULING | Age: 82
End: 2022-06-28

## 2022-06-28 DIAGNOSIS — Z12.31 VISIT FOR SCREENING MAMMOGRAM: Primary | ICD-10-CM

## 2022-07-09 ENCOUNTER — APPOINTMENT (OUTPATIENT)
Dept: CT IMAGING | Age: 82
DRG: 149 | End: 2022-07-09
Attending: EMERGENCY MEDICINE
Payer: MEDICARE

## 2022-07-09 ENCOUNTER — HOSPITAL ENCOUNTER (INPATIENT)
Age: 82
LOS: 1 days | Discharge: HOME | DRG: 149 | End: 2022-07-10
Attending: EMERGENCY MEDICINE | Admitting: INTERNAL MEDICINE
Payer: MEDICARE

## 2022-07-09 ENCOUNTER — APPOINTMENT (OUTPATIENT)
Dept: GENERAL RADIOLOGY | Age: 82
DRG: 149 | End: 2022-07-09
Attending: EMERGENCY MEDICINE
Payer: MEDICARE

## 2022-07-09 DIAGNOSIS — R42 DIZZINESS: Primary | ICD-10-CM

## 2022-07-09 DIAGNOSIS — R26.81 UNSTABLE GAIT: ICD-10-CM

## 2022-07-09 LAB
ALBUMIN SERPL-MCNC: 3.8 G/DL (ref 3.5–5)
ALBUMIN/GLOB SERPL: 1.3 {RATIO} (ref 1.1–2.2)
ALP SERPL-CCNC: 44 U/L (ref 45–117)
ALT SERPL-CCNC: 21 U/L (ref 12–78)
ANION GAP SERPL CALC-SCNC: 6 MMOL/L (ref 5–15)
AST SERPL W P-5'-P-CCNC: 20 U/L (ref 15–37)
BASOPHILS # BLD: 0 K/UL (ref 0–0.1)
BASOPHILS NFR BLD: 0 % (ref 0–1)
BILIRUB SERPL-MCNC: 0.3 MG/DL (ref 0.2–1)
BUN SERPL-MCNC: 18 MG/DL (ref 6–20)
BUN/CREAT SERPL: 16 (ref 12–20)
CA-I BLD-MCNC: 8.9 MG/DL (ref 8.5–10.1)
CHLORIDE SERPL-SCNC: 102 MMOL/L (ref 97–108)
CO2 SERPL-SCNC: 32 MMOL/L (ref 21–32)
CREAT SERPL-MCNC: 1.14 MG/DL (ref 0.55–1.02)
DIFFERENTIAL METHOD BLD: ABNORMAL
EOSINOPHIL # BLD: 0.4 K/UL (ref 0–0.4)
EOSINOPHIL NFR BLD: 5 % (ref 0–7)
ERYTHROCYTE [DISTWIDTH] IN BLOOD BY AUTOMATED COUNT: 15 % (ref 11.5–14.5)
GLOBULIN SER CALC-MCNC: 3 G/DL (ref 2–4)
GLUCOSE SERPL-MCNC: 110 MG/DL (ref 65–100)
HCT VFR BLD AUTO: 39.4 % (ref 35–47)
HGB BLD-MCNC: 12.7 G/DL (ref 11.5–16)
IMM GRANULOCYTES # BLD AUTO: 0 K/UL (ref 0–0.04)
IMM GRANULOCYTES NFR BLD AUTO: 0 % (ref 0–0.5)
LYMPHOCYTES # BLD: 2.4 K/UL (ref 0.8–3.5)
LYMPHOCYTES NFR BLD: 30 % (ref 12–49)
MCH RBC QN AUTO: 25 PG (ref 26–34)
MCHC RBC AUTO-ENTMCNC: 32.2 G/DL (ref 30–36.5)
MCV RBC AUTO: 77.6 FL (ref 80–99)
MONOCYTES # BLD: 0.7 K/UL (ref 0–1)
MONOCYTES NFR BLD: 8 % (ref 5–13)
NEUTS SEG # BLD: 4.7 K/UL (ref 1.8–8)
NEUTS SEG NFR BLD: 57 % (ref 32–75)
PLATELET # BLD AUTO: 269 K/UL (ref 150–400)
PMV BLD AUTO: 10.3 FL (ref 8.9–12.9)
POTASSIUM SERPL-SCNC: 3.4 MMOL/L (ref 3.5–5.1)
PROT SERPL-MCNC: 6.8 G/DL (ref 6.4–8.2)
RBC # BLD AUTO: 5.08 M/UL (ref 3.8–5.2)
SODIUM SERPL-SCNC: 140 MMOL/L (ref 136–145)
TROPONIN-HIGH SENSITIVITY: 25 NG/L (ref 0–51)
TROPONIN-HIGH SENSITIVITY: 27 NG/L (ref 0–51)
WBC # BLD AUTO: 8.2 K/UL (ref 3.6–11)

## 2022-07-09 PROCEDURE — 99285 EMERGENCY DEPT VISIT HI MDM: CPT

## 2022-07-09 PROCEDURE — 74011250636 HC RX REV CODE- 250/636: Performed by: EMERGENCY MEDICINE

## 2022-07-09 PROCEDURE — 70450 CT HEAD/BRAIN W/O DYE: CPT

## 2022-07-09 PROCEDURE — 65270000029 HC RM PRIVATE

## 2022-07-09 PROCEDURE — 80053 COMPREHEN METABOLIC PANEL: CPT

## 2022-07-09 PROCEDURE — 71045 X-RAY EXAM CHEST 1 VIEW: CPT

## 2022-07-09 PROCEDURE — 36415 COLL VENOUS BLD VENIPUNCTURE: CPT

## 2022-07-09 PROCEDURE — 93005 ELECTROCARDIOGRAM TRACING: CPT

## 2022-07-09 PROCEDURE — 84484 ASSAY OF TROPONIN QUANT: CPT

## 2022-07-09 PROCEDURE — 74011250637 HC RX REV CODE- 250/637: Performed by: EMERGENCY MEDICINE

## 2022-07-09 PROCEDURE — 85025 COMPLETE CBC W/AUTO DIFF WBC: CPT

## 2022-07-09 RX ORDER — MESALAMINE 400 MG/1
400 CAPSULE, DELAYED RELEASE ORAL 3 TIMES DAILY
Status: DISCONTINUED | OUTPATIENT
Start: 2022-07-09 | End: 2022-07-10 | Stop reason: HOSPADM

## 2022-07-09 RX ORDER — POTASSIUM CHLORIDE 750 MG/1
40 TABLET, FILM COATED, EXTENDED RELEASE ORAL
Status: COMPLETED | OUTPATIENT
Start: 2022-07-09 | End: 2022-07-09

## 2022-07-09 RX ORDER — PANTOPRAZOLE SODIUM 40 MG/1
40 TABLET, DELAYED RELEASE ORAL
Status: DISCONTINUED | OUTPATIENT
Start: 2022-07-10 | End: 2022-07-10 | Stop reason: HOSPADM

## 2022-07-09 RX ORDER — CARVEDILOL 12.5 MG/1
12.5 TABLET ORAL 2 TIMES DAILY WITH MEALS
Status: DISCONTINUED | OUTPATIENT
Start: 2022-07-10 | End: 2022-07-10 | Stop reason: HOSPADM

## 2022-07-09 RX ORDER — ALLOPURINOL 300 MG/1
300 TABLET ORAL DAILY
Status: DISCONTINUED | OUTPATIENT
Start: 2022-07-10 | End: 2022-07-10 | Stop reason: HOSPADM

## 2022-07-09 RX ORDER — FLUTICASONE PROPIONATE 50 MCG
2 SPRAY, SUSPENSION (ML) NASAL DAILY
Status: DISCONTINUED | OUTPATIENT
Start: 2022-07-10 | End: 2022-07-10 | Stop reason: HOSPADM

## 2022-07-09 RX ORDER — METFORMIN HYDROCHLORIDE 500 MG/1
500 TABLET ORAL 2 TIMES DAILY WITH MEALS
COMMUNITY
End: 2022-07-19

## 2022-07-09 RX ORDER — ATORVASTATIN CALCIUM 10 MG/1
10 TABLET, FILM COATED ORAL DAILY
Status: DISCONTINUED | OUTPATIENT
Start: 2022-07-10 | End: 2022-07-10 | Stop reason: HOSPADM

## 2022-07-09 RX ORDER — METFORMIN HYDROCHLORIDE 500 MG/1
500 TABLET ORAL 2 TIMES DAILY WITH MEALS
Status: DISCONTINUED | OUTPATIENT
Start: 2022-07-10 | End: 2022-07-10 | Stop reason: HOSPADM

## 2022-07-09 RX ORDER — LOSARTAN POTASSIUM 50 MG/1
100 TABLET ORAL DAILY
Status: DISCONTINUED | OUTPATIENT
Start: 2022-07-10 | End: 2022-07-10 | Stop reason: HOSPADM

## 2022-07-09 RX ORDER — MULTIVITAMIN
1 TABLET ORAL DAILY
Status: DISCONTINUED | OUTPATIENT
Start: 2022-07-10 | End: 2022-07-10 | Stop reason: HOSPADM

## 2022-07-09 RX ORDER — DEXTROSE MONOHYDRATE 100 MG/ML
0-250 INJECTION, SOLUTION INTRAVENOUS AS NEEDED
Status: DISCONTINUED | OUTPATIENT
Start: 2022-07-09 | End: 2022-07-10 | Stop reason: HOSPADM

## 2022-07-09 RX ORDER — ALBUTEROL SULFATE 90 UG/1
2 AEROSOL, METERED RESPIRATORY (INHALATION)
Status: DISCONTINUED | OUTPATIENT
Start: 2022-07-09 | End: 2022-07-10 | Stop reason: HOSPADM

## 2022-07-09 RX ORDER — DOCUSATE SODIUM 100 MG/1
100 CAPSULE, LIQUID FILLED ORAL 2 TIMES DAILY
Status: DISCONTINUED | OUTPATIENT
Start: 2022-07-09 | End: 2022-07-10 | Stop reason: HOSPADM

## 2022-07-09 RX ORDER — MECLIZINE HCL 12.5 MG 12.5 MG/1
25 TABLET ORAL
Status: COMPLETED | OUTPATIENT
Start: 2022-07-09 | End: 2022-07-09

## 2022-07-09 RX ORDER — POTASSIUM CHLORIDE 750 MG/1
10 TABLET, EXTENDED RELEASE ORAL DAILY
Status: DISCONTINUED | OUTPATIENT
Start: 2022-07-10 | End: 2022-07-10 | Stop reason: HOSPADM

## 2022-07-09 RX ORDER — BUTALBITAL, ACETAMINOPHEN AND CAFFEINE 50; 325; 40 MG/1; MG/1; MG/1
1 TABLET ORAL
Status: COMPLETED | OUTPATIENT
Start: 2022-07-09 | End: 2022-07-09

## 2022-07-09 RX ORDER — ISOSORBIDE MONONITRATE 30 MG/1
30 TABLET, EXTENDED RELEASE ORAL DAILY
Status: DISCONTINUED | OUTPATIENT
Start: 2022-07-10 | End: 2022-07-10 | Stop reason: HOSPADM

## 2022-07-09 RX ORDER — NITROGLYCERIN 0.4 MG/1
0.4 TABLET SUBLINGUAL
Status: DISCONTINUED | OUTPATIENT
Start: 2022-07-09 | End: 2022-07-10 | Stop reason: HOSPADM

## 2022-07-09 RX ORDER — GABAPENTIN 100 MG/1
100 CAPSULE ORAL 3 TIMES DAILY
Status: DISCONTINUED | OUTPATIENT
Start: 2022-07-09 | End: 2022-07-10 | Stop reason: HOSPADM

## 2022-07-09 RX ORDER — HYDROCHLOROTHIAZIDE 25 MG/1
25 TABLET ORAL DAILY
Status: DISCONTINUED | OUTPATIENT
Start: 2022-07-10 | End: 2022-07-10 | Stop reason: HOSPADM

## 2022-07-09 RX ORDER — SODIUM CHLORIDE 0.9 % (FLUSH) 0.9 %
5-40 SYRINGE (ML) INJECTION EVERY 8 HOURS
Status: DISCONTINUED | OUTPATIENT
Start: 2022-07-09 | End: 2022-07-10 | Stop reason: HOSPADM

## 2022-07-09 RX ORDER — BUDESONIDE AND FORMOTEROL FUMARATE DIHYDRATE 80; 4.5 UG/1; UG/1
2 AEROSOL RESPIRATORY (INHALATION) 2 TIMES DAILY
Status: DISCONTINUED | OUTPATIENT
Start: 2022-07-09 | End: 2022-07-10 | Stop reason: HOSPADM

## 2022-07-09 RX ORDER — MAGNESIUM SULFATE 100 %
4 CRYSTALS MISCELLANEOUS AS NEEDED
Status: DISCONTINUED | OUTPATIENT
Start: 2022-07-09 | End: 2022-07-10 | Stop reason: HOSPADM

## 2022-07-09 RX ORDER — CALCIUM CARBONATE 200(500)MG
200 TABLET,CHEWABLE ORAL DAILY
Status: DISCONTINUED | OUTPATIENT
Start: 2022-07-10 | End: 2022-07-10 | Stop reason: HOSPADM

## 2022-07-09 RX ORDER — GUAIFENESIN 100 MG/5ML
81 LIQUID (ML) ORAL DAILY
Status: DISCONTINUED | OUTPATIENT
Start: 2022-07-10 | End: 2022-07-10 | Stop reason: HOSPADM

## 2022-07-09 RX ORDER — SODIUM CHLORIDE 0.9 % (FLUSH) 0.9 %
5-40 SYRINGE (ML) INJECTION AS NEEDED
Status: DISCONTINUED | OUTPATIENT
Start: 2022-07-09 | End: 2022-07-10 | Stop reason: HOSPADM

## 2022-07-09 RX ORDER — LOSARTAN POTASSIUM AND HYDROCHLOROTHIAZIDE 12.5; 5 MG/1; MG/1
2 TABLET ORAL DAILY
Status: DISCONTINUED | OUTPATIENT
Start: 2022-07-10 | End: 2022-07-09

## 2022-07-09 RX ORDER — ACETAMINOPHEN 325 MG/1
650 TABLET ORAL ONCE
Status: COMPLETED | OUTPATIENT
Start: 2022-07-09 | End: 2022-07-09

## 2022-07-09 RX ORDER — INSULIN LISPRO 100 [IU]/ML
INJECTION, SOLUTION INTRAVENOUS; SUBCUTANEOUS
Status: DISCONTINUED | OUTPATIENT
Start: 2022-07-10 | End: 2022-07-10 | Stop reason: HOSPADM

## 2022-07-09 RX ORDER — ACETAMINOPHEN 325 MG/1
650 TABLET ORAL
Status: DISCONTINUED | OUTPATIENT
Start: 2022-07-09 | End: 2022-07-10 | Stop reason: HOSPADM

## 2022-07-09 RX ORDER — PRAVASTATIN SODIUM 40 MG/1
40 TABLET ORAL
Status: DISCONTINUED | OUTPATIENT
Start: 2022-07-09 | End: 2022-07-09

## 2022-07-09 RX ORDER — OMEPRAZOLE 40 MG/1
40 CAPSULE, DELAYED RELEASE ORAL DAILY
Status: DISCONTINUED | OUTPATIENT
Start: 2022-07-10 | End: 2022-07-09

## 2022-07-09 RX ADMIN — BUTALBITAL, ACETAMINOPHEN, AND CAFFEINE 1 TABLET: 50; 325; 40 TABLET ORAL at 18:15

## 2022-07-09 RX ADMIN — MECLIZINE 25 MG: 12.5 TABLET ORAL at 10:24

## 2022-07-09 RX ADMIN — POTASSIUM CHLORIDE 40 MEQ: 750 TABLET, FILM COATED, EXTENDED RELEASE ORAL at 20:38

## 2022-07-09 RX ADMIN — ACETAMINOPHEN 650 MG: 325 TABLET ORAL at 10:23

## 2022-07-09 RX ADMIN — BUTALBITAL, ACETAMINOPHEN, AND CAFFEINE 1 TABLET: 50; 325; 40 TABLET ORAL at 11:51

## 2022-07-09 NOTE — ED TRIAGE NOTES
Started last night 1900, dizzy gets better when she closes eyes, hx of vertigo, dizziness is constant, No n, V, diarrhea, no CP, sob, or sweating,

## 2022-07-09 NOTE — ED NOTES
Went to walk pt and she stated she is as dizzy now as she was prior to coming to ER. Pt stated she is dizzy sitting or standing.

## 2022-07-09 NOTE — ED PROVIDER NOTES
EMERGENCY DEPARTMENT HISTORY AND PHYSICAL EXAM      Date: 7/9/2022  Patient Name: Guillaume Ojeda    History of Presenting Illness     Chief Complaint   Patient presents with    Dizziness       History Provided By: Patient    HPI: Guillaume Ojeda, 80 y.o. female with a past medical history significant for multiple medical problems including diabetes, hypertension, hyperlipidemia, myocardial infarction and osteoarthritis and vertigo many years ago presents to the ED with cc of acute onset of headache from midface upward and associated dizziness yesterday evening. States she felt unsteady on her feet and when she went to bed the room was spinning. No head injury or visual change. No focal weakness or numbness. States difficulty ambulating due to ongoing dizziness today. Headache recurrent today. No recent illness,  No URI sx, n/v/d/c, melena or hematochezia. Reports nl po intake. PCP: Sandy Vance MD    No current facility-administered medications on file prior to encounter. Current Outpatient Medications on File Prior to Encounter   Medication Sig Dispense Refill    allopurinoL (ZYLOPRIM) 300 mg tablet Take 1 tablet by mouth once daily 90 Tablet 2    carvediloL (Coreg) 12.5 mg tablet Take 1 Tablet by mouth two (2) times daily (with meals). 180 Tablet 1    potassium chloride (KLOR-CON M10) 10 mEq tablet Take 1 Tablet by mouth daily. 90 Tablet 1    pravastatin (PRAVACHOL) 40 mg tablet Take 1 Tablet by mouth nightly. 90 Tablet 1    potassium chloride (KLOR-CON M10) 10 mEq tablet Take 1 Tablet by mouth daily. 90 Tablet 1    Calcium Carbonate-Vit D3-Min 600 mg calcium- 200 unit tab Take  by mouth.  colestipoL (COLESTID) 1 gram tablet Take 1 g by mouth two (2) times a day.  mesalamine (DELZICOL) 400 mg cdti capsule Take 400 mg by mouth three (3) times daily.  glipiZIDE (GLUCOTROL) 5 mg tablet Take 0.5 Tablets by mouth Daily (before breakfast).  Take half tablet 20 minutes before breakfast once a day please stop Metformin, 45 Tablet 0    omeprazole (PRILOSEC) 40 mg capsule Take 1 Capsule by mouth daily. 90 Capsule 1    dicyclomine (BENTYL) 10 mg capsule Take 10 mg by mouth three (3) times daily.  metroNIDAZOLE (FLAGYL) 500 mg tablet Take  by mouth three (3) times daily.  cetirizine (ZYRTEC) 10 mg tablet Take 1 tablet by mouth once daily 90 Tablet 1    acetaminophen (Tylenol 8 Hour) 650 mg TbER Take 1 Tablet by mouth two (2) times daily as needed for Pain. 180 Tablet 0    losartan-hydroCHLOROthiazide (HYZAAR) 100-25 mg per tablet Take 1 tablet by mouth once daily for 90 days 90 Tablet 2    albuterol (PROVENTIL HFA, VENTOLIN HFA, PROAIR HFA) 90 mcg/actuation inhaler Take 2 Puffs by inhalation every six (6) hours as needed for Wheezing. 1 Inhaler 3    albuterol (ACCUNEB) 0.63 mg/3 mL nebulizer solution 0.63 mg by Nebulization route every six (6) hours as needed for Wheezing.  aspirin 81 mg chewable tablet Take 81 mg by mouth daily.  budesonide-formoteroL (Symbicort) 80-4.5 mcg/actuation HFAA Take 2 Puffs by inhalation two (2) times a day.  calcium carbonate (TUMS) 200 mg calcium (500 mg) chew Take 1 Tab by mouth daily.  fluticasone propionate (Flonase Allergy Relief) 50 mcg/actuation nasal spray 2 Sprays by Both Nostrils route daily.  gabapentin (NEURONTIN) 100 mg capsule Take  by mouth three (3) times daily.  isosorbide mononitrate ER (IMDUR) 30 mg tablet Take 30 mg by mouth daily.  multivitamin (ONE A DAY) tablet Take 1 Tab by mouth daily.  nitroglycerin (Nitrostat) 0.3 mg SL tablet 0.3 mg by SubLINGual route every five (5) minutes as needed for Chest Pain.  psyllium (METAMUCIL) powd Take 525 mg by mouth daily.          Past History     Past Medical History:  Past Medical History:   Diagnosis Date    Allergic rhinitis     Angina pectoris (HCC)     Arthritis     Cardiac murmur     Chronic ischemic heart disease     Diabetes ([facility] [address]     Diverticulitis     Edema of left lower leg     GERD (gastroesophageal reflux disease)     H/O Clostridium difficile infection     Hyperlipidemia     Hypertension     Knee pain     Left tibialis posterior tendinitis     Lumbar radiculopathy     Morbid obesity (HCC)     Osteoarthritis     Sarcoidosis     Shortness of breath     Spondylosis        Past Surgical History:  Past Surgical History:   Procedure Laterality Date    HX BREAST REDUCTION      30 yrs ago    HX CHOLECYSTECTOMY      HX CYST REMOVAL      HX GI  2012    hx of bowel resection from diverticulitits    IR CHOLECYSTOSTOMY PERCUTANEOUS         Family History:  Family History   Family history unknown: Yes       Social History:  Social History     Tobacco Use    Smoking status: Never Smoker    Smokeless tobacco: Never Used   Vaping Use    Vaping Use: Never used   Substance Use Topics    Alcohol use: Never    Drug use: Never       Allergies: Allergies   Allergen Reactions    Clindamycin Seizures         Review of Systems   Review of Systems   Constitutional: Negative for fever. HENT: Negative for ear pain, sore throat, trouble swallowing and voice change. Eyes: Negative for photophobia, pain and visual disturbance. Respiratory: Negative for shortness of breath. Cardiovascular: Negative for chest pain. Neurological: Negative for speech difficulty, weakness and numbness. Physical Exam   Physical Exam  Vitals and nursing note reviewed. Constitutional:       Appearance: Normal appearance. She is not ill-appearing. HENT:      Head: Normocephalic and atraumatic. Nose: Nose normal.      Mouth/Throat:      Mouth: Mucous membranes are moist.      Pharynx: No oropharyngeal exudate or posterior oropharyngeal erythema. Eyes:      General: No scleral icterus. Extraocular Movements: Extraocular movements intact. Pupils: Pupils are equal, round, and reactive to light.    Cardiovascular:      Rate and Rhythm: Normal rate and regular rhythm. Pulses: Normal pulses. Heart sounds: Normal heart sounds. Pulmonary:      Effort: Pulmonary effort is normal.      Breath sounds: Normal breath sounds. No wheezing, rhonchi or rales. Abdominal:      General: Bowel sounds are normal.      Palpations: Abdomen is soft. Tenderness: There is no abdominal tenderness. Musculoskeletal:         General: Normal range of motion. Cervical back: Normal range of motion and neck supple. Right lower leg: No edema. Left lower leg: No edema. Skin:     General: Skin is warm and dry. Findings: No rash. Neurological:      General: No focal deficit present. Mental Status: She is alert and oriented to person, place, and time. Cranial Nerves: No cranial nerve deficit. Sensory: No sensory deficit. Motor: No weakness. Gait: Gait abnormal.      Comments: Nl gross motor/sensory exam without any focal or lateralizing deficits. Nl finger to nose but upon standing pt, she loses balance, cannot ambulate. Psychiatric:         Mood and Affect: Mood normal.         Behavior: Behavior normal.         Diagnostic Study Results     Labs -     Recent Results (from the past 12 hour(s))   CBC WITH AUTOMATED DIFF    Collection Time: 07/09/22  9:45 AM   Result Value Ref Range    WBC 8.2 3.6 - 11.0 K/uL    RBC 5.08 3.80 - 5.20 M/uL    HGB 12.7 11.5 - 16.0 g/dL    HCT 39.4 35.0 - 47.0 %    MCV 77.6 (L) 80.0 - 99.0 FL    MCH 25.0 (L) 26.0 - 34.0 PG    MCHC 32.2 30.0 - 36.5 g/dL    RDW 15.0 (H) 11.5 - 14.5 %    PLATELET 284 037 - 102 K/uL    MPV 10.3 8.9 - 12.9 FL    NEUTROPHILS 57 32 - 75 %    LYMPHOCYTES 30 12 - 49 %    MONOCYTES 8 5 - 13 %    EOSINOPHILS 5 0 - 7 %    BASOPHILS 0 0 - 1 %    IMMATURE GRANULOCYTES 0 0.0 - 0.5 %    ABS. NEUTROPHILS 4.7 1.8 - 8.0 K/UL    ABS. LYMPHOCYTES 2.4 0.8 - 3.5 K/UL    ABS. MONOCYTES 0.7 0.0 - 1.0 K/UL    ABS. EOSINOPHILS 0.4 0.0 - 0.4 K/UL    ABS.  BASOPHILS 0.0 0.0 - 0.1 K/UL    ABS. IMM. GRANS. 0.0 0.00 - 0.04 K/UL    DF AUTOMATED     METABOLIC PANEL, COMPREHENSIVE    Collection Time: 07/09/22  9:45 AM   Result Value Ref Range    Sodium 140 136 - 145 mmol/L    Potassium 3.4 (L) 3.5 - 5.1 mmol/L    Chloride 102 97 - 108 mmol/L    CO2 32 21 - 32 mmol/L    Anion gap 6 5 - 15 mmol/L    Glucose 110 (H) 65 - 100 mg/dL    BUN 18 6 - 20 mg/dL    Creatinine 1.14 (H) 0.55 - 1.02 mg/dL    BUN/Creatinine ratio 16 12 - 20      GFR est AA 55 (L) >60 ml/min/1.73m2    GFR est non-AA 46 (L) >60 ml/min/1.73m2    Calcium 8.9 8.5 - 10.1 mg/dL    Bilirubin, total 0.3 0.2 - 1.0 mg/dL    AST (SGOT) 20 15 - 37 U/L    ALT (SGPT) 21 12 - 78 U/L    Alk. phosphatase 44 (L) 45 - 117 U/L    Protein, total 6.8 6.4 - 8.2 g/dL    Albumin 3.8 3.5 - 5.0 g/dL    Globulin 3.0 2.0 - 4.0 g/dL    A-G Ratio 1.3 1.1 - 2.2     TROPONIN-HIGH SENSITIVITY    Collection Time: 07/09/22  9:45 AM   Result Value Ref Range    Troponin-High Sensitivity 27 0 - 51 ng/L   TROPONIN-HIGH SENSITIVITY    Collection Time: 07/09/22 11:55 AM   Result Value Ref Range    Troponin-High Sensitivity 25 0 - 51 ng/L       Radiologic Studies -   CT HEAD WO CONT   Final Result      No acute intracranial abnormality on this noncontrast head CT. XR CHEST SNGL V   Final Result   No acute process. CT Results  (Last 48 hours)               07/09/22 1027  CT HEAD WO CONT Final result    Impression:      No acute intracranial abnormality on this noncontrast head CT. Narrative:  EXAM:  CT HEAD WO CONT       INDICATION: Headache, dizzy       COMPARISON: None       TECHNIQUE: Noncontrast head CT. Coronal and sagittal reformats. CT dose   reduction was achieved through the use of a standardized protocol tailored for   this examination and automatic exposure control for dose modulation. FINDINGS: The ventricles and sulci are age-appropriate without hydrocephalus. There is no mass effect or midline shift.  There is no intracranial hemorrhage or   extra-axial fluid collection. There is no abnormal area of decreased density to   suggest infarct. The calvarium is intact. The visualized paranasal sinuses and mastoid air cells   are clear. Left lens replacement. CXR Results  (Last 48 hours)               07/09/22 0954  XR CHEST SNGL V Final result    Impression:  No acute process. Narrative:  INDICATION: dizzy       EXAM:  AP CHEST RADIOGRAPH       COMPARISON: May 13, 2021       FINDINGS:       AP portable view of the chest demonstrates cardiomegaly. There is no edema,   effusion, consolidation, or pneumothorax. The osseous structures are   unremarkable. Medical Decision Making   I am the first provider for this patient. I reviewed the vital signs, available nursing notes, past medical history, past surgical history, family history and social history. Vital Signs-Reviewed the patient's vital signs. Patient Vitals for the past 12 hrs:   Temp Pulse Resp BP SpO2   07/09/22 2002 -- 61 19 (!) 160/90 99 %   07/09/22 1807 -- 60 19 (!) 158/96 97 %   07/09/22 1618 -- 63 19 135/74 98 %   07/09/22 1254 -- 90 17 (!) 158/80 99 %   07/09/22 1140 -- 61 16 (!) 168/90 98 %   07/09/22 0934 98.2 °F (36.8 °C) 68 17 (!) 181/80 99 %       Records Reviewed: Nursing Notes, Old Medical Records and Previous electrocardiograms    Provider Notes (Medical Decision Making): The patient presents with dizziness with a differential diagnosis of  cardiac dysrhythm, dizziness/vertigo, labrynthitis, TIA and CVA        ED Course:   Initial assessment performed. The patients presenting problems have been discussed, and they are in agreement with the care plan formulated and outlined with them. I have encouraged them to ask questions as they arise throughout their visit. ECG: NSR, LVH, biphasic T's in lateral leads, long  QT, no priors for comparison. No STEMI  CT head obtained without acute findings.  Cannot rule out posterior circ CVA without MRI  Labs above, no acute findings. K slightly low at 3.4, will replace. Serial troponin studies negative. Pt advised of all findings here and agrees to admission. Called hospitalist at 1400, he is occupied with acute pt.  `1620: d/w Dr. Nolan Bennett who agrees to admission. PLAN:  1. Current Discharge Medication List        2. Follow-up Information    None       Return to ED if worse     Diagnosis     Clinical Impression:   1. Dizziness    2.  Unstable gait

## 2022-07-09 NOTE — ED NOTES
Daughter came to talk to me. She stated her mom had a like a film over left eye for past few weeks, pt didn't tell me this during our triage. Dr. Young Piper notified of this conversation.

## 2022-07-10 ENCOUNTER — APPOINTMENT (OUTPATIENT)
Dept: NON INVASIVE DIAGNOSTICS | Age: 82
DRG: 149 | End: 2022-07-10
Attending: HOSPITALIST
Payer: MEDICARE

## 2022-07-10 ENCOUNTER — APPOINTMENT (OUTPATIENT)
Dept: MRI IMAGING | Age: 82
DRG: 149 | End: 2022-07-10
Attending: INTERNAL MEDICINE
Payer: MEDICARE

## 2022-07-10 VITALS
WEIGHT: 228 LBS | TEMPERATURE: 98.2 F | RESPIRATION RATE: 16 BRPM | DIASTOLIC BLOOD PRESSURE: 80 MMHG | SYSTOLIC BLOOD PRESSURE: 139 MMHG | OXYGEN SATURATION: 96 % | HEIGHT: 61 IN | HEART RATE: 66 BPM | BODY MASS INDEX: 43.05 KG/M2

## 2022-07-10 PROBLEM — R42 VERTIGO: Status: ACTIVE | Noted: 2022-07-10

## 2022-07-10 LAB
ALBUMIN SERPL-MCNC: 3.7 G/DL (ref 3.5–5)
ANION GAP SERPL CALC-SCNC: 5 MMOL/L (ref 5–15)
BUN SERPL-MCNC: 17 MG/DL (ref 6–20)
BUN/CREAT SERPL: 19 (ref 12–20)
CA-I BLD-MCNC: 9.2 MG/DL (ref 8.5–10.1)
CHLORIDE SERPL-SCNC: 104 MMOL/L (ref 97–108)
CO2 SERPL-SCNC: 33 MMOL/L (ref 21–32)
CREAT SERPL-MCNC: 0.91 MG/DL (ref 0.55–1.02)
CRP SERPL-MCNC: 0.51 MG/DL (ref 0–0.6)
ERYTHROCYTE [SEDIMENTATION RATE] IN BLOOD: 17 MM/HR (ref 0–30)
EST. AVERAGE GLUCOSE BLD GHB EST-MCNC: 131 MG/DL
GLUCOSE BLD STRIP.AUTO-MCNC: 92 MG/DL (ref 65–117)
GLUCOSE SERPL-MCNC: 104 MG/DL (ref 65–100)
HBA1C MFR BLD: 6.2 % (ref 4–5.6)
PERFORMED BY, TECHID: NORMAL
PHOSPHATE SERPL-MCNC: 2.9 MG/DL (ref 2.6–4.7)
POTASSIUM SERPL-SCNC: 3.6 MMOL/L (ref 3.5–5.1)
SODIUM SERPL-SCNC: 142 MMOL/L (ref 136–145)
TSH SERPL DL<=0.05 MIU/L-ACNC: 2.73 UIU/ML (ref 0.36–3.74)
URATE SERPL-MCNC: 4.5 MG/DL (ref 2.6–6)

## 2022-07-10 PROCEDURE — 74011000250 HC RX REV CODE- 250: Performed by: HOSPITALIST

## 2022-07-10 PROCEDURE — 84443 ASSAY THYROID STIM HORMONE: CPT

## 2022-07-10 PROCEDURE — 84550 ASSAY OF BLOOD/URIC ACID: CPT

## 2022-07-10 PROCEDURE — 74011250637 HC RX REV CODE- 250/637: Performed by: HOSPITALIST

## 2022-07-10 PROCEDURE — 82962 GLUCOSE BLOOD TEST: CPT

## 2022-07-10 PROCEDURE — 85652 RBC SED RATE AUTOMATED: CPT

## 2022-07-10 PROCEDURE — 80069 RENAL FUNCTION PANEL: CPT

## 2022-07-10 PROCEDURE — 86140 C-REACTIVE PROTEIN: CPT

## 2022-07-10 PROCEDURE — 36415 COLL VENOUS BLD VENIPUNCTURE: CPT

## 2022-07-10 PROCEDURE — 83036 HEMOGLOBIN GLYCOSYLATED A1C: CPT

## 2022-07-10 RX ORDER — LORAZEPAM 1 MG/1
1 TABLET ORAL ONCE
Status: DISCONTINUED | OUTPATIENT
Start: 2022-07-10 | End: 2022-07-10

## 2022-07-10 RX ORDER — LORAZEPAM 1 MG/1
2 TABLET ORAL
Status: DISCONTINUED | OUTPATIENT
Start: 2022-07-10 | End: 2022-07-10 | Stop reason: HOSPADM

## 2022-07-10 RX ORDER — SUMATRIPTAN 50 MG/1
50 TABLET, FILM COATED ORAL
Qty: 3 TABLET | Refills: 0 | Status: SHIPPED | OUTPATIENT
Start: 2022-07-10 | End: 2022-07-10

## 2022-07-10 RX ADMIN — GABAPENTIN 100 MG: 100 CAPSULE ORAL at 09:22

## 2022-07-10 RX ADMIN — ATORVASTATIN CALCIUM 10 MG: 10 TABLET, FILM COATED ORAL at 09:22

## 2022-07-10 RX ADMIN — METFORMIN HYDROCHLORIDE 500 MG: 500 TABLET ORAL at 07:55

## 2022-07-10 RX ADMIN — ASPIRIN 81 MG CHEWABLE TABLET 81 MG: 81 TABLET CHEWABLE at 09:22

## 2022-07-10 RX ADMIN — LOSARTAN POTASSIUM 100 MG: 50 TABLET, FILM COATED ORAL at 09:22

## 2022-07-10 RX ADMIN — MULTIVITAMIN TABLET 1 TABLET: TABLET at 09:20

## 2022-07-10 RX ADMIN — ISOSORBIDE MONONITRATE 30 MG: 30 TABLET, EXTENDED RELEASE ORAL at 09:20

## 2022-07-10 RX ADMIN — DOCUSATE SODIUM 100 MG: 100 CAPSULE, LIQUID FILLED ORAL at 09:21

## 2022-07-10 RX ADMIN — CALCIUM CARBONATE 200 MG: 500 TABLET, CHEWABLE ORAL at 09:21

## 2022-07-10 RX ADMIN — FLUTICASONE PROPIONATE 2 SPRAY: 50 SPRAY, METERED NASAL at 09:23

## 2022-07-10 RX ADMIN — HYDROCHLOROTHIAZIDE 25 MG: 25 TABLET ORAL at 09:21

## 2022-07-10 RX ADMIN — SODIUM CHLORIDE, PRESERVATIVE FREE 10 ML: 5 INJECTION INTRAVENOUS at 02:50

## 2022-07-10 RX ADMIN — ALLOPURINOL 300 MG: 300 TABLET ORAL at 09:21

## 2022-07-10 RX ADMIN — MESALAMINE 400 MG: 400 CAPSULE, DELAYED RELEASE ORAL at 09:00

## 2022-07-10 RX ADMIN — CARVEDILOL 12.5 MG: 12.5 TABLET, FILM COATED ORAL at 07:55

## 2022-07-10 RX ADMIN — PANTOPRAZOLE SODIUM 40 MG: 40 TABLET, DELAYED RELEASE ORAL at 07:55

## 2022-07-10 NOTE — PROGRESS NOTES
Hospitalist Progress Note               Daily Progress Note: 7/10/2022      Subjective: The patient is seen for follow up. Patient is an 35-year-old female with history of diabetes, hypertension who presented the ED on the evening of 7/9 with severe headache and vertigo which started yesterday evening. Her vertigo is worse with standing and sitting. Unable to ambulate because of it. She feels like things are spinning around. It has been fairly constant since it started. No nausea or vomiting. CT of the head was unremarkable. Labs were all unremarkable    Patient was admitted for further evaluation    ------    Patient is seen for follow-up.   She reports she had a good night, says that the vertigo has largely resolved overnight    Problem List:  Problem List as of 7/10/2022 Date Reviewed: 7/9/2022          Codes Class Noted - Resolved    Dizziness ICD-10-CM: R42  ICD-9-CM: 780.4  7/9/2022 - Present        Need for hepatitis C screening test ICD-10-CM: Z11.59  ICD-9-CM: V73.89  4/6/2022 - Present        History of sarcoidosis ICD-10-CM: Z86.2  ICD-9-CM: V12.29  3/6/2022 - Present        Menopausal and postmenopausal disorder ICD-10-CM: N95.9  ICD-9-CM: 627.9  3/6/2022 - Present        BMI 45.0-49.9, adult ([facility] ICD-10-CM: A87.55  ICD-9-CM: V85.42  12/11/2021 - Present        Peripheral edema ICD-10-CM: R60.9  ICD-9-CM: 782.3  9/15/2021 - Present        Controlled type 2 diabetes mellitus without complication, without long-term current use of insulin ([facility] ICD-10-CM: E11.9  ICD-9-CM: 250.00  9/15/2021 - Present        Bilateral calf pain ICD-10-CM: M79.661, I07.413  ICD-9-CM: 729.5  9/15/2021 - Present        Venous insufficiency of both lower extremities ICD-10-CM: I87.2  ICD-9-CM: 459.81  9/15/2021 - Present        Chronic pain of left ankle ICD-10-CM: M25.572, G89.29  ICD-9-CM: 719.47, 338.29  1/6/2021 - Present        Onychomycosis ICD-10-CM: B35.1  ICD-9-CM: 110.1  1/6/2021 - Present        Pure hypercholesterolemia ICD-10-CM: E78.00  ICD-9-CM: 272.0  1/6/2021 - Present        Essential hypertension ICD-10-CM: I10  ICD-9-CM: 401.9  1/6/2021 - Present        Lumbar radiculopathy ICD-10-CM: M54.16  ICD-9-CM: 724.4  1/6/2021 - Present        Gastroesophageal reflux disease without esophagitis ICD-10-CM: K21.9  ICD-9-CM: 530.81  1/6/2021 - Present        Ischemic heart disease ICD-10-CM: I25.9  ICD-9-CM: 414.9  1/6/2021 - Present        Allergic rhinitis, unspecified seasonality, unspecified trigger ICD-10-CM: J30.9  ICD-9-CM: 477.9  1/6/2021 - Present        Osteoarthritis of multiple joints, unspecified osteoarthritis type ICD-10-CM: M15.9  ICD-9-CM: 715.89  1/6/2021 - Present        Morbid obesity ([facility] ICD-10-CM: E66.01  ICD-9-CM: 278.01  1/6/2021 - Present        Mild intermittent asthma without complication XOF-09-VX: L17.00  ICD-9-CM: 493.90  1/6/2021 - Present        Chronic gout without tophus, unspecified cause, unspecified site ICD-10-CM: M1A. 9XX0  ICD-9-CM: 274.02  1/6/2021 - Present        RESOLVED: Acute non-recurrent sinusitis, unspecified location ICD-10-CM: J01.90  ICD-9-CM: 461.9  3/12/2021 - 4/6/2022        RESOLVED: Sarcoidosis ICD-10-CM: D86.9  ICD-9-CM: 135  1/6/2021 - 4/6/2022        RESOLVED: Prediabetes ICD-10-CM: R73.03  ICD-9-CM: 790.29  1/6/2021 - 9/12/2021        RESOLVED: Vitamin D deficiency ICD-10-CM: E55.9  ICD-9-CM: 268.9  1/6/2021 - 12/11/2021        RESOLVED: Encounter for screening mammogram for breast cancer ICD-10-CM: Z12.31  ICD-9-CM: V76.12  1/6/2021 - 2/5/2021              Medications reviewed  Current Facility-Administered Medications   Medication Dose Route Frequency    aspirin chewable tablet 81 mg  81 mg Oral DAILY    budesonide-formoterol (SYMBICORT) 80-4.5 mcg inhaler  2 Puff Inhalation BID    calcium carbonate (TUMS) chewable tablet 200 mg [elemental]  200 mg Oral DAILY    fluticasone propionate (FLONASE) 50 mcg/actuation nasal spray 2 Spray  2 Spray Both Nostrils DAILY    gabapentin (NEURONTIN) capsule 100 mg  100 mg Oral TID    isosorbide mononitrate ER (IMDUR) tablet 30 mg  30 mg Oral DAILY    multivitamin with folic acid (ONE DAILY WITH FOLIC ACID) tablet 1 Tablet  1 Tablet Oral DAILY    nitroglycerin (NITROSTAT) tablet 0.4 mg  0.4 mg SubLINGual Q5MIN PRN    albuterol (PROVENTIL HFA, VENTOLIN HFA, PROAIR HFA) inhaler 2 Puff  2 Puff Inhalation Q6H PRN    mesalamine (DELZICOL) DR capsule 400 mg  400 mg Oral TID    potassium chloride (KLOR-CON M10) tablet 10 mEq  10 mEq Oral DAILY    carvediloL (COREG) tablet 12.5 mg  12.5 mg Oral BID WITH MEALS    allopurinoL (ZYLOPRIM) tablet 300 mg  300 mg Oral DAILY    metFORMIN (GLUCOPHAGE) tablet 500 mg  500 mg Oral BID WITH MEALS    insulin lispro (HUMALOG) injection   SubCUTAneous AC&HS    glucose chewable tablet 16 g  4 Tablet Oral PRN    glucagon (GLUCAGEN) injection 1 mg  1 mg IntraMUSCular PRN    sodium chloride (NS) flush 5-40 mL  5-40 mL IntraVENous Q8H    sodium chloride (NS) flush 5-40 mL  5-40 mL IntraVENous PRN    acetaminophen (TYLENOL) tablet 650 mg  650 mg Oral Q6H PRN    docusate sodium (COLACE) capsule 100 mg  100 mg Oral BID    dextrose 10% infusion 0-250 mL  0-250 mL IntraVENous PRN    losartan (COZAAR) tablet 100 mg  100 mg Oral DAILY    And    hydroCHLOROthiazide (HYDRODIURIL) tablet 25 mg  25 mg Oral DAILY    pantoprazole (PROTONIX) tablet 40 mg  40 mg Oral ACB    atorvastatin (LIPITOR) tablet 10 mg  10 mg Oral DAILY     Current Outpatient Medications   Medication Sig    metFORMIN (GLUCOPHAGE) 500 mg tablet Take 500 mg by mouth two (2) times daily (with meals).  allopurinoL (ZYLOPRIM) 300 mg tablet Take 1 tablet by mouth once daily    carvediloL (Coreg) 12.5 mg tablet Take 1 Tablet by mouth two (2) times daily (with meals).  pravastatin (PRAVACHOL) 40 mg tablet Take 1 Tablet by mouth nightly.  potassium chloride (KLOR-CON M10) 10 mEq tablet Take 1 Tablet by mouth daily.  mesalamine (DELZICOL) 400 mg cdti capsule Take 400 mg by mouth three (3) times daily.  omeprazole (PRILOSEC) 40 mg capsule Take 1 Capsule by mouth daily.  losartan-hydroCHLOROthiazide (HYZAAR) 100-25 mg per tablet Take 1 tablet by mouth once daily for 90 days    albuterol (PROVENTIL HFA, VENTOLIN HFA, PROAIR HFA) 90 mcg/actuation inhaler Take 2 Puffs by inhalation every six (6) hours as needed for Wheezing.  aspirin 81 mg chewable tablet Take 81 mg by mouth daily.  budesonide-formoteroL (Symbicort) 80-4.5 mcg/actuation HFAA Take 2 Puffs by inhalation two (2) times a day.  calcium carbonate (TUMS) 200 mg calcium (500 mg) chew Take 1 Tab by mouth daily.  fluticasone propionate (Flonase Allergy Relief) 50 mcg/actuation nasal spray 2 Sprays by Both Nostrils route daily.  gabapentin (NEURONTIN) 100 mg capsule Take  by mouth three (3) times daily.  isosorbide mononitrate ER (IMDUR) 30 mg tablet Take 30 mg by mouth daily.  multivitamin (ONE A DAY) tablet Take 1 Tab by mouth daily.  nitroglycerin (Nitrostat) 0.3 mg SL tablet 0.3 mg by SubLINGual route every five (5) minutes as needed for Chest Pain. Review of Systems:   A comprehensive review of systems was negative except for that written in the HPI. Objective:   Physical Exam:     Visit Vitals  BP (!) 158/68   Pulse 65   Temp 98.2 °F (36.8 °C)   Resp 16   Ht 5' 1\" (1.549 m)   Wt 103.4 kg (228 lb)   SpO2 98%   BMI 43.08 kg/m²      O2 Device: None (Room air)    Temp (24hrs), Av.2 °F (36.8 °C), Min:98.2 °F (36.8 °C), Max:98.2 °F (36.8 °C)    No intake/output data recorded. No intake/output data recorded. General:   Awake and alert   Lungs:   Clear to auscultation bilaterally. Chest wall:  No tenderness or deformity. Heart:  Regular rate and rhythm, S1, S2 normal, no murmur, click, rub or gallop. Abdomen:   Soft, non-tender. Bowel sounds normal. No masses,  No organomegaly.    Extremities: Extremities normal, atraumatic, no cyanosis or edema. Pulses: 2+ and symmetric all extremities. Skin: Skin color, texture, turgor normal. No rashes or lesions   Neurologic: CNII-XII intact. No gross focal deficits. I performed a Hallpike maneuver with vertigo elicited in the left lateral position but no nystagmus         Data Review:       Recent Days:  Recent Labs     07/09/22  0945   WBC 8.2   HGB 12.7   HCT 39.4        Recent Labs     07/10/22  0348 07/09/22  0945    140   K 3.6 3.4*    102   CO2 33* 32   * 110*   BUN 17 18   CREA 0.91 1.14*   CA 9.2 8.9   PHOS 2.9  --    ALB 3.7 3.8   TBILI  --  0.3   ALT  --  21     No results for input(s): PH, PCO2, PO2, HCO3, FIO2 in the last 72 hours. 24 Hour Results:  Recent Results (from the past 24 hour(s))   CBC WITH AUTOMATED DIFF    Collection Time: 07/09/22  9:45 AM   Result Value Ref Range    WBC 8.2 3.6 - 11.0 K/uL    RBC 5.08 3.80 - 5.20 M/uL    HGB 12.7 11.5 - 16.0 g/dL    HCT 39.4 35.0 - 47.0 %    MCV 77.6 (L) 80.0 - 99.0 FL    MCH 25.0 (L) 26.0 - 34.0 PG    MCHC 32.2 30.0 - 36.5 g/dL    RDW 15.0 (H) 11.5 - 14.5 %    PLATELET 443 161 - 760 K/uL    MPV 10.3 8.9 - 12.9 FL    NEUTROPHILS 57 32 - 75 %    LYMPHOCYTES 30 12 - 49 %    MONOCYTES 8 5 - 13 %    EOSINOPHILS 5 0 - 7 %    BASOPHILS 0 0 - 1 %    IMMATURE GRANULOCYTES 0 0.0 - 0.5 %    ABS. NEUTROPHILS 4.7 1.8 - 8.0 K/UL    ABS. LYMPHOCYTES 2.4 0.8 - 3.5 K/UL    ABS. MONOCYTES 0.7 0.0 - 1.0 K/UL    ABS. EOSINOPHILS 0.4 0.0 - 0.4 K/UL    ABS. BASOPHILS 0.0 0.0 - 0.1 K/UL    ABS. IMM.  GRANS. 0.0 0.00 - 0.04 K/UL    DF AUTOMATED     METABOLIC PANEL, COMPREHENSIVE    Collection Time: 07/09/22  9:45 AM   Result Value Ref Range    Sodium 140 136 - 145 mmol/L    Potassium 3.4 (L) 3.5 - 5.1 mmol/L    Chloride 102 97 - 108 mmol/L    CO2 32 21 - 32 mmol/L    Anion gap 6 5 - 15 mmol/L    Glucose 110 (H) 65 - 100 mg/dL    BUN 18 6 - 20 mg/dL    Creatinine 1.14 (H) 0.55 - 1.02 mg/dL    BUN/Creatinine ratio 16 12 - 20      GFR est AA 55 (L) >60 ml/min/1.73m2    GFR est non-AA 46 (L) >60 ml/min/1.73m2    Calcium 8.9 8.5 - 10.1 mg/dL    Bilirubin, total 0.3 0.2 - 1.0 mg/dL    AST (SGOT) 20 15 - 37 U/L    ALT (SGPT) 21 12 - 78 U/L    Alk. phosphatase 44 (L) 45 - 117 U/L    Protein, total 6.8 6.4 - 8.2 g/dL    Albumin 3.8 3.5 - 5.0 g/dL    Globulin 3.0 2.0 - 4.0 g/dL    A-G Ratio 1.3 1.1 - 2.2     TROPONIN-HIGH SENSITIVITY    Collection Time: 07/09/22  9:45 AM   Result Value Ref Range    Troponin-High Sensitivity 27 0 - 51 ng/L   TROPONIN-HIGH SENSITIVITY    Collection Time: 07/09/22 11:55 AM   Result Value Ref Range    Troponin-High Sensitivity 25 0 - 51 ng/L   TSH 3RD GENERATION    Collection Time: 07/10/22  3:48 AM   Result Value Ref Range    TSH 2.73 0.36 - 3.74 uIU/mL   RENAL FUNCTION PANEL    Collection Time: 07/10/22  3:48 AM   Result Value Ref Range    Sodium 142 136 - 145 mmol/L    Potassium 3.6 3.5 - 5.1 mmol/L    Chloride 104 97 - 108 mmol/L    CO2 33 (H) 21 - 32 mmol/L    Anion gap 5 5 - 15 mmol/L    Glucose 104 (H) 65 - 100 mg/dL    BUN 17 6 - 20 mg/dL    Creatinine 0.91 0.55 - 1.02 mg/dL    BUN/Creatinine ratio 19 12 - 20      GFR est AA >60 >60 ml/min/1.73m2    GFR est non-AA 59 (L) >60 ml/min/1.73m2    Calcium 9.2 8.5 - 10.1 mg/dL    Phosphorus 2.9 2.6 - 4.7 mg/dL    Albumin 3.7 3.5 - 5.0 g/dL   SED RATE (ESR)    Collection Time: 07/10/22  3:48 AM   Result Value Ref Range    Sed rate, automated 17 0 - 30 mm/hr   C REACTIVE PROTEIN, QT    Collection Time: 07/10/22  3:48 AM   Result Value Ref Range    C-Reactive protein 0.51 0.00 - 0.60 mg/dL   URIC ACID    Collection Time: 07/10/22  3:48 AM   Result Value Ref Range    Uric acid 4.5 2.6 - 6.0 mg/dL   GLUCOSE, POC    Collection Time: 07/10/22  7:38 AM   Result Value Ref Range    Glucose (POC) 92 65 - 117 mg/dL    Performed by RAJEEV HARE        CT HEAD WO CONT   Final Result      No acute intracranial abnormality on this noncontrast head CT.          XR CHEST SNGL V   Final Result   No acute process. Assessment:  Likely benign paroxysmal positional vertigo, currently improved    Accelerated hypertension    Diabetes mellitus type 2    Diabetic neuropathy    COPD without exacerbation      Plan:  Patient has been seen by neurology and case was discussed with her  We will obtain MRI without contrast stat  If negative, patient can be discharged with sumatriptan as needed for headaches      Care Plan discussed with: Patient/Family    Disposition:     Total time spent with patient: 30 minutes.     Emily Mccord MD

## 2022-07-10 NOTE — DISCHARGE SUMMARY
Physician Discharge Summary     Patient ID:    Binta Kumar  [id number]  80 y.o.  1940    Admit date: 7/9/2022    Discharge date : 7/10/2022    Chronic Diagnoses:    Problem List as of 7/10/2022 Date Reviewed: 7/9/2022          Codes Class Noted - Resolved    Dizziness ICD-10-CM: R42  ICD-9-CM: 780.4  7/9/2022 - Present        Need for hepatitis C screening test ICD-10-CM: Z11.59  ICD-9-CM: V73.89  4/6/2022 - Present        History of sarcoidosis ICD-10-CM: Z86.2  ICD-9-CM: V12.29  3/6/2022 - Present        Menopausal and postmenopausal disorder ICD-10-CM: N95.9  ICD-9-CM: 627.9  3/6/2022 - Present        BMI 45.0-49.9, adult ([facility] ICD-10-CM: Z68.42  ICD-9-CM: V85.42  12/11/2021 - Present        Peripheral edema ICD-10-CM: R60.9  ICD-9-CM: 782.3  9/15/2021 - Present        Controlled type 2 diabetes mellitus without complication, without long-term current use of insulin ([facility] ICD-10-CM: E11.9  ICD-9-CM: 250.00  9/15/2021 - Present        Bilateral calf pain ICD-10-CM: M79.661, M79.662  ICD-9-CM: 729.5  9/15/2021 - Present        Venous insufficiency of both lower extremities ICD-10-CM: I87.2  ICD-9-CM: 459.81  9/15/2021 - Present        Chronic pain of left ankle ICD-10-CM: M25.572, G89.29  ICD-9-CM: 719.47, 338.29  1/6/2021 - Present        Onychomycosis ICD-10-CM: B35.1  ICD-9-CM: 110.1  1/6/2021 - Present        Pure hypercholesterolemia ICD-10-CM: E78.00  ICD-9-CM: 272.0  1/6/2021 - Present        Essential hypertension ICD-10-CM: I10  ICD-9-CM: 401.9  1/6/2021 - Present        Lumbar radiculopathy ICD-10-CM: M54.16  ICD-9-CM: 724.4  1/6/2021 - Present        Gastroesophageal reflux disease without esophagitis ICD-10-CM: K21.9  ICD-9-CM: 530.81  1/6/2021 - Present        Ischemic heart disease ICD-10-CM: I25.9  ICD-9-CM: 414.9  1/6/2021 - Present        Allergic rhinitis, unspecified seasonality, unspecified trigger ICD-10-CM: J30.9  ICD-9-CM: 477.9  1/6/2021 - Present Osteoarthritis of multiple joints, unspecified osteoarthritis type ICD-10-CM: M15.9  ICD-9-CM: 715.89  1/6/2021 - Present        Morbid obesity ([address] ICD-10-CM: E66.01  ICD-9-CM: 278.01  1/6/2021 - Present        Mild intermittent asthma without complication CMB-81-PB: J12.54  ICD-9-CM: 493.90  1/6/2021 - Present        Chronic gout without tophus, unspecified cause, unspecified site ICD-10-CM: M1A. 9XX0  ICD-9-CM: 274.02  1/6/2021 - Present        RESOLVED: Acute non-recurrent sinusitis, unspecified location ICD-10-CM: J01.90  ICD-9-CM: 461.9  3/12/2021 - 4/6/2022        RESOLVED: Sarcoidosis ICD-10-CM: D86.9  ICD-9-CM: 055  1/6/2021 - 4/6/2022        RESOLVED: Prediabetes ICD-10-CM: R73.03  ICD-9-CM: 790.29  1/6/2021 - 9/12/2021        RESOLVED: Vitamin D deficiency ICD-10-CM: E55.9  ICD-9-CM: 268.9  1/6/2021 - 12/11/2021        RESOLVED: Encounter for screening mammogram for breast cancer ICD-10-CM: Z12.31  ICD-9-CM: V76.12  1/6/2021 - 2/5/2021          22    Final Diagnoses:   Likely benign paroxysmal positional vertigo, currently improved     Accelerated hypertension     Diabetes mellitus type 2     Diabetic neuropathy     COPD without exacerbation       Reason for Hospitalization:  Patient is an 80-year-old female with history of diabetes, hypertension who presented the ED on the evening of 7/9 with severe headache and vertigo which started yesterday evening. Her vertigo is worse with standing and sitting. Unable to ambulate because of it. She feels like things are spinning around. It has been fairly constant since it started. No nausea or vomiting. CT of the head was unremarkable. Labs were all unremarkable      Hospital Course:   Overnight patient's vertigo largely resolved    When I saw her the next morning she was feeling well.   Hallpike maneuver was positive in a left lateral position although she did not have nystagmus    BPPV was suspected    Patient was seen by neurology who recommended an MRI due to patient's history of uncontrolled hypertension    However, patient refused to have an MRI without intravenous sedation which is against hospital policy    Patient therefore wanted to be discharged without the imaging study    Neurology did recommend a prescription for sumatriptan for possible migraines which was given              Discharge Medications:   Current Discharge Medication List      START taking these medications    Details   SUMAtriptan (IMITREX) 50 mg tablet Take 1 Tablet by mouth once as needed for Migraine for up to 1 dose. Qty: 3 Tablet, Refills: 0  Start date: 7/10/2022, End date: 7/10/2022         CONTINUE these medications which have NOT CHANGED    Details   metFORMIN (GLUCOPHAGE) 500 mg tablet Take 500 mg by mouth two (2) times daily (with meals). allopurinoL (ZYLOPRIM) 300 mg tablet Take 1 tablet by mouth once daily  Qty: 90 Tablet, Refills: 2      carvediloL (Coreg) 12.5 mg tablet Take 1 Tablet by mouth two (2) times daily (with meals). Qty: 180 Tablet, Refills: 1      pravastatin (PRAVACHOL) 40 mg tablet Take 1 Tablet by mouth nightly. Qty: 90 Tablet, Refills: 1      potassium chloride (KLOR-CON M10) 10 mEq tablet Take 1 Tablet by mouth daily. Qty: 90 Tablet, Refills: 1      mesalamine (DELZICOL) 400 mg cdti capsule Take 400 mg by mouth three (3) times daily. omeprazole (PRILOSEC) 40 mg capsule Take 1 Capsule by mouth daily. Qty: 90 Capsule, Refills: 1      losartan-hydroCHLOROthiazide (HYZAAR) 100-25 mg per tablet Take 1 tablet by mouth once daily for 90 days  Qty: 90 Tablet, Refills: 2      albuterol (PROVENTIL HFA, VENTOLIN HFA, PROAIR HFA) 90 mcg/actuation inhaler Take 2 Puffs by inhalation every six (6) hours as needed for Wheezing. Qty: 1 Inhaler, Refills: 3      aspirin 81 mg chewable tablet Take 81 mg by mouth daily. budesonide-formoteroL (Symbicort) 80-4.5 mcg/actuation HFAA Take 2 Puffs by inhalation two (2) times a day.       calcium carbonate (TUMS) 200 mg calcium (500 mg) chew Take 1 Tab by mouth daily. fluticasone propionate (Flonase Allergy Relief) 50 mcg/actuation nasal spray 2 Sprays by Both Nostrils route daily. gabapentin (NEURONTIN) 100 mg capsule Take  by mouth three (3) times daily. isosorbide mononitrate ER (IMDUR) 30 mg tablet Take 30 mg by mouth daily. multivitamin (ONE A DAY) tablet Take 1 Tab by mouth daily. nitroglycerin (Nitrostat) 0.3 mg SL tablet 0.3 mg by SubLINGual route every five (5) minutes as needed for Chest Pain. Follow up Care:    1. Shannan Moreira MD in 1-2 weeks. Please call to set up an appointment shortly after discharge. Diet:  Cardiac Diet    Disposition:  Home. Advanced Directive:   FULL    DNR      Discharge Exam:  General:  Alert, cooperative, no distress, appears stated age. Lungs:   Clear to auscultation bilaterally. Chest wall:  No tenderness or deformity. Heart:  Regular rate and rhythm, S1, S2 normal, no murmur, click, rub or gallop. Abdomen:   Soft, non-tender. Bowel sounds normal. No masses,  No organomegaly. Extremities: Extremities normal, atraumatic, no cyanosis or edema. Pulses: 2+ and symmetric all extremities. Skin: Skin color, texture, turgor normal. No rashes or lesions   Neurologic: CNII-XII intact. No gross sensory or motor deficits        CONSULTATIONS: Neurology    Significant Diagnostic Studies:   7/9/2022: BUN 18 mg/dL (Ref range: 6 - 20 mg/dL); Calcium 8.9 mg/dL (Ref range: 8.5 - 10.1 mg/dL); CO2 32 mmol/L (Ref range: 21 - 32 mmol/L); Creatinine 1.14 mg/dL (H; Ref range: 0.55 - 1.02 mg/dL); Glucose 110 mg/dL (H; Ref range: 65 - 100 mg/dL); HCT 39.4 % (Ref range: 35.0 - 47.0 %); HGB 12.7 g/dL (Ref range: 11.5 - 16.0 g/dL); Potassium 3.4 mmol/L (L; Ref range: 3.5 - 5.1 mmol/L); Sodium 140 mmol/L (Ref range: 136 - 145 mmol/L)  7/10/2022: BUN 17 mg/dL (Ref range: 6 - 20 mg/dL);  Calcium 9.2 mg/dL (Ref range: 8.5 - 10.1 mg/dL); CO2 33 mmol/L (H; Ref range: 21 - 32 mmol/L); Creatinine 0.91 mg/dL (Ref range: 0.55 - 1.02 mg/dL); Glucose 104 mg/dL (H; Ref range: 65 - 100 mg/dL); Potassium 3.6 mmol/L (Ref range: 3.5 - 5.1 mmol/L); Sodium 142 mmol/L (Ref range: 136 - 145 mmol/L)  Recent Labs     07/09/22 0945   WBC 8.2   HGB 12.7   HCT 39.4        Recent Labs     07/10/22  0348 07/09/22  0945    140   K 3.6 3.4*    102   CO2 33* 32   BUN 17 18   CREA 0.91 1.14*   * 110*   CA 9.2 8.9   PHOS 2.9  --    URICA 4.5  --      Recent Labs     07/10/22  0348 07/09/22  0945   ALT  --  21   AP  --  44*   TBILI  --  0.3   TP  --  6.8   ALB 3.7 3.8   GLOB  --  3.0     No results for input(s): INR, PTP, APTT, INREXT in the last 72 hours. No results for input(s): FE, TIBC, PSAT, FERR in the last 72 hours. No results for input(s): PH, PCO2, PO2 in the last 72 hours. No results for input(s): CPK, CKMB in the last 72 hours.     No lab exists for component: TROPONINI  Lab Results   Component Value Date/Time    Glucose (POC) 92 07/10/2022 07:38 AM    Glucose POC 93 12/15/2021 02:15 PM    Glucose  09/15/2021 11:08 AM       Discharge time spent 25 minutes    Signed:  Brianne Chowdhury MD  7/10/2022  10:50 AM

## 2022-07-10 NOTE — H&P
History and Physical              Subjective :   Chief Complaint : Dizziness associated with vertigo    Source of information : Patient, ED provider. History of present illness:   80 y.o. female with history of diabetes, hypertension, degenerative joint disease presents to the emergency room complaining of dizziness started with a severe headache. States she has significant headache still posteriorly on the head, denies any vision disturbances. But daughter states that she is having some issue with the vision feels like something film in front of the eye. Dizziness is worse with standing and sitting, she is unable to ambulate because of it. Feels things are spinning around her, denies any ringing in the ear. No nausea or vomiting. Dizziness is continuously going on since evening it is started. Never had issues like this in the past.  She had a fall because of the dizziness when she is trying to ambulate. No recent upper respiratory tract infection, sneezing or runny nose. Denies any nausea or vomiting.   Work-up in the emergency room was negative except has uncontrolled blood pressure on arrival.    Past Medical History:   Diagnosis Date    Allergic rhinitis     Angina pectoris (HCC)     Arthritis     Cardiac murmur     Chronic ischemic heart disease     Diabetes ([address]     Diverticulitis     Edema of left lower leg     GERD (gastroesophageal reflux disease)     H/O Clostridium difficile infection     Hyperlipidemia     Hypertension     Knee pain     Left tibialis posterior tendinitis     Lumbar radiculopathy     Morbid obesity (HCC)     Osteoarthritis     Sarcoidosis     Shortness of breath     Spondylosis      Past Surgical History:   Procedure Laterality Date    HX BREAST REDUCTION      30 yrs ago    HX CHOLECYSTECTOMY      HX CYST REMOVAL      HX GI  2012    hx of bowel resection from diverticulitits    IR CHOLECYSTOSTOMY PERCUTANEOUS       Family History   Family history unknown: Yes      Social History     Tobacco Use    Smoking status: Never Smoker    Smokeless tobacco: Never Used   Substance Use Topics    Alcohol use: Never       Prior to Admission medications    Medication Sig Start Date End Date Taking? Authorizing Provider   metFORMIN (GLUCOPHAGE) 500 mg tablet Take 500 mg by mouth two (2) times daily (with meals). Yes Provider, Historical   allopurinoL (ZYLOPRIM) 300 mg tablet Take 1 tablet by mouth once daily 6/28/22   Singh Torres MD   carvediloL (Coreg) 12.5 mg tablet Take 1 Tablet by mouth two (2) times daily (with meals). 6/9/22   Singh Torres MD   pravastatin (PRAVACHOL) 40 mg tablet Take 1 Tablet by mouth nightly. 5/18/22   Snigh Torres MD   potassium chloride (KLOR-CON M10) 10 mEq tablet Take 1 Tablet by mouth daily. 5/18/22   Singh Torres MD   mesalamine (DELZICOL) 400 mg cdti capsule Take 400 mg by mouth three (3) times daily. Provider, Historical   omeprazole (PRILOSEC) 40 mg capsule Take 1 Capsule by mouth daily. 4/8/22   Singh Torres MD   losartan-hydroCHLOROthiazide [facility]) 100-25 mg per tablet Take 1 tablet by mouth once daily for 90 days 10/28/21   Singh Torres MD   albuterol (PROVENTIL HFA, VENTOLIN HFA, PROAIR HFA) 90 mcg/actuation inhaler Take 2 Puffs by inhalation every six (6) hours as needed for Wheezing. 6/15/21   Singh Torres MD   aspirin 81 mg chewable tablet Take 81 mg by mouth daily. Neda Briceño MD   budesonide-formoteroL (Symbicort) 80-4.5 mcg/actuation HFAA Take 2 Puffs by inhalation two (2) times a day. Neda Briceño MD   calcium carbonate (TUMS) 200 mg calcium (500 mg) chew Take 1 Tab by mouth daily. Neda Briceño MD   fluticasone propionate (Flonase Allergy Relief) 50 mcg/actuation nasal spray 2 Sprays by Both Nostrils route daily. Neda Briceño MD   gabapentin (NEURONTIN) 100 mg capsule Take  by mouth three (3) times daily.     Neda Briceño MD   isosorbide mononitrate ER (IMDUR) 30 mg tablet Take 30 mg by mouth daily. Neda Briceño MD   multivitamin (ONE A DAY) tablet Take 1 Tab by mouth daily. Neda Briceño MD   nitroglycerin (Nitrostat) 0.3 mg SL tablet 0.3 mg by SubLINGual route every five (5) minutes as needed for Chest Pain. Neda Briceño MD     Allergies   Allergen Reactions    Clindamycin Seizures and Diarrhea     Reaction Type: Allergy             Review of Systems:  Constitutional: Appetite is fair. Denies weight loss, no fever, no chills, no night sweats. Eye: No recent visual disturbances, no discharge, no double vision. Ear/nose/mouth/throat : No hearing disturbance, no ear pain, no nasal congestion, no sore throat, no trouble swallowing. Respiratory : No trouble breathing, no cough, no shortness of breath,   Cardiovascular : No chest pain, no palpitation,  no orthopnea,  no peripheral edema. Gastrointestinal : No nausea, no vomiting, no diarrhea, No abdominal pain. Genitourinary : No dysuria, no hematuria, no increased frequency, No incontinence. Endocrine : No excessive thirst, No polyuria . Immunologic :  No seasonal allergies. Musculoskeletal : No joint swelling, ++ joint pain, No effusion,  ++ chronic back pain, . Integumentary : No rash, No pruritus,  Hematology : No petechiae, No easy bruising,    Neurology : As in history of present illness. Denies change in mental status or loss of consciousness. +++ headache, No confusion, No numbness or tingling. Psychiatric : No mood swings, No anxiety,     Vitals:     Patient Vitals for the past 12 hrs:   Temp Pulse Resp BP SpO2   07/09/22 2122 98.2 °F (36.8 °C) 72 16 (!) 175/81 97 %   07/09/22 2039 -- 63 17 (!) 153/74 99 %   07/09/22 2002 -- 61 19 (!) 160/90 99 %   07/09/22 1807 -- 60 19 (!) 158/96 97 %   07/09/22 1618 -- 63 19 135/74 98 %   07/09/22 1254 -- 90 17 (!) 158/80 99 %   07/09/22 1140 -- 61 16 (!) 168/90 98 %       Physical Exam:   General : Looks tired, no acute respiratory distress noted.   HEENT : PERRLA, normal oral mucosa, atraumatic normocephalic, Normal ear and nose. Neck : Supple, no JVD, no masses noted, no carotid bruit. Lungs : Breath sounds with good air entry bilaterally, no wheezes or rales, no accessory muscle use. CVS : Rhythm rate regular, S1+, S2+, no murmur or gallop. Abdomen : Soft, nontender, obese. Bowel sounds active. Extremities : No edema noted,  pedal pulses palpable. Musculoskeletal : Fair range of motion, no joint swelling or effusion, muscle tone and power appears fair. Skin : Moist, warm, , no pathological rash. Lymphatic : No cervical lymphadenopathy. Neurological : Awake, alert, oriented to time place person. Psychiatric : Mood and affect appears appropriate to the situation. Data Review:   Recent Results (from the past 24 hour(s))   CBC WITH AUTOMATED DIFF    Collection Time: 07/09/22  9:45 AM   Result Value Ref Range    WBC 8.2 3.6 - 11.0 K/uL    RBC 5.08 3.80 - 5.20 M/uL    HGB 12.7 11.5 - 16.0 g/dL    HCT 39.4 35.0 - 47.0 %    MCV 77.6 (L) 80.0 - 99.0 FL    MCH 25.0 (L) 26.0 - 34.0 PG    MCHC 32.2 30.0 - 36.5 g/dL    RDW 15.0 (H) 11.5 - 14.5 %    PLATELET 291 679 - 122 K/uL    MPV 10.3 8.9 - 12.9 FL    NEUTROPHILS 57 32 - 75 %    LYMPHOCYTES 30 12 - 49 %    MONOCYTES 8 5 - 13 %    EOSINOPHILS 5 0 - 7 %    BASOPHILS 0 0 - 1 %    IMMATURE GRANULOCYTES 0 0.0 - 0.5 %    ABS. NEUTROPHILS 4.7 1.8 - 8.0 K/UL    ABS. LYMPHOCYTES 2.4 0.8 - 3.5 K/UL    ABS. MONOCYTES 0.7 0.0 - 1.0 K/UL    ABS. EOSINOPHILS 0.4 0.0 - 0.4 K/UL    ABS. BASOPHILS 0.0 0.0 - 0.1 K/UL    ABS. IMM.  GRANS. 0.0 0.00 - 0.04 K/UL    DF AUTOMATED     METABOLIC PANEL, COMPREHENSIVE    Collection Time: 07/09/22  9:45 AM   Result Value Ref Range    Sodium 140 136 - 145 mmol/L    Potassium 3.4 (L) 3.5 - 5.1 mmol/L    Chloride 102 97 - 108 mmol/L    CO2 32 21 - 32 mmol/L    Anion gap 6 5 - 15 mmol/L    Glucose 110 (H) 65 - 100 mg/dL    BUN 18 6 - 20 mg/dL    Creatinine 1.14 (H) 0.55 - 1.02 mg/dL    BUN/Creatinine ratio 16 12 - 20      GFR est AA 55 (L) >60 ml/min/1.73m2    GFR est non-AA 46 (L) >60 ml/min/1.73m2    Calcium 8.9 8.5 - 10.1 mg/dL    Bilirubin, total 0.3 0.2 - 1.0 mg/dL    AST (SGOT) 20 15 - 37 U/L    ALT (SGPT) 21 12 - 78 U/L    Alk. phosphatase 44 (L) 45 - 117 U/L    Protein, total 6.8 6.4 - 8.2 g/dL    Albumin 3.8 3.5 - 5.0 g/dL    Globulin 3.0 2.0 - 4.0 g/dL    A-G Ratio 1.3 1.1 - 2.2     TROPONIN-HIGH SENSITIVITY    Collection Time: 07/09/22  9:45 AM   Result Value Ref Range    Troponin-High Sensitivity 27 0 - 51 ng/L   TROPONIN-HIGH SENSITIVITY    Collection Time: 07/09/22 11:55 AM   Result Value Ref Range    Troponin-High Sensitivity 25 0 - 51 ng/L       Radiologic Studies :   CT Results  (Last 48 hours)               07/09/22 1027  CT HEAD WO CONT Final result    Impression:      No acute intracranial abnormality on this noncontrast head CT. Narrative:  EXAM:  CT HEAD WO CONT       INDICATION: Headache, dizzy       COMPARISON: None       TECHNIQUE: Noncontrast head CT. Coronal and sagittal reformats. CT dose   reduction was achieved through the use of a standardized protocol tailored for   this examination and automatic exposure control for dose modulation. FINDINGS: The ventricles and sulci are age-appropriate without hydrocephalus. There is no mass effect or midline shift. There is no intracranial hemorrhage or   extra-axial fluid collection. There is no abnormal area of decreased density to   suggest infarct. The calvarium is intact. The visualized paranasal sinuses and mastoid air cells   are clear. Left lens replacement. CXR Results  (Last 48 hours)               07/09/22 0954  XR CHEST SNGL V Final result    Impression:  No acute process. Narrative:  INDICATION: dizzy       EXAM:  AP CHEST RADIOGRAPH       COMPARISON: May 13, 2021       FINDINGS:       AP portable view of the chest demonstrates cardiomegaly.  There is no edema,   effusion, consolidation, or pneumothorax. The osseous structures are   unremarkable. Assessment and Plan :     Dizziness associated with the vertigo: Etiology unclear, initial work-up is negative. Admitted for further evaluation, will request neurology consultation and follow with recommendations. Accelerated hypertension: Admits not regular with medications, we will continue carvedilol, isosorbide, losartan with hydrochlorothiazide, and monitor closely    Diabetes mellitus type 2 controlled well: Continue metformin 500 mg twice a day. We will keep her on Accu-Cheks with a sliding scale insulin. Neuropathy likely from diabetes, on gabapentin which we will continue. COPD with no exacerbation, history of asthma. On Symbicort inhaler and Proventil as needed which we will continue    Admitted to cardiac telemetry, full CODE STATUS, home medications reviewed and verified. Has no advance medical directives. No anticoagulation for VTE prophylaxis needed at this time. CC : Sandy Vance MD  Signed By: Kindra Childress MD     July 9, 2022      This dictation was done by dragon, computer voice recognition software. Often unanticipated grammatical, syntax, [employer] phones and other interpretive errors are inadvertently transcribed. Please excuse errors that have escaped final proofreading.

## 2022-07-10 NOTE — CONSULTS
Consult Date: 7/10/2022    IP CONSULT TO NEUROLOGY  Consult performed by: Pernell Valente MD  Consult ordered by: Raquel Calles MD       Ms. Jaycob Wheat is a 80year old woman with HTN, DM who comes in because of continuous vertigo symptoms for the past 2 days which was not improving. She describes room spinning sensation and nausea. It has improved at this time. Ct head unrevealing. She has never had these symptoms before. Patient also describes 2 week history of left occipital headaches with left eye glare and lacrimation which can go on for many hours. It has bene occurring on and off.      Subjective     Past Medical History:   Diagnosis Date    Allergic rhinitis     Angina pectoris (HCC)     Arthritis     Cardiac murmur     Chronic ischemic heart disease     Diabetes [address] [address]     Diverticulitis     Edema of left lower leg     GERD (gastroesophageal reflux disease)     H/O Clostridium difficile infection     Hyperlipidemia     Hypertension     Knee pain     Left tibialis posterior tendinitis     Lumbar radiculopathy     Morbid obesity (HCC)     Osteoarthritis     Sarcoidosis     Shortness of breath     Spondylosis       Past Surgical History:   Procedure Laterality Date    HX BREAST REDUCTION      30 yrs ago    HX CHOLECYSTECTOMY      HX CYST REMOVAL      HX GI  2012    hx of bowel resection from diverticulitits    IR CHOLECYSTOSTOMY PERCUTANEOUS       Family History   Family history unknown: Yes      Social History     Tobacco Use    Smoking status: Never Smoker    Smokeless tobacco: Never Used   Substance Use Topics    Alcohol use: Never       Current Facility-Administered Medications   Medication Dose Route Frequency Provider Last Rate Last Admin    LORazepam (ATIVAN) tablet 2 mg  2 mg Oral NOW Meli Stone MD        aspirin chewable tablet 81 mg  81 mg Oral DAILY Raquel Calles MD   81 mg at 07/10/22 0922    budesonide-formoterol (SYMBICORT) 80-4.5 mcg inhaler  2 Puff Inhalation BID Balbir Lea MD        calcium carbonate (TUMS) chewable tablet 200 mg [elemental]  200 mg Oral DAILY Balbir Lea MD   200 mg at 07/10/22 0921    fluticasone propionate (FLONASE) 50 mcg/actuation nasal spray 2 Spray  2 Spray Both Nostrils DAILY Balbir Lea MD   2 Spray at 07/10/22 2964    gabapentin (NEURONTIN) capsule 100 mg  100 mg Oral TID Balbir Lea MD   100 mg at 07/10/22 7586    isosorbide mononitrate ER (IMDUR) tablet 30 mg  30 mg Oral DAILY Balbir Lea MD   30 mg at 07/10/22 0920    multivitamin with folic acid (ONE DAILY WITH FOLIC ACID) tablet 1 Tablet  1 Tablet Oral DAILY Balbir Lea MD   1 Tablet at 07/10/22 0920    nitroglycerin (NITROSTAT) tablet 0.4 mg  0.4 mg SubLINGual Q5MIN PRN Balbir Lea MD        albuterol (PROVENTIL HFA, VENTOLIN HFA, PROAIR HFA) inhaler 2 Puff  2 Puff Inhalation Q6H PRN Balbir Lea MD        mesalamine (DELZICOL) DR capsule 400 mg  400 mg Oral TID Balbir Lea MD   400 mg at 07/10/22 0900    potassium chloride (KLOR-CON M10) tablet 10 mEq  10 mEq Oral DAILY Balbir Lea MD        carvediloL (COREG) tablet 12.5 mg  12.5 mg Oral BID WITH MEALS Balbir Lea MD   12.5 mg at 07/10/22 0755    allopurinoL (ZYLOPRIM) tablet 300 mg  300 mg Oral DAILY Balbir Lea MD   300 mg at 07/10/22 7540    metFORMIN (GLUCOPHAGE) tablet 500 mg  500 mg Oral BID WITH MEALS Balbir Lea MD   500 mg at 07/10/22 0755    insulin lispro (HUMALOG) injection   SubCUTAneous AC&HS Balbir Lea MD        glucose chewable tablet 16 g  4 Tablet Oral PRN Balbir Lea MD        glucagon (GLUCAGEN) injection 1 mg  1 mg IntraMUSCular PRN Balbir Lea MD        sodium chloride (NS) flush 5-40 mL  5-40 mL IntraVENous Q8H Balbir Lea MD   10 mL at 07/10/22 0250    sodium chloride (NS) flush 5-40 mL  5-40 mL IntraVENous PRN Jackson Nevarez MD        acetaminophen (TYLENOL) tablet 650 mg  650 mg Oral Q6H PRN Jackson Nevarez MD        docusate sodium (COLACE) capsule 100 mg  100 mg Oral BID Jackson Nevarez MD   100 mg at 07/10/22 0646    dextrose 10% infusion 0-250 mL  0-250 mL IntraVENous PRN Jackson Nevarez MD        losartan (COZAAR) tablet 100 mg  100 mg Oral DAILY Jackson Nevarez MD   100 mg at 07/10/22 1578    And    hydroCHLOROthiazide (HYDRODIURIL) tablet 25 mg  25 mg Oral DAILY Jackson Nevarez MD   25 mg at 07/10/22 0921    pantoprazole (PROTONIX) tablet 40 mg  40 mg Oral ACB Jackson Nevarez MD   40 mg at 07/10/22 0755    atorvastatin (LIPITOR) tablet 10 mg  10 mg Oral DAILY Jackson Nevarez MD   10 mg at 07/10/22 2620     Current Outpatient Medications   Medication Sig Dispense Refill    metFORMIN (GLUCOPHAGE) 500 mg tablet Take 500 mg by mouth two (2) times daily (with meals).  allopurinoL (ZYLOPRIM) 300 mg tablet Take 1 tablet by mouth once daily 90 Tablet 2    carvediloL (Coreg) 12.5 mg tablet Take 1 Tablet by mouth two (2) times daily (with meals). 180 Tablet 1    pravastatin (PRAVACHOL) 40 mg tablet Take 1 Tablet by mouth nightly. 90 Tablet 1    potassium chloride (KLOR-CON M10) 10 mEq tablet Take 1 Tablet by mouth daily. 90 Tablet 1    mesalamine (DELZICOL) 400 mg cdti capsule Take 400 mg by mouth three (3) times daily.  omeprazole (PRILOSEC) 40 mg capsule Take 1 Capsule by mouth daily. 90 Capsule 1    losartan-hydroCHLOROthiazide (HYZAAR) 100-25 mg per tablet Take 1 tablet by mouth once daily for 90 days 90 Tablet 2    albuterol (PROVENTIL HFA, VENTOLIN HFA, PROAIR HFA) 90 mcg/actuation inhaler Take 2 Puffs by inhalation every six (6) hours as needed for Wheezing. 1 Inhaler 3    aspirin 81 mg chewable tablet Take 81 mg by mouth daily.       budesonide-formoteroL (Symbicort) 80-4.5 mcg/actuation HFAA Take 2 Puffs by inhalation two (2) times a day.  calcium carbonate (TUMS) 200 mg calcium (500 mg) chew Take 1 Tab by mouth daily.  fluticasone propionate (Flonase Allergy Relief) 50 mcg/actuation nasal spray 2 Sprays by Both Nostrils route daily.  gabapentin (NEURONTIN) 100 mg capsule Take  by mouth three (3) times daily.  isosorbide mononitrate ER (IMDUR) 30 mg tablet Take 30 mg by mouth daily.  multivitamin (ONE A DAY) tablet Take 1 Tab by mouth daily.  nitroglycerin (Nitrostat) 0.3 mg SL tablet 0.3 mg by SubLINGual route every five (5) minutes as needed for Chest Pain. Review of Systems   Neurological: Positive for dizziness, facial asymmetry and headaches. All other systems reviewed and are negative. Objective     Vital signs for last 24 hours:  Visit Vitals  /76   Pulse 74   Temp 98.2 °F (36.8 °C)   Resp 16   Ht 5' 1\" (1.549 m)   Wt 103.4 kg (228 lb)   SpO2 97%   BMI 43.08 kg/m²       Intake/Output this shift:  Current Shift: No intake/output data recorded. Last 3 Shifts: No intake/output data recorded.     Data Review:   Recent Results (from the past 24 hour(s))   TROPONIN-HIGH SENSITIVITY    Collection Time: 07/09/22 11:55 AM   Result Value Ref Range    Troponin-High Sensitivity 25 0 - 51 ng/L   TSH 3RD GENERATION    Collection Time: 07/10/22  3:48 AM   Result Value Ref Range    TSH 2.73 0.36 - 3.74 uIU/mL   RENAL FUNCTION PANEL    Collection Time: 07/10/22  3:48 AM   Result Value Ref Range    Sodium 142 136 - 145 mmol/L    Potassium 3.6 3.5 - 5.1 mmol/L    Chloride 104 97 - 108 mmol/L    CO2 33 (H) 21 - 32 mmol/L    Anion gap 5 5 - 15 mmol/L    Glucose 104 (H) 65 - 100 mg/dL    BUN 17 6 - 20 mg/dL    Creatinine 0.91 0.55 - 1.02 mg/dL    BUN/Creatinine ratio 19 12 - 20      GFR est AA >60 >60 ml/min/1.73m2    GFR est non-AA 59 (L) >60 ml/min/1.73m2    Calcium 9.2 8.5 - 10.1 mg/dL    Phosphorus 2.9 2.6 - 4.7 mg/dL    Albumin 3.7 3.5 - 5.0 g/dL   SED RATE (ESR)    Collection Time: 07/10/22  3:48 AM   Result Value Ref Range    Sed rate, automated 17 0 - 30 mm/hr   C REACTIVE PROTEIN, QT    Collection Time: 07/10/22  3:48 AM   Result Value Ref Range    C-Reactive protein 0.51 0.00 - 0.60 mg/dL   URIC ACID    Collection Time: 07/10/22  3:48 AM   Result Value Ref Range    Uric acid 4.5 2.6 - 6.0 mg/dL   GLUCOSE, POC    Collection Time: 07/10/22  7:38 AM   Result Value Ref Range    Glucose (POC) 92 65 - 117 mg/dL    Performed by [facility]        Physical Exam    Neuro Physical Exam      General: Well developed, well nourished. Patient in no apparent distress. Cardiac: Regular rate and rhythm       Neurological Exam:  Mental Status: Awake, alert, oriented x4, normal speech   Cranial Nerves:   Intact visual fields. PERRL, EOM's full, right horizontal nystagmus, no ptosis. Facial sensation is normal. Mild left facial droop. Palate is midline. Normal sternocleidomastoid strength. Tongue is midline. Hearing is intact bilaterally. Motor:  5/5 strength in upper and lower proximal and distal muscles. Normal bulk and tone. Reflexes:   Deep tendon reflexes 2+/4 and symmetrical.   Sensory:   Normal to light touch throughout   Gait:  Normal gait. Tremor:   No tremor noted. Cerebellar:  No cerebellar signs present. Neurovascular:      No carotid bruits. Assessment and Plan: Ms. Jarod Bella is a 80year old woman with vascular risk factors with acute onset vertigo and new onset headaches. She does need an mri to rule out a posterior circulation stroke. Will start asa 81 mg daily.     - If mri normal no further workup needed and she can be discharged home. - Headaches: paroxysmal hemicrania is on the differential. Please discharge with sumatriptan pills 100 mg as needed. No more than 9 pills in 1 month.

## 2022-07-10 NOTE — ED NOTES
Pt discharge instructions given, pt is understanding of instructions, follow up and new Medication administration. IV Removed. Belongings given to patient. Pt refused MRI due to not being able to be \"put to sleep\" with IV medication during procedure. Pt to follow up outpatient with neurology for MRI.

## 2022-07-11 ENCOUNTER — PATIENT OUTREACH (OUTPATIENT)
Dept: CASE MANAGEMENT | Age: 82
End: 2022-07-11

## 2022-07-11 LAB
ATRIAL RATE: 69 BPM
CALCULATED P AXIS, ECG09: 40 DEGREES
CALCULATED R AXIS, ECG10: -13 DEGREES
CALCULATED T AXIS, ECG11: 135 DEGREES
DIAGNOSIS, 93000: NORMAL
P-R INTERVAL, ECG05: 198 MS
Q-T INTERVAL, ECG07: 508 MS
QRS DURATION, ECG06: 98 MS
QTC CALCULATION (BEZET), ECG08: 544 MS
VENTRICULAR RATE, ECG03: 69 BPM

## 2022-07-11 NOTE — PROGRESS NOTES
Care Transitions Initial Call    Call within 2 business days of discharge: Yes     Patient: Rio Brown Patient : 1940 MRN: [id number]    Last Discharge [address]       Complaint Diagnosis Description Type Department Provider    22 Dizziness Dizziness . .. ED (ADMIT) JASPAL Thrasher MD; Ryan Johnson. .. Was this an external facility discharge? No Discharge Facility: [facility]    Challenges to be reviewed by the provider   -declined MRI during hospitalization          Method of communication with provider : none    Discussed COVID-19 related testing which was not done at this time. Advance Care Planning:   Does patient have an Advance Directive: not on file, patient declined education. Inpatient Readmission Risk score: Unplanned Readmit Risk Score: 7.9 ( )    Was this a readmission? no   Patient stated reason for the admission: \"dizziness\"    Patients top risk factors for readmission: none noted   Interventions to address risk factors: Obtained and reviewed discharge summary and/or continuity of care documents    Care Transition Nurse (CTN) contacted the family by telephone to perform post hospital discharge assessment. Verified name and  with family as identifiers. Provided introduction to self, and explanation of the CTN role. CTN reviewed discharge instructions, medical action plan and red flags with family who verbalized understanding. Were discharge instructions available to patient? yes. Reviewed appropriate site of care based on symptoms and resources available to patient including: PCP and Specialist. Family given an opportunity to ask questions and does not have any further questions or concerns at this time. The family agrees to contact the PCP office for questions related to their healthcare. Medication reconciliation was performed with family, who verbalizes understanding of administration of home medications.  Advised obtaining a 90-day supply of all daily and as-needed medications. Referral to Pharm D needed: no     Home Health/Outpatient orders at discharge: none    Durable Medical Equipment ordered at discharge: None      Was patient discharged with a pulse oximeter? no    Discussed follow-up appointments. If no appointment was previously scheduled, appointment scheduling offered: yes. Is follow up appointment scheduled within 7 days of discharge? no.   [facility] follow up appointment(s):   Future Appointments   Date Time Provider Wes Arguello   7/19/2022  9:00 AM Saima Godwin MD RS BS AMB   7/19/2022 11:20 AM SRM KELVIN 1 [facility]MR[facility]     Non-[facility] follow up appointment(s): none    Plan for follow-up call in 5-7 days based on severity of symptoms and risk factors. Plan for next call: BP readings, follow up   CTN provided contact information for future needs. Goals Addressed                 This Visit's Progress     Attends follow-up appointments as directed. 7/11/2022  -Will attend PCP follow up on 7/19  -CTN to follow up in 1 week  SP       Prevent complications post hospitalization. 7/11/2022  -Daughter purchased BP cuff online. Will arrive in 2 days. Daughter to monitor BP daily and record. Will report bp >180/100 or <80/40.   -CTN to follow up in 1 week  SP

## 2022-07-12 ENCOUNTER — TELEPHONE (OUTPATIENT)
Dept: INTERNAL MEDICINE CLINIC | Age: 82
End: 2022-07-12

## 2022-07-12 DIAGNOSIS — R42 VERTIGO: Primary | ICD-10-CM

## 2022-07-12 NOTE — TELEPHONE ENCOUNTER
Needs referral to Neurology, hospital wants MRI because of vertigo, should she wait to come in and talk to you at her visit on July 19?

## 2022-07-13 ENCOUNTER — OFFICE VISIT (OUTPATIENT)
Dept: ENT CLINIC | Age: 82
End: 2022-07-13
Payer: MEDICARE

## 2022-07-13 VITALS
HEIGHT: 61 IN | WEIGHT: 228 LBS | DIASTOLIC BLOOD PRESSURE: 90 MMHG | BODY MASS INDEX: 43.05 KG/M2 | SYSTOLIC BLOOD PRESSURE: 140 MMHG | HEART RATE: 70 BPM | RESPIRATION RATE: 16 BRPM | OXYGEN SATURATION: 98 % | TEMPERATURE: 97.7 F

## 2022-07-13 DIAGNOSIS — H83.02 LABYRINTHITIS OF LEFT EAR: ICD-10-CM

## 2022-07-13 DIAGNOSIS — R42 VERTIGO: Primary | ICD-10-CM

## 2022-07-13 PROCEDURE — 1090F PRES/ABSN URINE INCON ASSESS: CPT | Performed by: OTOLARYNGOLOGY

## 2022-07-13 PROCEDURE — G8755 DIAS BP > OR = 90: HCPCS | Performed by: OTOLARYNGOLOGY

## 2022-07-13 PROCEDURE — G8417 CALC BMI ABV UP PARAM F/U: HCPCS | Performed by: OTOLARYNGOLOGY

## 2022-07-13 PROCEDURE — 1101F PT FALLS ASSESS-DOCD LE1/YR: CPT | Performed by: OTOLARYNGOLOGY

## 2022-07-13 PROCEDURE — G8510 SCR DEP NEG, NO PLAN REQD: HCPCS | Performed by: OTOLARYNGOLOGY

## 2022-07-13 PROCEDURE — 99204 OFFICE O/P NEW MOD 45 MIN: CPT | Performed by: OTOLARYNGOLOGY

## 2022-07-13 PROCEDURE — 1123F ACP DISCUSS/DSCN MKR DOCD: CPT | Performed by: OTOLARYNGOLOGY

## 2022-07-13 PROCEDURE — 1111F DSCHRG MED/CURRENT MED MERGE: CPT | Performed by: OTOLARYNGOLOGY

## 2022-07-13 PROCEDURE — G8400 PT W/DXA NO RESULTS DOC: HCPCS | Performed by: OTOLARYNGOLOGY

## 2022-07-13 PROCEDURE — G8536 NO DOC ELDER MAL SCRN: HCPCS | Performed by: OTOLARYNGOLOGY

## 2022-07-13 PROCEDURE — G8427 DOCREV CUR MEDS BY ELIG CLIN: HCPCS | Performed by: OTOLARYNGOLOGY

## 2022-07-13 PROCEDURE — G8753 SYS BP > OR = 140: HCPCS | Performed by: OTOLARYNGOLOGY

## 2022-07-13 NOTE — PROGRESS NOTES
Visit Vitals  BP (!) 140/90 (BP 1 Location: Left upper arm, BP Patient Position: Sitting, BP Cuff Size: Large adult)   Pulse 70   Temp 97.7 °F (36.5 °C) (Temporal)   Resp 16   Ht 5' 1\" (1.549 m)   Wt 228 lb (103.4 kg)   SpO2 98%   BMI 43.08 kg/m²     Chief Complaint   Patient presents with    New Patient     Vertigo

## 2022-07-13 NOTE — PROGRESS NOTES
Subjective:    Amie Taylor   80 y.o.   1940     New Patient Visit    Location - head    Quality - dizziness, headaches    Severity -  moderate    Duration - 3 days ago    Timing - sudden    Context - pt had woke up with dizziness, posterior headache, left ear tightness, lasted 3 days; she was at [address] [place] [address], had negative CT scan and was scheduled for MRI but needs sedation for this; had similar spell over 25 years ago    Modifying Features - none    Associated symptoms/signs - as above      Review of Systems  Review of Systems   Constitutional: Negative for chills and fever. HENT: Negative for ear pain, hearing loss, nosebleeds and tinnitus. Eyes: Negative for blurred vision and double vision. Respiratory: Negative for cough, sputum production and shortness of breath. Cardiovascular: Negative for chest pain and palpitations. Gastrointestinal: Negative for heartburn, nausea and vomiting. Musculoskeletal: Negative for joint pain and neck pain. Skin: Negative. Neurological: Positive for headaches. Negative for dizziness, speech change and weakness. Endo/Heme/Allergies: Negative for environmental allergies. Does not bruise/bleed easily. Psychiatric/Behavioral: Negative for memory loss. The patient does not have insomnia.           Past Medical History:   Diagnosis Date    Allergic rhinitis     Angina pectoris (HCC)     Arthritis     Cardiac murmur     Chronic ischemic heart disease     Diabetes ([facility] [address]     Diverticulitis     Edema of left lower leg     GERD (gastroesophageal reflux disease)     H/O Clostridium difficile infection     Hyperlipidemia     Hypertension     Knee pain     Left tibialis posterior tendinitis     Lumbar radiculopathy     Morbid obesity (HCC)     Osteoarthritis     Sarcoidosis     Shortness of breath     Spondylosis      Past Surgical History:   Procedure Laterality Date    HX BREAST REDUCTION      30 yrs ago    HX CHOLECYSTECTOMY      HX CYST REMOVAL      HX GI  2012    hx of bowel resection from diverticulitits    IR CHOLECYSTOSTOMY PERCUTANEOUS        Family History   Family history unknown: Yes     Social History     Tobacco Use    Smoking status: Never Smoker    Smokeless tobacco: Never Used   Substance Use Topics    Alcohol use: Never      Prior to Admission medications    Medication Sig Start Date End Date Taking? Authorizing Provider   metFORMIN (GLUCOPHAGE) 500 mg tablet Take 500 mg by mouth two (2) times daily (with meals). Yes Provider, Historical   allopurinoL (ZYLOPRIM) 300 mg tablet Take 1 tablet by mouth once daily 6/28/22  Yes Salina Palacios MD   carvediloL (Coreg) 12.5 mg tablet Take 1 Tablet by mouth two (2) times daily (with meals). 6/9/22  Yes Salina Palacios MD   pravastatin (PRAVACHOL) 40 mg tablet Take 1 Tablet by mouth nightly. 5/18/22  Yes Salina Palacios MD   potassium chloride (KLOR-CON M10) 10 mEq tablet Take 1 Tablet by mouth daily. 5/18/22  Yes Salina Palacios MD   mesalamine (DELZICOL) 400 mg cdti capsule Take 400 mg by mouth three (3) times daily. Yes Provider, Historical   omeprazole (PRILOSEC) 40 mg capsule Take 1 Capsule by mouth daily. 4/8/22  Yes Salina Palacios MD   losartan-hydroCHLOROthiazide [facility]) 100-25 mg per tablet Take 1 tablet by mouth once daily for 90 days 10/28/21  Yes Salina Palacios MD   albuterol (PROVENTIL HFA, VENTOLIN HFA, PROAIR HFA) 90 mcg/actuation inhaler Take 2 Puffs by inhalation every six (6) hours as needed for Wheezing. 6/15/21  Yes Salina Palacios MD   aspirin 81 mg chewable tablet Take 81 mg by mouth daily. Yes Neda Briceño MD   budesonide-formoteroL (Symbicort) 80-4.5 mcg/actuation HFAA Take 2 Puffs by inhalation two (2) times a day. Yes Neda Briceño MD   calcium carbonate (TUMS) 200 mg calcium (500 mg) chew Take 1 Tab by mouth daily.    Yes Neda Briceño MD   fluticasone propionate (Flonase Allergy Relief) 50 mcg/actuation nasal spray 2 Sprays by Both Nostrils route daily. Yes Navarro, MD Neda   gabapentin (NEURONTIN) 100 mg capsule Take  by mouth three (3) times daily. Yes Neda Briceño MD   isosorbide mononitrate ER (IMDUR) 30 mg tablet Take 30 mg by mouth daily. Yes Navarro, MD Neda   multivitamin (ONE A DAY) tablet Take 1 Tab by mouth daily. Yes Navarro, MD Neda   nitroglycerin (Nitrostat) 0.3 mg SL tablet 0.3 mg by SubLINGual route every five (5) minutes as needed for Chest Pain. Yes Navarro, MD Neda        Allergies   Allergen Reactions    Clindamycin Seizures and Diarrhea     Reaction Type: Allergy         Objective:     Visit Vitals  BP (!) 140/90 (BP 1 Location: Left upper arm, BP Patient Position: Sitting, BP Cuff Size: Large adult)   Pulse 70   Temp 97.7 °F (36.5 °C) (Temporal)   Resp 16   Ht 5' 1\" (1.549 m)   Wt 228 lb (103.4 kg)   SpO2 98%   BMI 43.08 kg/m²        Physical Exam  Vitals reviewed. Constitutional:       General: She is awake. She is not in acute distress. Appearance: Normal appearance. She is well-groomed. She is obese. HENT:      Head: Normocephalic and atraumatic. Jaw: There is normal jaw occlusion. No trismus, tenderness or malocclusion. Salivary Glands: Right salivary gland is not diffusely enlarged or tender. Left salivary gland is not diffusely enlarged or tender. Right Ear: Hearing, tympanic membrane, ear canal and external ear normal.      Left Ear: Hearing, tympanic membrane, ear canal and external ear normal.      Nose: No nasal deformity, septal deviation or mucosal edema. Right Nostril: No epistaxis. Left Nostril: No epistaxis. Right Turbinates: Not enlarged, swollen or pale. Left Turbinates: Not enlarged, swollen or pale. Right Sinus: No maxillary sinus tenderness or frontal sinus tenderness. Left Sinus: No maxillary sinus tenderness or frontal sinus tenderness. Mouth/Throat:      Lips: Pink. No lesions. Mouth: Mucous membranes are moist. No oral lesions.       Dentition: Normal dentition. No dental caries. Tongue: No lesions. Palate: No mass and lesions. Pharynx: Oropharynx is clear. Uvula midline. No oropharyngeal exudate or posterior oropharyngeal erythema. Tonsils: No tonsillar exudate. 0 on the right. 0 on the left. Eyes:      General: Vision grossly intact. Extraocular Movements: Extraocular movements intact. Right eye: No nystagmus. Left eye: No nystagmus. Conjunctiva/sclera: Conjunctivae normal.      Pupils: Pupils are equal, round, and reactive to light. Neck:      Thyroid: No thyroid mass, thyromegaly or thyroid tenderness. Trachea: Trachea and phonation normal. No tracheal tenderness or tracheal deviation. Cardiovascular:      Rate and Rhythm: Normal rate and regular rhythm. Pulmonary:      Effort: Pulmonary effort is normal. No respiratory distress. Breath sounds: No stridor. Musculoskeletal:         General: No swelling or tenderness. Normal range of motion. Cervical back: No edema or erythema. Lymphadenopathy:      Cervical: No cervical adenopathy. Skin:     General: Skin is warm and dry. Findings: No lesion or rash. Neurological:      General: No focal deficit present. Mental Status: She is alert and oriented to person, place, and time. Mental status is at baseline. Cranial Nerves: Cranial nerves are intact. Coordination: Romberg sign negative. Gait: Gait is intact. Psychiatric:         Mood and Affect: Mood normal.         Behavior: Behavior normal. Behavior is cooperative. Assessment/Plan:     Encounter Diagnoses   Name Primary?  Vertigo Yes    Labyrinthitis of left ear      Reviewed her hospital notes and imaging. Neg CT    Symptom better. Likely a labyrinthitis, do not suspect stroke. Can hold off any further testing/imaging for now. Fu 1 mo    No orders of the defined types were placed in this encounter.           Thank you for referring this patient,    Jw Pineda MD, [address] [facility]    [address]  [address] BWDWYEM JARED [address]  [place], [place] [address] [facility] [phone]

## 2022-07-13 NOTE — LETTER
7/13/2022    Patient: Zackery Robles   YOB: 1940   Date of Visit: 7/13/2022     Bob Nolan MD  [address] [facility],[address], [facility]  [address] [facility] [address]    Dear Bob Nolan MD,      Thank you for referring Ms. Tenzin Saavedra to [facility] E[facility]SE [facility] THROAT [address], THROAT AND ALLERGY CARE for evaluation. My notes for this consultation are attached. If you have questions, please do not hesitate to call me. I look forward to following your patient along with you.       Sincerely,    Terri Duarte MD

## 2022-07-16 PROBLEM — M47.26 OSTEOARTHRITIS OF SPINE WITH RADICULOPATHY, LUMBAR REGION: Status: ACTIVE | Noted: 2022-07-16

## 2022-07-16 PROBLEM — N18.30 CHRONIC RENAL DISEASE, STAGE III (HCC): Status: ACTIVE | Noted: 2022-07-16

## 2022-07-16 PROBLEM — M79.661 BILATERAL CALF PAIN: Status: RESOLVED | Noted: 2021-09-15 | Resolved: 2022-07-16

## 2022-07-16 PROBLEM — E11.22 TYPE 2 DIABETES MELLITUS WITH CHRONIC KIDNEY DISEASE (HCC): Status: ACTIVE | Noted: 2022-07-16

## 2022-07-16 PROBLEM — M79.662 BILATERAL CALF PAIN: Status: RESOLVED | Noted: 2021-09-15 | Resolved: 2022-07-16

## 2022-07-16 PROBLEM — E11.22 TYPE 2 DIABETES MELLITUS WITH CHRONIC KIDNEY DISEASE (HCC): Status: RESOLVED | Noted: 2022-07-16 | Resolved: 2022-07-16

## 2022-07-16 PROBLEM — R42 DIZZINESS: Status: RESOLVED | Noted: 2022-07-09 | Resolved: 2022-07-16

## 2022-07-16 PROBLEM — E66.813 CLASS 3 SEVERE OBESITY WITH SERIOUS COMORBIDITY AND BODY MASS INDEX (BMI) OF 40.0 TO 44.9 IN ADULT, UNSPECIFIED OBESITY TYPE: Status: ACTIVE | Noted: 2021-01-06

## 2022-07-16 PROBLEM — R73.03 PREDIABETES: Status: ACTIVE | Noted: 2021-01-06

## 2022-07-16 PROBLEM — E66.01 CLASS 3 SEVERE OBESITY WITH SERIOUS COMORBIDITY AND BODY MASS INDEX (BMI) OF 40.0 TO 44.9 IN ADULT, UNSPECIFIED OBESITY TYPE (HCC): Status: ACTIVE | Noted: 2021-01-06

## 2022-07-18 ENCOUNTER — PATIENT OUTREACH (OUTPATIENT)
Dept: CASE MANAGEMENT | Age: 82
End: 2022-07-18

## 2022-07-19 ENCOUNTER — OFFICE VISIT (OUTPATIENT)
Dept: INTERNAL MEDICINE CLINIC | Age: 82
End: 2022-07-19
Payer: MEDICARE

## 2022-07-19 VITALS
TEMPERATURE: 97.9 F | OXYGEN SATURATION: 98 % | DIASTOLIC BLOOD PRESSURE: 78 MMHG | HEART RATE: 60 BPM | SYSTOLIC BLOOD PRESSURE: 138 MMHG | HEIGHT: 61 IN | RESPIRATION RATE: 16 BRPM | WEIGHT: 235.4 LBS | BODY MASS INDEX: 44.45 KG/M2

## 2022-07-19 DIAGNOSIS — Z86.2 HISTORY OF SARCOIDOSIS: ICD-10-CM

## 2022-07-19 DIAGNOSIS — Z23 ENCOUNTER FOR IMMUNIZATION: ICD-10-CM

## 2022-07-19 DIAGNOSIS — R42 DIZZINESS: ICD-10-CM

## 2022-07-19 DIAGNOSIS — K21.9 GASTROESOPHAGEAL REFLUX DISEASE WITHOUT ESOPHAGITIS: ICD-10-CM

## 2022-07-19 DIAGNOSIS — M47.26 OSTEOARTHRITIS OF SPINE WITH RADICULOPATHY, LUMBAR REGION: ICD-10-CM

## 2022-07-19 DIAGNOSIS — Z12.31 SCREENING MAMMOGRAM FOR BREAST CANCER: ICD-10-CM

## 2022-07-19 DIAGNOSIS — E78.00 PURE HYPERCHOLESTEROLEMIA: ICD-10-CM

## 2022-07-19 DIAGNOSIS — M15.9 OSTEOARTHRITIS OF MULTIPLE JOINTS, UNSPECIFIED OSTEOARTHRITIS TYPE: ICD-10-CM

## 2022-07-19 DIAGNOSIS — E66.01 CLASS 3 SEVERE OBESITY WITH SERIOUS COMORBIDITY AND BODY MASS INDEX (BMI) OF 40.0 TO 44.9 IN ADULT, UNSPECIFIED OBESITY TYPE (HCC): ICD-10-CM

## 2022-07-19 DIAGNOSIS — I10 ESSENTIAL HYPERTENSION: ICD-10-CM

## 2022-07-19 DIAGNOSIS — J45.20 MILD INTERMITTENT ASTHMA WITHOUT COMPLICATION: ICD-10-CM

## 2022-07-19 DIAGNOSIS — M1A.9XX0 CHRONIC GOUT WITHOUT TOPHUS, UNSPECIFIED CAUSE, UNSPECIFIED SITE: ICD-10-CM

## 2022-07-19 DIAGNOSIS — J30.9 ALLERGIC RHINITIS, UNSPECIFIED SEASONALITY, UNSPECIFIED TRIGGER: ICD-10-CM

## 2022-07-19 DIAGNOSIS — R73.03 PREDIABETES: ICD-10-CM

## 2022-07-19 DIAGNOSIS — I25.9 ISCHEMIC HEART DISEASE: ICD-10-CM

## 2022-07-19 PROBLEM — N18.30 CHRONIC RENAL DISEASE, STAGE III (HCC): Status: RESOLVED | Noted: 2022-07-16 | Resolved: 2022-07-19

## 2022-07-19 LAB — GLUCOSE POC: 145 MG/DL

## 2022-07-19 PROCEDURE — 1090F PRES/ABSN URINE INCON ASSESS: CPT | Performed by: INTERNAL MEDICINE

## 2022-07-19 PROCEDURE — G0009 ADMIN PNEUMOCOCCAL VACCINE: HCPCS | Performed by: INTERNAL MEDICINE

## 2022-07-19 PROCEDURE — G8417 CALC BMI ABV UP PARAM F/U: HCPCS | Performed by: INTERNAL MEDICINE

## 2022-07-19 PROCEDURE — G8400 PT W/DXA NO RESULTS DOC: HCPCS | Performed by: INTERNAL MEDICINE

## 2022-07-19 PROCEDURE — 1101F PT FALLS ASSESS-DOCD LE1/YR: CPT | Performed by: INTERNAL MEDICINE

## 2022-07-19 PROCEDURE — 1111F DSCHRG MED/CURRENT MED MERGE: CPT | Performed by: INTERNAL MEDICINE

## 2022-07-19 PROCEDURE — 82962 GLUCOSE BLOOD TEST: CPT | Performed by: INTERNAL MEDICINE

## 2022-07-19 PROCEDURE — 90670 PCV13 VACCINE IM: CPT | Performed by: INTERNAL MEDICINE

## 2022-07-19 PROCEDURE — G8510 SCR DEP NEG, NO PLAN REQD: HCPCS | Performed by: INTERNAL MEDICINE

## 2022-07-19 PROCEDURE — 99214 OFFICE O/P EST MOD 30 MIN: CPT | Performed by: INTERNAL MEDICINE

## 2022-07-19 PROCEDURE — G8752 SYS BP LESS 140: HCPCS | Performed by: INTERNAL MEDICINE

## 2022-07-19 PROCEDURE — G8754 DIAS BP LESS 90: HCPCS | Performed by: INTERNAL MEDICINE

## 2022-07-19 PROCEDURE — G8427 DOCREV CUR MEDS BY ELIG CLIN: HCPCS | Performed by: INTERNAL MEDICINE

## 2022-07-19 PROCEDURE — G8536 NO DOC ELDER MAL SCRN: HCPCS | Performed by: INTERNAL MEDICINE

## 2022-07-19 RX ORDER — SUMATRIPTAN 50 MG/1
50 TABLET, FILM COATED ORAL
COMMUNITY

## 2022-07-19 RX ORDER — ALLOPURINOL 100 MG/1
200 TABLET ORAL DAILY
Qty: 180 TABLET | Refills: 0 | Status: SHIPPED | OUTPATIENT
Start: 2022-07-19

## 2022-07-19 NOTE — PROGRESS NOTES
1. \"Have you been to the ER, urgent care clinic since your last visit? Hospitalized since your last visit? \" Yes When: July 10,2022 Where: [facility] er Reason for visit: dizziness    2. \"Have you seen or consulted any other health care providers outside of the [address] [place] [address] since your last visit? \" No     3. For patients aged 39-70: Has the patient had a colonoscopy / FIT/ Cologuard? NA - based on age      If the patient is female:    4. For patients aged 41-77: Has the patient had a mammogram within the past 2 years? NA - based on age or sex      11. For patients aged 21-65: Has the patient had a pap smear?  NA - based on age or sex     Visit Vitals  /71 (BP 1 Location: Left arm)   Pulse 61   Temp 97.9 °F (36.6 °C) (Temporal)   Resp 16   Ht 5' 1\" (1.549 m)   Wt 235 lb 6.4 oz (106.8 kg)   SpO2 98%   BMI 44.48 kg/m²     Chief Complaint   Patient presents with    Follow Up Chronic Condition    Hypertension    Diabetes     bs 145

## 2022-07-19 NOTE — PROGRESS NOTES
Logan Nolen is a 80 y.o. female and presents with Follow Up Chronic Condition, Hypertension, and Diabetes (bs 145)    Ms. Prince Burnett came for regular follow-up. Her random blood sugar is 145 in the office. She has done her labs on 10th July. Her hemoglobin A1c is now 6.2% and she had 6.3% in April with 6.6% in January 2022 and 6.9% in June 2021. She says she has done a lot of changes in her diet. Her blood pressure is controlled after resting. Recently she had episodes of dizziness she was hospitalized and she was ruled out of cardiac etiology and she was recommended to see neurologist and having vertigo ENT specialist    She had a CT scan of the brain which was negative and she has been scheduled for MRI. MRI was not done because she is claustrophobic and she is going to see Dr. Frank Wooten again who had consulted her in hospital set up as neurology consult. She has been started on sumatriptan by Dr. Salomon Shelton.    She has been taken off from metformin sometimes causing nausea and diarrhea and also having slightly diminished renal function but now the renal function is normal with GFR more than 60 and potassium 3.6    She says that she has episode of gout when she ate unhealthy diet so recommended to take diet low in seafood in purine and I decreased her allopurinol to 200 mg/day instead of 300 mg/day and gabapentin is not helping much so recommended to stop gabapentin which can not trigger more drowsiness or dizziness at her age of 80. No history of balance problems she is walking normal no nausea. No vomiting. She had some episodes of nausea few days ago. No neurological weakness in the legs. I wanted to start her on glipizide but I will wait for another hemoglobin A1c having prediabetes    Diabetic monofilament test is negative in both feet. Prevnar 13 given in the office. She wants to have orders for mammogram that I had placed already last month    . Her BMI is 44.48.       She follows cardiologist Dr. Cadence Dumont    No shortness of breath. She has history of sarcoidosis but no flareup. She was discharged on 10th July and was admitted on 9 July 2022    She agreed to attend diabetic education classes that can help her to prevent to be on medicines    I reviewed her ER admission notes, H&P, progress note in hospital, consult notes, discharge notes and labs and imagings and EKG results. Shared with her      Review of Systems    Review of Systems   Constitutional: Negative. HENT: Negative for sinus pain and sore throat. Eyes: Negative for blurred vision. Respiratory: Negative for cough and wheezing. Cardiovascular: Negative. Gastrointestinal: Negative. Genitourinary: Negative. Musculoskeletal: Negative. Neurological: Negative for dizziness, tingling, tremors and headaches. Was admitted in hospital recently for dizziness was consulted by neurologist,Dr Andrew Buenrostro. Psychiatric/Behavioral: Negative.          Past Medical History:   Diagnosis Date    Allergic rhinitis     Angina pectoris (HCC)     Arthritis     Cardiac murmur     Chronic ischemic heart disease     Diabetes ([facility] [address]     Diverticulitis     Edema of left lower leg     GERD (gastroesophageal reflux disease)     H/O Clostridium difficile infection     Hyperlipidemia     Hypertension     Knee pain     Left tibialis posterior tendinitis     Lumbar radiculopathy     Morbid obesity (HCC)     Osteoarthritis     Sarcoidosis     Shortness of breath     Spondylosis      Past Surgical History:   Procedure Laterality Date    HX BREAST REDUCTION      30 yrs ago    HX CHOLECYSTECTOMY      HX CYST REMOVAL      HX GI  2012    hx of bowel resection from diverticulitits    IR CHOLECYSTOSTOMY PERCUTANEOUS       Social History     Socioeconomic History    Marital status:    Tobacco Use    Smoking status: Never Smoker    Smokeless tobacco: Never Used   Vaping Use    Vaping Use: Never used Substance and Sexual Activity    Alcohol use: Never    Drug use: Never     Family History   Family history unknown: Yes     Current Outpatient Medications   Medication Sig Dispense Refill    SUMAtriptan (IMITREX) 50 mg tablet Take 50 mg by mouth once as needed for Migraine.  allopurinoL (ZYLOPRIM) 100 mg tablet Take 2 Tablets by mouth daily. 180 Tablet 0    carvediloL (Coreg) 12.5 mg tablet Take 1 Tablet by mouth two (2) times daily (with meals). 180 Tablet 1    pravastatin (PRAVACHOL) 40 mg tablet Take 1 Tablet by mouth nightly. 90 Tablet 1    potassium chloride (KLOR-CON M10) 10 mEq tablet Take 1 Tablet by mouth daily. 90 Tablet 1    mesalamine (DELZICOL) 400 mg cdti capsule Take 400 mg by mouth three (3) times daily.  omeprazole (PRILOSEC) 40 mg capsule Take 1 Capsule by mouth daily. 90 Capsule 1    losartan-hydroCHLOROthiazide (HYZAAR) 100-25 mg per tablet Take 1 tablet by mouth once daily for 90 days 90 Tablet 2    albuterol (PROVENTIL HFA, VENTOLIN HFA, PROAIR HFA) 90 mcg/actuation inhaler Take 2 Puffs by inhalation every six (6) hours as needed for Wheezing. 1 Inhaler 3    aspirin 81 mg chewable tablet Take 81 mg by mouth daily.  budesonide-formoteroL (Symbicort) 80-4.5 mcg/actuation HFAA Take 2 Puffs by inhalation two (2) times a day.  fluticasone propionate (Flonase Allergy Relief) 50 mcg/actuation nasal spray 2 Sprays by Both Nostrils route daily.  isosorbide mononitrate ER (IMDUR) 30 mg tablet Take 30 mg by mouth daily.  multivitamin (ONE A DAY) tablet Take 1 Tab by mouth daily.  nitroglycerin (Nitrostat) 0.3 mg SL tablet 0.3 mg by SubLINGual route every five (5) minutes as needed for Chest Pain. Allergies   Allergen Reactions    Clindamycin Seizures and Diarrhea     Reaction Type:  Allergy       Objective:  Visit Vitals  /78 (BP 1 Location: Right upper arm, BP Patient Position: Sitting, BP Cuff Size: Adult)   Pulse 60   Temp 97.9 °F (36.6 °C) (Temporal)   Resp 16   Ht 5' 1\" (1.549 m)   Wt 235 lb 6.4 oz (106.8 kg)   SpO2 98%   BMI 44.48 kg/m²       Physical Exam:   Constitutional: General Appearance: Morbid obesity with pleasant personality. . Level of Distress: NAD. Psychiatric: Mental Status: normal mood and affect Orientation: to time, place, and person. ,normal eye contact. Head: Head: normocephalic and atraumatic. Eyes: Pupils: PERRLA. Sclerae: non-icteric. Neck: Neck: supple, trachea midline, and no masses. Lymph Nodes: no cervical LAD. Thyroid: no enlargement or nodules and non-tender. Lungs: Respiratory effort: no dyspnea. Auscultation: no wheezing, rales/crackles, or rhonchi and breath sounds normal and good air movement. Cardiovascular: Apical Impulse: not displaced. Heart Auscultation: normal S1 and S2; no murmurs, rubs, or gallops; and RRR. Neck vessels: no carotid bruits. Pulses including femoral / pedal: normal throughout. Abdomen: Bowel Sounds: normal. Inspection and Palpation: no tenderness, guarding, or masses and soft and non-distended. Liver: non-tender and no hepatomegaly. Spleen: non-tender and no splenomegaly. Musculoskeletal[de-identified] Extremities: no edema,no varicosities. No Calf tenderness. Neurologic: Gait and Station: normal gait and station. Motor Strength normal right and left. Skin: Inspection and palpation: no rash, lesions, or ulcer. Diabetic monofilament test is negative in both feet. No palpable pitting edema on both lower legs. Dorsalis pedis palpable in both feet. Results for orders placed or performed in visit on 07/19/22   AMB POC GLUCOSE BLOOD, BY GLUCOSE MONITORING DEVICE   Result Value Ref Range    Glucose  MG/DL       Assessment/Plan:      ICD-10-CM ICD-9-CM    1. Essential hypertension  A36 209.2 METABOLIC PANEL, BASIC      MICROALBUMIN, UR, RAND W/ MICROALB/CREAT RATIO   2.  Pure hypercholesterolemia  E78.00 272.0 LIPID PANEL   3. Dizziness  R42 780.4 REFERRAL TO NEUROLOGY   4. Mild intermittent asthma without complication  R04.89 576.31    5. Ischemic heart disease  I25.9 414.9    6. Prediabetes  R73.03 790.29 AMB POC GLUCOSE BLOOD, BY GLUCOSE MONITORING DEVICE      HEMOGLOBIN A1C WITH EAG      MICROALBUMIN, UR, RAND W/ MICROALB/CREAT RATIO      REFERRAL TO DIABETIC EDUCATION   7. Gastroesophageal reflux disease without esophagitis  K21.9 530.81    8. Allergic rhinitis, unspecified seasonality, unspecified trigger  J30.9 477.9    9. Chronic gout without tophus, unspecified cause, unspecified site  M1A. 9XX0 274.02 URIC ACID   10. History of sarcoidosis  Z86.2 V12.29    11. Osteoarthritis of multiple joints, unspecified osteoarthritis type  M15.9 715.89    12. Osteoarthritis of spine with radiculopathy, lumbar region  M47.26 721.3    13. Class 3 severe obesity with serious comorbidity and body mass index (BMI) of 40.0 to 44.9 in adult, unspecified obesity type ([facility]  E66.01 278.01     Z68.41 V85.41    14. Encounter for immunization  Z23 V03.89 PNEUMOCOCCAL, PCV-13, (AGE 6 WKS+), IM   15.  Screening mammogram for breast cancer  Z12.31 V76.12 KELVIN 3D GALO W MAMMO BI SCREENING INCL CAD     Orders Placed This Encounter    KELVIN 3D GALO W MAMMO BI SCREENING INCL CAD     Standing Status:   Future     Standing Expiration Date:   1/19/2023    PNEUMOCOCCAL, PCV-13, (AGE 6 WKS+), IM    METABOLIC PANEL, BASIC    URIC ACID    HEMOGLOBIN A1C WITH EAG    MICROALBUMIN, UR, RAND W/ MICROALB/CREAT RATIO    LIPID PANEL    REFERRAL TO NEUROLOGY     Referral Priority:   Routine     Referral Type:   Consultation     Referral Reason:   Specialty Services Required     Referred to Provider:   Kirk Rodriguez MD     Number of Visits Requested:   1    REFERRAL TO DIABETIC EDUCATION     Referral Priority:   Routine     Referral Type:   Consultation     Referral Reason:   Specialty Services Required     Number of Visits Requested:   1    AMB POC GLUCOSE BLOOD, BY GLUCOSE MONITORING DEVICE    SUMAtriptan (IMITREX) 50 mg tablet     Sig: Take 50 mg by mouth once as needed for Migraine.  allopurinoL (ZYLOPRIM) 100 mg tablet     Sig: Take 2 Tablets by mouth daily. Dispense:  180 Tablet     Refill:  0     Medicine reconciliation done    Hypertension after resting it is controlled continued on current medicines. Continue carvedilol 12.5 mg twice a day losartan HCTZ 100/25 mg once a day morning. Hypercholesteremia with ischemic heart disease under care of cardiologist Dr. José Antonio Sharp. Continue pravastatin 40 mg at bedtime continue aspirin 81 mg/day continue isosorbide mononitrate ER 30 mg/day continue control of blood pressure. Recently having dizziness as in HPI she was discharged on 10th July and was admitted on ninth July underwent EKG and CT scan of the brain unremarkable was consulted by neurologist who recommended to have MRI of brain which was not done I referred her to neurologist CT brain without contrast was normal.  She was prescribed sumatriptan by the neurologist Dr. Cyn Mejía during hospital set up    Today she is walking normal no ataxia. She has no vomiting today. She had some vomiting 2 to 3 days ago. She is taken off from metformin. Since 8 April 2022 having stomach discomfort. She has consulted excellent ENT specialist having history of vertigo that she had 25 years ago he also thinks that symptoms are better so do not suspect stroke and they can hold off any further imagings for now she might have a labyrinthitis of left ear with vertigo. As per her request I will refer her to neurologist for hospital discharge follow-up. Asthma with history of sarcoidosis no recurrent flareup at all she has BMI 44.48. Continue rescue inhaler.   Continue maintenance inhaler    Gout no flareup allopurinol decreased to 200 mg/day she should avoid seafood and diet rich in purine    GERD she takes omeprazole as needed    Allergic rhinitis taking fluticasone nasal spray    History of hypokalemia taking potassium 10 mill equivalent per day    Recommended to stop taking gabapentin I do not see any benefits and I do not see any risk to stop at her age I will try to minimize medicines to prevent drug interaction from polypharmacy. Patient followed all my recommendation she agreed that she will not be on any medicine like glipizide until we do blood work and she will change her dietary habits she will follow with diabetic education classes    I referred her to neurologist as per her request but I do not see any abnormality in gait. I have taken her off from metformin so she should not have GI upset. Labs ordered. I do not understand why she was prescribed [address] mine by gastroenterologist I will try to call gastroenterologist.  However she is not taking. Follow-up in 3 months for Medicare wellness visit plus follow-up for chronic medical problems    Prevnar 13 given in the office. She should get Shingrix and Tdap vaccine from the pharmacy and she could not recall who is her eye doctor. lose weight, , bring BP log to office visit, follow low fat diet, follow low salt diet, continue present plan, routine labs ordered, call if any problems    There are no Patient Instructions on file for this visit. Follow-up and Dispositions    · Return in about 3 months (around 10/19/2022) for [address] [place] [address] F/U, diabetes, hypertension,cholesterol follow up.

## 2022-07-25 ENCOUNTER — TRANSCRIBE ORDER (OUTPATIENT)
Dept: SCHEDULING | Age: 82
End: 2022-07-25

## 2022-07-25 DIAGNOSIS — Z12.31 SCREENING MAMMOGRAM FOR HIGH-RISK PATIENT: Primary | ICD-10-CM

## 2022-07-27 ENCOUNTER — HOSPITAL ENCOUNTER (OUTPATIENT)
Dept: MAMMOGRAPHY | Age: 82
Discharge: HOME | End: 2022-07-27
Attending: INTERNAL MEDICINE
Payer: MEDICARE

## 2022-07-27 DIAGNOSIS — Z12.31 SCREENING MAMMOGRAM FOR HIGH-RISK PATIENT: ICD-10-CM

## 2022-07-27 PROCEDURE — 77063 BREAST TOMOSYNTHESIS BI: CPT

## 2022-07-29 ENCOUNTER — PATIENT OUTREACH (OUTPATIENT)
Dept: CASE MANAGEMENT | Age: 82
End: 2022-07-29

## 2022-07-29 NOTE — PROGRESS NOTES
Care Transitions Follow Up Call    Challenges to be reviewed by the provider   Additional needs identified to be addressed with provider: no         Method of communication with provider : none    Care Transition Nurse (CTN) contacted the patient by telephone to follow up after admission on 2022. Verified name and  with patient as identifiers. Addressed changes since last contact: none  Follow up appointment completed? yes. Was follow up appointment scheduled within 7 days of discharge? yes. Advance Care Planning:   Does patient have an Advance Directive:  currently not on file; patient declined education    CTN reviewed discharge instructions, medical action plan and red flags with patient and discussed any barriers to care and/or understanding of plan of care after discharge. Discussed appropriate site of care based on symptoms and resources available to patient including: PCP and Specialist. The patient agrees to contact the PCP office for questions related to their healthcare. Patients top risk factors for readmission:  none noted    Interventions to address risk factors: Obtained and reviewed discharge summary and/or continuity of care documents    [facility] follow up appointment(s):   Future Appointments   Date Time Provider Wes Arguello   2022  2:00 PM Meka Alejandro MD [facility] BS AMB   2022  9:30 AM Braeden Ely MD [facility] BS AMB     Non-[facility] follow up appointment(s): none    CTN provided contact information for future needs. Plan for follow-up call in 7-10 days based on severity of symptoms and risk factors. Plan for next call:  BP       Goals Addressed                   This Visit's Progress     Attends follow-up appointments as directed.    On track     2022  -Attended follow up with PCP on   -Will follow up with ENT on   -SP  2022  -Patient attended ENT follow up on   -Will attend PCP follow up on   -CTN to follow up in 1 week  SP  2022  -Will attend PCP follow up on 7/19  -CTN to follow up in 1 week  SP

## 2022-08-16 ENCOUNTER — PATIENT OUTREACH (OUTPATIENT)
Dept: CASE MANAGEMENT | Age: 82
End: 2022-08-16

## 2022-08-16 NOTE — PROGRESS NOTES
Patient has graduated from the Transitions of Care Coordination  program on 8/16/2022. Patient/family has the ability to self-manage at this time Care management goals have been completed. Patient was not referred to the [facility] team for further management. Goals Addressed                   This Visit's Progress     COMPLETED: Attends follow-up appointments as directed. 7/29/2022  -Attended follow up with PCP on 7/19  -Will follow up with ENT on 8/30  -SP  7/18/2022  -Patient attended ENT follow up on 7/13  -Will attend PCP follow up on 7/19  -CTN to follow up in 1 week  SP  7/11/2022  -Will attend PCP follow up on 7/19  -CTN to follow up in 1 week  SP       COMPLETED: Prevent complications post hospitalization. 7/18/2022  -Patient reports that she did not receive BP cuff in the mail yet. CTN discussed with patient BP parameters. Will notify CTN if BP cuff arrives before next scheduled call to discuss proper monitoring procedure  -CTN to follow up in 1 week  SP   7/11/2022  -Daughter purchased BP cuff online. Will arrive in 2 days. Daughter to monitor BP daily and record. Will report bp >180/100 or <80/40. -CTN to follow up in 1 week  SP              Patient has Care Transition Nurse's contact information for any further questions, concerns, or needs.   Patients upcoming visits:    Future Appointments   Date Time Provider Wes Arguello   8/30/2022  2:00 PM Nasim Mata MD [facility] BS AMB   11/1/2022  9:30 AM MD MARGARITO CastroIP BS AMB

## 2022-08-18 PROBLEM — Z23 ENCOUNTER FOR IMMUNIZATION: Status: RESOLVED | Noted: 2021-01-06 | Resolved: 2022-08-18

## 2022-08-30 ENCOUNTER — OFFICE VISIT (OUTPATIENT)
Dept: ENT CLINIC | Age: 82
End: 2022-08-30
Payer: MEDICARE

## 2022-08-30 VITALS
HEIGHT: 61 IN | WEIGHT: 235 LBS | SYSTOLIC BLOOD PRESSURE: 134 MMHG | DIASTOLIC BLOOD PRESSURE: 80 MMHG | RESPIRATION RATE: 17 BRPM | OXYGEN SATURATION: 96 % | HEART RATE: 71 BPM | BODY MASS INDEX: 44.37 KG/M2

## 2022-08-30 DIAGNOSIS — G43.809 VESTIBULAR MIGRAINE: ICD-10-CM

## 2022-08-30 DIAGNOSIS — R42 VERTIGO: Primary | ICD-10-CM

## 2022-08-30 PROCEDURE — G8752 SYS BP LESS 140: HCPCS | Performed by: OTOLARYNGOLOGY

## 2022-08-30 PROCEDURE — 1123F ACP DISCUSS/DSCN MKR DOCD: CPT | Performed by: OTOLARYNGOLOGY

## 2022-08-30 PROCEDURE — G8536 NO DOC ELDER MAL SCRN: HCPCS | Performed by: OTOLARYNGOLOGY

## 2022-08-30 PROCEDURE — G8754 DIAS BP LESS 90: HCPCS | Performed by: OTOLARYNGOLOGY

## 2022-08-30 PROCEDURE — G8400 PT W/DXA NO RESULTS DOC: HCPCS | Performed by: OTOLARYNGOLOGY

## 2022-08-30 PROCEDURE — 99214 OFFICE O/P EST MOD 30 MIN: CPT | Performed by: OTOLARYNGOLOGY

## 2022-08-30 PROCEDURE — 1101F PT FALLS ASSESS-DOCD LE1/YR: CPT | Performed by: OTOLARYNGOLOGY

## 2022-08-30 PROCEDURE — G8427 DOCREV CUR MEDS BY ELIG CLIN: HCPCS | Performed by: OTOLARYNGOLOGY

## 2022-08-30 PROCEDURE — 1090F PRES/ABSN URINE INCON ASSESS: CPT | Performed by: OTOLARYNGOLOGY

## 2022-08-30 PROCEDURE — G8417 CALC BMI ABV UP PARAM F/U: HCPCS | Performed by: OTOLARYNGOLOGY

## 2022-08-30 PROCEDURE — G8510 SCR DEP NEG, NO PLAN REQD: HCPCS | Performed by: OTOLARYNGOLOGY

## 2022-08-30 RX ORDER — ALPRAZOLAM 0.25 MG/1
0.25 TABLET ORAL
Qty: 30 TABLET | Refills: 0 | Status: SHIPPED | OUTPATIENT
Start: 2022-08-30 | End: 2022-09-19

## 2022-08-30 NOTE — PROGRESS NOTES
Subjective:    Zeyad Mackenzie   80 y.o.   1940     Follow-up visit    Location - head    Quality - dizziness, headaches    Severity -  moderate    Duration - 3 days ago    Timing - sudden    Context - pt had woke up with dizziness, posterior headache, left ear tightness, lasted 3 days; she was at [facility], had negative CT scan and was scheduled for MRI but needs sedation for this; had similar spell over 25 years ago    Modifying Features - none    Associated symptoms/signs - as above    08/30/22  1 month follow-up - since last visit she had a URI was on abx and prednisone; was sinus infection, was having pain from nose to top of head, then got eyes dilated for eye exam and still feeling dizzy. She has not followed up with neurology yet. Dizzy spells lasting couple of hours, she has to sleep them off. Review of Systems  Review of Systems   Constitutional:  Negative for chills and fever. HENT:  Negative for ear pain, hearing loss, nosebleeds and tinnitus. Eyes:  Negative for blurred vision and double vision. Respiratory:  Negative for cough, sputum production and shortness of breath. Cardiovascular:  Negative for chest pain and palpitations. Gastrointestinal:  Negative for heartburn, nausea and vomiting. Musculoskeletal:  Negative for joint pain and neck pain. Skin: Negative. Neurological:  Positive for headaches. Negative for dizziness, speech change and weakness. Endo/Heme/Allergies:  Negative for environmental allergies. Does not bruise/bleed easily. Psychiatric/Behavioral:  Negative for memory loss. The patient does not have insomnia.         Past Medical History:   Diagnosis Date    Allergic rhinitis     Angina pectoris (HCC)     Arthritis     Cardiac murmur     Chronic ischemic heart disease     Diabetes ([facility] [address]     Diverticulitis     Edema of left lower leg     GERD (gastroesophageal reflux disease)     H/O Clostridium difficile infection     Hyperlipidemia     Hypertension Knee pain     Left tibialis posterior tendinitis     Lumbar radiculopathy     Morbid obesity (HCC)     Osteoarthritis     Sarcoidosis     Shortness of breath     Spondylosis      Past Surgical History:   Procedure Laterality Date    HX BREAST REDUCTION      30 yrs ago    HX CHOLECYSTECTOMY      HX CYST REMOVAL      HX GI  2012    hx of bowel resection from diverticulitits    HX HYSTERECTOMY      IR CHOLECYSTOSTOMY PERCUTANEOUS        Family History   Family history unknown: Yes     Social History     Tobacco Use    Smoking status: Never    Smokeless tobacco: Never   Substance Use Topics    Alcohol use: Never      Prior to Admission medications    Medication Sig Start Date End Date Taking? Authorizing Provider   losartan-hydroCHLOROthiazide (HYZAAR) 100-25 mg per tablet Take 1 Tablet by mouth Every morning. Take 1 tablet by mouth once daily 8/9/22   Ignacio Potts MD   SUMAtriptan (IMITREX) 50 mg tablet Take 50 mg by mouth once as needed for Migraine. Provider, Historical   allopurinoL (ZYLOPRIM) 100 mg tablet Take 2 Tablets by mouth daily. 7/19/22   Ignacio Potts MD   carvediloL (Coreg) 12.5 mg tablet Take 1 Tablet by mouth two (2) times daily (with meals). 6/9/22   Ignacio Potts MD   pravastatin (PRAVACHOL) 40 mg tablet Take 1 Tablet by mouth nightly. 5/18/22   Ignacio Potts MD   potassium chloride (KLOR-CON M10) 10 mEq tablet Take 1 Tablet by mouth daily. 5/18/22   Ignacio Potts MD   mesalamine (DELZICOL) 400 mg cdti capsule Take 400 mg by mouth three (3) times daily. Provider, Historical   omeprazole (PRILOSEC) 40 mg capsule Take 1 Capsule by mouth daily. 4/8/22   Ignacio Potts MD   albuterol (PROVENTIL HFA, VENTOLIN HFA, PROAIR HFA) 90 mcg/actuation inhaler Take 2 Puffs by inhalation every six (6) hours as needed for Wheezing. 6/15/21   Ignacio Potts MD   aspirin 81 mg chewable tablet Take 81 mg by mouth daily.     Other, MD Neda   budesonide-formoteroL (Symbicort) 80-4.5 mcg/actuation HFAA Take 2 Puffs by inhalation two (2) times a day. Neda Briceño MD   fluticasone propionate (Flonase Allergy Relief) 50 mcg/actuation nasal spray 2 Sprays by Both Nostrils route daily. Neda Briceño MD   isosorbide mononitrate ER (IMDUR) 30 mg tablet Take 30 mg by mouth daily. Neda Briceño MD   multivitamin (ONE A DAY) tablet Take 1 Tab by mouth daily. Neda Briceño MD   nitroglycerin (Nitrostat) 0.3 mg SL tablet 0.3 mg by SubLINGual route every five (5) minutes as needed for Chest Pain. Neda Briceño MD        Allergies   Allergen Reactions    Clindamycin Seizures and Diarrhea     Reaction Type: Allergy         Objective:     Visit Vitals  /80 (BP 1 Location: Left upper arm, BP Patient Position: Sitting, BP Cuff Size: Adult)   Pulse 71   Resp 17   Ht 5' 1\" (1.549 m)   Wt 235 lb (106.6 kg)   SpO2 96%   BMI 44.40 kg/m²        Physical Exam  Vitals reviewed. Constitutional:       General: She is awake. She is not in acute distress. Appearance: Normal appearance. She is well-groomed. She is obese. HENT:      Head: Normocephalic and atraumatic. Jaw: There is normal jaw occlusion. No trismus, tenderness or malocclusion. Salivary Glands: Right salivary gland is not diffusely enlarged or tender. Left salivary gland is not diffusely enlarged or tender. Right Ear: Hearing, tympanic membrane, ear canal and external ear normal.      Left Ear: Hearing, tympanic membrane, ear canal and external ear normal.      Nose: No nasal deformity, septal deviation or mucosal edema. Right Nostril: No epistaxis. Left Nostril: No epistaxis. Right Turbinates: Not enlarged, swollen or pale. Left Turbinates: Not enlarged, swollen or pale. Right Sinus: No maxillary sinus tenderness or frontal sinus tenderness. Left Sinus: No maxillary sinus tenderness or frontal sinus tenderness. Mouth/Throat:      Lips: Pink. No lesions.       Mouth: Mucous membranes are moist. No oral lesions. Dentition: Normal dentition. No dental caries. Tongue: No lesions. Palate: No mass and lesions. Pharynx: Oropharynx is clear. Uvula midline. No oropharyngeal exudate or posterior oropharyngeal erythema. Tonsils: No tonsillar exudate. 0 on the right. 0 on the left. Eyes:      General: Vision grossly intact. Extraocular Movements: Extraocular movements intact. Right eye: No nystagmus. Left eye: No nystagmus. Conjunctiva/sclera: Conjunctivae normal.      Pupils: Pupils are equal, round, and reactive to light. Neck:      Thyroid: No thyroid mass, thyromegaly or thyroid tenderness. Trachea: Trachea and phonation normal. No tracheal tenderness or tracheal deviation. Cardiovascular:      Rate and Rhythm: Normal rate and regular rhythm. Pulmonary:      Effort: Pulmonary effort is normal. No respiratory distress. Breath sounds: No stridor. Musculoskeletal:         General: No swelling or tenderness. Normal range of motion. Cervical back: No edema or erythema. Lymphadenopathy:      Cervical: No cervical adenopathy. Skin:     General: Skin is warm and dry. Findings: No lesion or rash. Neurological:      General: No focal deficit present. Mental Status: She is alert and oriented to person, place, and time. Mental status is at baseline. Cranial Nerves: Cranial nerves are intact. Coordination: Romberg sign negative. Gait: Gait is intact. Psychiatric:         Mood and Affect: Mood normal.         Behavior: Behavior normal. Behavior is cooperative. Assessment/Plan:     Encounter Diagnoses   Name Primary? Vertigo Yes    Vestibular migraine      Reviewed her hospital notes and imaging. Neg CT    Symptom relief has appeared to reach a plateau. She still having headaches too. Her CT did not show any sinusitis so I do not think her dizziness is sinus related. She may have vestibular migraines.     I will give her as needed alprazolam to use for any episodes. Follow-up with neurology. If they feel it necessary we may have to do an MRI with sedation. Follow-up me in 4 weeks. No orders of the defined types were placed in this encounter. Follow-up and Dispositions    Return in about 4 weeks (around 9/27/2022). Thank you for referring this patient,    Jw Pineda MD, [address] [facility]    [address]  [address]  [place], [place] [address]

## 2022-08-30 NOTE — LETTER
8/30/2022    Patient: Nelly Mathur   YOB: 1940   Date of Visit: 8/30/2022     Mike Phoenix MD  [address] [facility], [facility]  [address] [facility] [address]    Dear Mike Phoenix MD,      Thank you for referring Ms. Chino Hancock to [facility] [address], THROAT AND ALLERGY CARE for evaluation. My notes for this consultation are attached. If you have questions, please do not hesitate to call me. I look forward to following your patient along with you.       Sincerely,    Ever Duong MD

## 2022-10-11 ENCOUNTER — TELEPHONE (OUTPATIENT)
Dept: ENT CLINIC | Age: 82
End: 2022-10-11

## 2022-10-11 NOTE — TELEPHONE ENCOUNTER
Pt came into office intending to check in for her appt. I informed her that her appt was actually scheduled for 10/12. I informed her that Dr. Darshan Lopez did not have any available appointments this morning and would not be able to see her today. Pt requested to cancel 10/12 appt, did not give a reason. Appt was cancelled.

## 2022-10-23 PROBLEM — R42 DIZZINESS: Status: RESOLVED | Noted: 2022-07-09 | Resolved: 2022-10-23

## 2022-10-23 PROBLEM — R73.03 PREDIABETES: Status: RESOLVED | Noted: 2021-01-06 | Resolved: 2022-10-23

## 2022-10-23 PROBLEM — R60.9 PERIPHERAL EDEMA: Status: RESOLVED | Noted: 2021-09-15 | Resolved: 2022-10-23

## 2022-10-23 PROBLEM — R60.0 PERIPHERAL EDEMA: Status: RESOLVED | Noted: 2021-09-15 | Resolved: 2022-10-23

## 2022-11-01 ENCOUNTER — OFFICE VISIT (OUTPATIENT)
Dept: INTERNAL MEDICINE CLINIC | Age: 82
End: 2022-11-01
Payer: MEDICARE

## 2022-11-01 ENCOUNTER — HOSPITAL ENCOUNTER (OUTPATIENT)
Dept: GENERAL RADIOLOGY | Age: 82
Discharge: HOME | End: 2022-11-01
Payer: MEDICARE

## 2022-11-01 VITALS
SYSTOLIC BLOOD PRESSURE: 128 MMHG | HEART RATE: 60 BPM | OXYGEN SATURATION: 99 % | DIASTOLIC BLOOD PRESSURE: 72 MMHG | BODY MASS INDEX: 43.8 KG/M2 | RESPIRATION RATE: 16 BRPM | HEIGHT: 61 IN | WEIGHT: 232 LBS

## 2022-11-01 DIAGNOSIS — M1A.9XX0 CHRONIC GOUT WITHOUT TOPHUS, UNSPECIFIED CAUSE, UNSPECIFIED SITE: ICD-10-CM

## 2022-11-01 DIAGNOSIS — I25.9 ISCHEMIC HEART DISEASE: ICD-10-CM

## 2022-11-01 DIAGNOSIS — R19.06 EPIGASTRIC MASS: ICD-10-CM

## 2022-11-01 DIAGNOSIS — Z00.00 MEDICARE ANNUAL WELLNESS VISIT, SUBSEQUENT: ICD-10-CM

## 2022-11-01 DIAGNOSIS — M15.9 OSTEOARTHRITIS OF MULTIPLE JOINTS, UNSPECIFIED OSTEOARTHRITIS TYPE: ICD-10-CM

## 2022-11-01 DIAGNOSIS — K21.9 GASTROESOPHAGEAL REFLUX DISEASE WITHOUT ESOPHAGITIS: ICD-10-CM

## 2022-11-01 DIAGNOSIS — J45.20 MILD INTERMITTENT ASTHMA WITHOUT COMPLICATION: ICD-10-CM

## 2022-11-01 DIAGNOSIS — E11.9 CONTROLLED TYPE 2 DIABETES MELLITUS WITHOUT COMPLICATION, WITHOUT LONG-TERM CURRENT USE OF INSULIN (HCC): ICD-10-CM

## 2022-11-01 DIAGNOSIS — I10 ESSENTIAL HYPERTENSION: ICD-10-CM

## 2022-11-01 DIAGNOSIS — J30.9 ALLERGIC RHINITIS, UNSPECIFIED SEASONALITY, UNSPECIFIED TRIGGER: ICD-10-CM

## 2022-11-01 DIAGNOSIS — Z86.2 HISTORY OF SARCOIDOSIS: ICD-10-CM

## 2022-11-01 DIAGNOSIS — E66.01 CLASS 3 SEVERE OBESITY WITH SERIOUS COMORBIDITY AND BODY MASS INDEX (BMI) OF 40.0 TO 44.9 IN ADULT, UNSPECIFIED OBESITY TYPE (HCC): ICD-10-CM

## 2022-11-01 DIAGNOSIS — E78.00 PURE HYPERCHOLESTEROLEMIA: ICD-10-CM

## 2022-11-01 PROCEDURE — G8417 CALC BMI ABV UP PARAM F/U: HCPCS | Performed by: INTERNAL MEDICINE

## 2022-11-01 PROCEDURE — 1090F PRES/ABSN URINE INCON ASSESS: CPT | Performed by: INTERNAL MEDICINE

## 2022-11-01 PROCEDURE — 1101F PT FALLS ASSESS-DOCD LE1/YR: CPT | Performed by: INTERNAL MEDICINE

## 2022-11-01 PROCEDURE — 99214 OFFICE O/P EST MOD 30 MIN: CPT | Performed by: INTERNAL MEDICINE

## 2022-11-01 PROCEDURE — G8752 SYS BP LESS 140: HCPCS | Performed by: INTERNAL MEDICINE

## 2022-11-01 PROCEDURE — G8510 SCR DEP NEG, NO PLAN REQD: HCPCS | Performed by: INTERNAL MEDICINE

## 2022-11-01 PROCEDURE — 74018 RADEX ABDOMEN 1 VIEW: CPT

## 2022-11-01 PROCEDURE — G8400 PT W/DXA NO RESULTS DOC: HCPCS | Performed by: INTERNAL MEDICINE

## 2022-11-01 PROCEDURE — G8754 DIAS BP LESS 90: HCPCS | Performed by: INTERNAL MEDICINE

## 2022-11-01 PROCEDURE — G8536 NO DOC ELDER MAL SCRN: HCPCS | Performed by: INTERNAL MEDICINE

## 2022-11-01 PROCEDURE — G8427 DOCREV CUR MEDS BY ELIG CLIN: HCPCS | Performed by: INTERNAL MEDICINE

## 2022-11-01 PROCEDURE — G0439 PPPS, SUBSEQ VISIT: HCPCS | Performed by: INTERNAL MEDICINE

## 2022-11-01 RX ORDER — NAPROXEN 375 MG/1
375 TABLET ORAL
Qty: 30 TABLET | Refills: 0 | Status: SHIPPED | OUTPATIENT
Start: 2022-11-01

## 2022-11-01 RX ORDER — LORATADINE 10 MG/1
10 TABLET ORAL DAILY
Qty: 90 TABLET | Refills: 2 | Status: SHIPPED | OUTPATIENT
Start: 2022-11-01

## 2022-11-01 RX ORDER — CEPHALEXIN 500 MG/1
500 CAPSULE ORAL 3 TIMES DAILY
Qty: 21 CAPSULE | Refills: 0 | Status: SHIPPED | OUTPATIENT
Start: 2022-11-01

## 2022-11-01 NOTE — PATIENT INSTRUCTIONS
Medicare Wellness Visit, Female     The best way to live healthy is to have a lifestyle where you eat a well-balanced diet, exercise regularly, limit alcohol use, and quit all forms of tobacco/nicotine, if applicable. Regular preventive services are another way to keep healthy. Preventive services (vaccines, screening tests, monitoring & exams) can help personalize your care plan, which helps you manage your own care. Screening tests can find health problems at the earliest stages, when they are easiest to treat. [facility] follows the current, evidence-based guidelines published by the [facility] ([facility]) when recommending preventive services for our patients. Because we follow these guidelines, sometimes recommendations change over time as research supports it. (For example, mammograms used to be recommended annually. Even though Medicare will still pay for an annual mammogram, the newer guidelines recommend a mammogram every two years for women of average risk). Of course, you and your doctor may decide to screen more often for some diseases, based on your risk and your co-morbidities (chronic disease you are already diagnosed with). Preventive services for you include:  - Medicare offers their members a free annual wellness visit, which is time for you and your primary care provider to discuss and plan for your preventive service needs. Take advantage of this benefit every year!  -All adults over the age of 72 should receive the recommended pneumonia vaccines. Current USPSTF guidelines recommend a series of two vaccines for the best pneumonia protection.   -All adults should have a flu vaccine yearly and a tetanus vaccine every 10 years.   -All adults age 48 and older should receive the shingles vaccines (series of two vaccines).       -All adults age 38-68 who are overweight should have a diabetes screening test once every three years.   -All adults born between [birth year] and 1965 should be screened once for Hepatitis C.  -Other screening tests and preventive services for persons with diabetes include: an eye exam to screen for diabetic retinopathy, a kidney function test, a foot exam, and stricter control over your cholesterol.   -Cardiovascular screening for adults with routine risk involves an electrocardiogram (ECG) at intervals determined by your doctor.   -Colorectal cancer screenings should be done for adults age 54-65 with no increased risk factors for colorectal cancer. There are a number of acceptable methods of screening for this type of cancer. Each test has its own benefits and drawbacks. Discuss with your doctor what is most appropriate for you during your annual wellness visit. The different tests include: colonoscopy (considered the best screening method), a fecal occult blood test, a fecal DNA test, and sigmoidoscopy.    -A bone mass density test is recommended when a woman turns 65 to screen for osteoporosis. This test is only recommended one time, as a screening. Some providers will use this same test as a disease monitoring tool if you already have osteoporosis. -Breast cancer screenings are recommended every other year for women of normal risk, age 54-69.  -Cervical cancer screenings for women over age 72 are only recommended with certain risk factors.      Here is a list of your current Health Maintenance items (your personalized list of preventive services) with a due date:  Health Maintenance Due   Topic Date Due    Albumin Urine Test  Never done    Eye Exam  Never done    DTaP/Tdap/Td  (1 - Tdap) Never done    Shingles Vaccine (1 of 2) Never done    Bone Mineral Density   Never done    COVID-19 Vaccine (4 - Booster for Moderna series) 12/18/2021    Yearly Flu Vaccine (1) Never done

## 2022-11-01 NOTE — PROGRESS NOTES
This is the Subsequent Medicare Annual Wellness Exam, performed 12 months or more after the Initial AWV or the last Subsequent AWV    I have reviewed the patient's medical history in detail and updated the computerized patient record. 3 words recall and clock drawing is normal.  Complemented being independent for ADL and IADL walking unassisted at her age of 80. She has multiple comorbidities. She has good memory. No depression. No hearing or vision complaints. Wearing glasses. Does not need any assisting devices. Recommended to make advanced directory. Immunizations record reviewed and discussed with her about remaining vaccines to take at appropriate time from the pharmacy. She will need Pneumovax 23 in 2023 and she will need Shingrix and Tdap vaccine from the pharmacy and Pneumovax 23 every 5-year Tdap vaccine every 10-year and she refused for flu vaccine and she has taken booster dose of COVID-vaccine      Donavan Millan (: 1940) is a 80 y.o. female, established patient, here for evaluation of the following chief complaint(s): Annual Wellness Visit and Follow Up Chronic Condition       [facility] is aware that this is a combined visit. ASSESSMENT/PLAN:  Below is the assessment and plan developed based on review of pertinent history, physical exam, labs, studies, and medications. 1. Medicare annual wellness visit, subsequent  2. Essential hypertension  -     CBC WITH AUTOMATED DIFF  -     METABOLIC PANEL, COMPREHENSIVE  3. Controlled type 2 diabetes mellitus without complication, without long-term current use of insulin (HCC)  -     HEMOGLOBIN A1C WITH EAG  -     MICROALBUMIN, UR, RAND W/ MICROALB/CREAT RATIO  4.  Pure hypercholesterolemia  -     LIPID PANEL  5. Class 3 severe obesity with serious comorbidity and body mass index (BMI) of 40.0 to 44.9 in adult, unspecified obesity type (HCC)  6. Epigastric mass  -     CBC WITH AUTOMATED DIFF  -     METABOLIC PANEL, COMPREHENSIVE  - XR ABD (KUB); Future  7. Chronic gout without tophus, unspecified cause, unspecified site  -     URIC ACID  8. Mild intermittent asthma without complication  9. Gastroesophageal reflux disease without esophagitis  10. Ischemic heart disease  11. Allergic rhinitis, unspecified seasonality, unspecified trigger  12. Osteoarthritis of multiple joints, unspecified osteoarthritis type  13. History of sarcoidosis    Hypertension controlled. Continued carvedilol 12.5 mg twice a day. Losartan HCTZ 100/25 mg/day. Diet low in sodium. Hypercholesteremia getting pravastatin 40 mg at bedtime. Diet low in saturated fat. Her BMI is 43.84 at her age of 80. Ischemic heart disease under care of cardiologist I do not have exact cardiological diagnosis but I reviewed the chart of Dr. Kash Jackson she takes isosorbide mononitrate ER 30 mg/day along with current medications    Questionable cyst in her upper abdominal wall in the epigastrium having tenderness on deep palpation I will give Keflex and naproxen and she is already on omeprazole and naproxen should be taken only as needed I have given only 30 pills of naproxen and Keflex for 1 week and ordered plain x-ray abdomen and if she does not improve in her pain which is 7/10 on the pain scale and have some nausea she is recommended to go to ER but she prefers here then she can call if I have recommendation I can see her in CT abdomen without contrast can be ordered. Diabetes type 2 controlled she is off from medicines controlled with diet labs ordered. Diet low in starch and sugar.     She has already consulted Dr. Gene Galvan now she has no diarrhea she is off from metformin she wanted to contact Dr. Gene Galvan for her indigestion that she can refer    Asthma continue albuterol and Symbicort inhaler    Gout currently no flareup taking allopurinol 100 mg 2 pills meaning 200 mg/day    Allergies with sinus drainage she is already taking fluticasone nasal spray I have added loratadine today    GERD she takes omeprazole    History of sarcoidosis no abnormal symptoms    DJD of joints take Tylenol 500 to 650 mg once or twice a day and try to lose weight    Answered all her concerns. Refills given. Labs ordered. Follow-up in 4 months. She can visit ER if she has worsening of pain and swelling in her epigastrium I am thinking that she might have developing small furuncle in her abdominal wall muscles and subcutaneous tissue. She has tenderness on palpation and avoid tight bra. Return in about 4 months (around 3/1/2023) for diabetes, hypertension,cholesterol follow up if pain persist dr Jesse Kim can see? ??.      SUBJECTIVE/OBJECTIVE:    HPI:  Ms. Yesica Browne came for regular follow-up and for Medicare wellness visit. Her blood pressure is controlled. She is compliant for medicines. She says she is not taking any medicine from Dr. Julia Lopez. She has some pain in the epigastric region she says that she might have cyst and on deep palpation she has some tenderness. She thinks that she has indigestion and nausea because of that has started symptoms 2 weeks ago. She has consulted gastroenterologist Dr. Julia Lopez. She follows cardiologist.    She has taken Prevnar 13 in July 2022. She has taken booster dose of COVID. No card available. Review of Systems   Constitutional: Negative. HENT:  Negative for sinus pain and sore throat. Eyes:  Negative for blurred vision. Respiratory:  Negative for cough and wheezing. Cardiovascular: Negative. Gastrointestinal: Negative. Pain in the epigastrium and he feels something questionable cyst in the epigastric abdominal wall muscles for last 2 weeks. No history of injury   Genitourinary: Negative. Musculoskeletal:         DJD in multiple joints including knee joints   Neurological:  Negative for dizziness, tingling and headaches. Endo/Heme/Allergies:  Negative for polydipsia. Psychiatric/Behavioral: Negative.        Vitals: 11/01/22 0918 11/01/22 0940   BP: 122/72 128/72   Pulse: 62 60   Resp: 16    SpO2: 99%    Weight: 232 lb (105.2 kg)    Height: 5' 1\" (1.549 m)       Physical Exam  Vitals and nursing note reviewed. Constitutional:       General: She is not in acute distress. Appearance: Normal appearance. She is obese. She is not toxic-appearing. Eyes:      Pupils: Pupils are equal, round, and reactive to light. Cardiovascular:      Rate and Rhythm: Normal rate and regular rhythm. Pulses: Normal pulses. Heart sounds: Normal heart sounds. No murmur heard. Pulmonary:      Effort: Pulmonary effort is normal.      Breath sounds: Normal breath sounds. No rhonchi or rales. Abdominal:      General: Bowel sounds are normal. There is no distension. Palpations: Abdomen is soft. Tenderness: There is no right CVA tenderness or guarding. Comments: Hard swelling felt in the epigastric region and having tenderness on deep palpation. No redness outside. No rash outside on the skin. Musculoskeletal:      Cervical back: No tenderness. Right lower leg: No edema. Left lower leg: No edema. Lymphadenopathy:      Cervical: No cervical adenopathy. Skin:     General: Skin is warm. Neurological:      General: No focal deficit present. Mental Status: She is oriented to person, place, and time. Gait: Gait normal.   Psychiatric:         Mood and Affect: Mood normal.         Behavior: Behavior normal.       On this date 11/01/2022 I have spent 45 minutes reviewing previous notes, test results and face to face with the patient discussing the diagnosis and importance of compliance with the treatment plan as well as documenting on the day of the visit. Signed by:  Amarilis Padron MD  Education and counseling provided:  Are appropriate based on today's review and evaluation    1. Medicare annual wellness visit, subsequent  2.  Essential hypertension  -     CBC WITH AUTOMATED DIFF  -     METABOLIC PANEL, COMPREHENSIVE  3. Controlled type 2 diabetes mellitus without complication, without long-term current use of insulin (HCC)  -     HEMOGLOBIN A1C WITH EAG  -     MICROALBUMIN, UR, RAND W/ MICROALB/CREAT RATIO  4. Pure hypercholesterolemia  -     LIPID PANEL  5. Class 3 severe obesity with serious comorbidity and body mass index (BMI) of 40.0 to 44.9 in adult, unspecified obesity type (HCC)  6. Epigastric mass  -     CBC WITH AUTOMATED DIFF  -     METABOLIC PANEL, COMPREHENSIVE  -     XR ABD (KUB); Future  7. Chronic gout without tophus, unspecified cause, unspecified site  -     URIC ACID  8. Mild intermittent asthma without complication  9. Gastroesophageal reflux disease without esophagitis  10. Ischemic heart disease  11. Allergic rhinitis, unspecified seasonality, unspecified trigger  12. Osteoarthritis of multiple joints, unspecified osteoarthritis type  13.  History of sarcoidosis       Depression Risk Factor Screening     3 most recent PHQ Screens 11/1/2022   Little interest or pleasure in doing things Not at all   Feeling down, depressed, irritable, or hopeless Not at all   Total Score PHQ 2 0   Trouble falling or staying asleep, or sleeping too much Not at all   Feeling tired or having little energy Not at all   Poor appetite, weight loss, or overeating Not at all   Feeling bad about yourself - or that you are a failure or have let yourself or your family down Not at all   Trouble concentrating on things such as school, work, reading, or watching TV Not at all   Moving or speaking so slowly that other people could have noticed; or the opposite being so fidgety that others notice Not at all   Thoughts of being better off dead, or hurting yourself in some way Not at all   PHQ 9 Score 0   How difficult have these problems made it for you to do your work, take care of your home and get along with others Not difficult at all       Alcohol & Drug Abuse Risk Screen    Do you average more than 1 drink per night or more than 7 drinks a week:  No    On any one occasion in the past three months have you have had more than 3 drinks containing alcohol:  No          Functional Ability and Level of Safety    Hearing: Hearing is good. Activities of Daily Living: The home contains: handrails, grab bars, and rugs  Patient does total self care      Ambulation: with no difficulty     Fall Risk:  Fall Risk Assessment, last 12 mths 11/1/2022   Able to walk? Yes   Fall in past 12 months? 0   Do you feel unsteady?  0   Are you worried about falling 0      Abuse Screen:  Patient is not abused       Cognitive Screening    Has your family/caregiver stated any concerns about your memory: no     Cognitive Screening: Normal - MMSE (Mini Mental Status Exam), Clock Drawing Test    Health Maintenance Due     Health Maintenance Due   Topic Date Due    MICROALBUMIN Q1  Never done    Eye Exam Retinal or Dilated  Never done    DTaP/Tdap/Td series (1 - Tdap) Never done    Shingrix Vaccine Age 50> (1 of 2) Never done    Bone Densitometry (Dexa) Screening  Never done    COVID-19 Vaccine (4 - Booster for [place] Ser series) 12/18/2021       Patient Care Team   Patient Care Team:  Ede Andino MD as PCP - General (Internal Medicine Physician)  Ede Andino MD as PCP - [facility] Empaneled Provider  Ede Andino MD (Internal Medicine Physician)  Anton Mart MD (Ophthalmology)    History     Patient Active Problem List   Diagnosis Code    Chronic pain of left ankle M25.572, G89.29    Onychomycosis B35.1    Pure hypercholesterolemia E78.00    Essential hypertension I10    Lumbar radiculopathy M54.16    Gastroesophageal reflux disease without esophagitis K21.9    Ischemic heart disease I25.9    Allergic rhinitis, unspecified seasonality, unspecified trigger J30.9    Osteoarthritis of multiple joints, unspecified osteoarthritis type M15.9    Class 3 severe obesity with serious comorbidity and body mass index (BMI) of 40.0 to 44.9 in adult, unspecified obesity type ([facility] E66.01, Z68.41    Mild intermittent asthma without complication J89.18    Chronic gout without tophus, unspecified cause, unspecified site M1A. 9XX0    Controlled type 2 diabetes mellitus without complication, without long-term current use of insulin (HCC) E11.9    Venous insufficiency of both lower extremities I87.2    History of sarcoidosis Z86.2    Menopausal and postmenopausal disorder N95.9    Need for hepatitis C screening test Z11.59    Vertigo R42    Osteoarthritis of spine with radiculopathy, lumbar region M47.26     Past Medical History:   Diagnosis Date    Allergic rhinitis     Angina pectoris (HCC)     Arthritis     Cardiac murmur     Chronic ischemic heart disease     Diabetes ([facility]     Diverticulitis     Edema of left lower leg     GERD (gastroesophageal reflux disease)     H/O Clostridium difficile infection     Hyperlipidemia     Hypertension     Knee pain     Left tibialis posterior tendinitis     Lumbar radiculopathy     Morbid obesity (HCC)     Osteoarthritis     Sarcoidosis     Shortness of breath     Spondylosis       Past Surgical History:   Procedure Laterality Date    HX BREAST REDUCTION      30 yrs ago    HX CHOLECYSTECTOMY      HX CYST REMOVAL      HX GI  2012    hx of bowel resection from diverticulitits    HX HYSTERECTOMY      IR CHOLECYSTOSTOMY PERCUTANEOUS       Current Outpatient Medications   Medication Sig Dispense Refill    cephALEXin (KEFLEX) 500 mg capsule Take 1 Capsule by mouth three (3) times daily. 21 Capsule 0    naproxen (NAPROSYN) 375 mg tablet Take 1 Tablet by mouth two (2) times daily as needed for Pain. 30 Tablet 0    loratadine (CLARITIN) 10 mg tablet Take 1 Tablet by mouth daily. 90 Tablet 2    losartan-hydroCHLOROthiazide (HYZAAR) 100-25 mg per tablet Take 1 Tablet by mouth Every morning. Take 1 tablet by mouth once daily 90 Tablet 1    SUMAtriptan (IMITREX) 50 mg tablet Take 50 mg by mouth once as needed for Migraine. allopurinoL (ZYLOPRIM) 100 mg tablet Take 2 Tablets by mouth daily. 180 Tablet 0    carvediloL (Coreg) 12.5 mg tablet Take 1 Tablet by mouth two (2) times daily (with meals). 180 Tablet 1    pravastatin (PRAVACHOL) 40 mg tablet Take 1 Tablet by mouth nightly. 90 Tablet 1    potassium chloride (KLOR-CON M10) 10 mEq tablet Take 1 Tablet by mouth daily. 90 Tablet 1    omeprazole (PRILOSEC) 40 mg capsule Take 1 Capsule by mouth daily. 90 Capsule 1    albuterol (PROVENTIL HFA, VENTOLIN HFA, PROAIR HFA) 90 mcg/actuation inhaler Take 2 Puffs by inhalation every six (6) hours as needed for Wheezing. 1 Inhaler 3    aspirin 81 mg chewable tablet Take 81 mg by mouth daily. budesonide-formoteroL (SYMBICORT) 80-4.5 mcg/actuation HFAA Take 2 Puffs by inhalation two (2) times a day. fluticasone propionate (FLONASE) 50 mcg/actuation nasal spray 2 Sprays by Both Nostrils route daily. isosorbide mononitrate ER (IMDUR) 30 mg tablet Take 30 mg by mouth daily. multivitamin (ONE A DAY) tablet Take 1 Tab by mouth daily. nitroglycerin (NITROSTAT) 0.3 mg SL tablet 0.3 mg by SubLINGual route every five (5) minutes as needed for Chest Pain. Allergies   Allergen Reactions    Clindamycin Seizures and Diarrhea     Reaction Type: Allergy       Family History   Family history unknown: Yes     Social History     Tobacco Use    Smoking status: Never    Smokeless tobacco: Never   Substance Use Topics    Alcohol use: Never     Chief Complaint   Patient presents with    Annual Wellness Visit    Follow Up Chronic Condition         Visit Vitals  /72 (BP 1 Location: Right upper arm, BP Patient Position: Sitting, BP Cuff Size: Large adult)   Pulse 60   Resp 16   Ht 5' 1\" (1.549 m)   Wt 232 lb (105.2 kg)   SpO2 99%   BMI 43.84 kg/m²     1. \"Have you been to the ER, urgent care clinic since your last visit? Hospitalized since your last visit? \" No    2.  \"Have you seen or consulted any other health care providers outside of the [facility] since your last visit? \" No     3. For patients aged 39-70: Has the patient had a colonoscopy / FIT/ Cologuard? NA - based on age      If the patient is female:    4. For patients aged 41-77: Has the patient had a mammogram within the past 2 years? NA - based on age or sex      11. For patients aged 21-65: Has the patient had a pap smear?  NA - based on age or sex      Reviewed by and signed by,    Bev Ward MD.

## 2022-11-02 LAB
ALBUMIN SERPL-MCNC: 4.8 G/DL (ref 3.6–4.6)
ALBUMIN/CREAT UR: 9 MG/G CREAT (ref 0–29)
ALBUMIN/GLOB SERPL: 2.2 {RATIO} (ref 1.2–2.2)
ALP SERPL-CCNC: 48 IU/L (ref 44–121)
ALT SERPL-CCNC: 16 IU/L (ref 0–32)
AST SERPL-CCNC: 23 IU/L (ref 0–40)
BASOPHILS # BLD AUTO: 0 X10E3/UL (ref 0–0.2)
BASOPHILS NFR BLD AUTO: 0 %
BILIRUB SERPL-MCNC: 0.4 MG/DL (ref 0–1.2)
BUN SERPL-MCNC: 15 MG/DL (ref 8–27)
BUN/CREAT SERPL: 13 (ref 12–28)
CALCIUM SERPL-MCNC: 10 MG/DL (ref 8.7–10.3)
CHLORIDE SERPL-SCNC: 99 MMOL/L (ref 96–106)
CHOLEST SERPL-MCNC: 178 MG/DL (ref 100–199)
CO2 SERPL-SCNC: 30 MMOL/L (ref 20–29)
CREAT SERPL-MCNC: 1.12 MG/DL (ref 0.57–1)
CREAT UR-MCNC: 172.4 MG/DL
EGFR: 49 ML/MIN/1.73
EOSINOPHIL # BLD AUTO: 0.3 X10E3/UL (ref 0–0.4)
EOSINOPHIL NFR BLD AUTO: 3 %
ERYTHROCYTE [DISTWIDTH] IN BLOOD BY AUTOMATED COUNT: 14.8 % (ref 11.7–15.4)
EST. AVERAGE GLUCOSE BLD GHB EST-MCNC: 140 MG/DL
GLOBULIN SER CALC-MCNC: 2.2 G/DL (ref 1.5–4.5)
GLUCOSE SERPL-MCNC: 108 MG/DL (ref 70–99)
HBA1C MFR BLD: 6.5 % (ref 4.8–5.6)
HCT VFR BLD AUTO: 42.4 % (ref 34–46.6)
HDLC SERPL-MCNC: 58 MG/DL
HGB BLD-MCNC: 13.4 G/DL (ref 11.1–15.9)
IMM GRANULOCYTES # BLD AUTO: 0 X10E3/UL (ref 0–0.1)
IMM GRANULOCYTES NFR BLD AUTO: 0 %
LDLC SERPL CALC-MCNC: 103 MG/DL (ref 0–99)
LYMPHOCYTES # BLD AUTO: 2.8 X10E3/UL (ref 0.7–3.1)
LYMPHOCYTES NFR BLD AUTO: 25 %
MCH RBC QN AUTO: 24.5 PG (ref 26.6–33)
MCHC RBC AUTO-ENTMCNC: 31.6 G/DL (ref 31.5–35.7)
MCV RBC AUTO: 77 FL (ref 79–97)
MICROALBUMIN UR-MCNC: 15.2 UG/ML
MONOCYTES # BLD AUTO: 0.8 X10E3/UL (ref 0.1–0.9)
MONOCYTES NFR BLD AUTO: 7 %
NEUTROPHILS # BLD AUTO: 7.2 X10E3/UL (ref 1.4–7)
NEUTROPHILS NFR BLD AUTO: 65 %
PLATELET # BLD AUTO: 314 X10E3/UL (ref 150–450)
POTASSIUM SERPL-SCNC: 4 MMOL/L (ref 3.5–5.2)
PROT SERPL-MCNC: 7 G/DL (ref 6–8.5)
RBC # BLD AUTO: 5.48 X10E6/UL (ref 3.77–5.28)
SODIUM SERPL-SCNC: 143 MMOL/L (ref 134–144)
TRIGL SERPL-MCNC: 92 MG/DL (ref 0–149)
URATE SERPL-MCNC: 5.2 MG/DL (ref 3.1–7.9)
VLDLC SERPL CALC-MCNC: 17 MG/DL (ref 5–40)
WBC # BLD AUTO: 11.2 X10E3/UL (ref 3.4–10.8)

## 2022-11-02 NOTE — PROGRESS NOTES
Please inform Ms. Marcos Cooper that she has elevated white cell count I have already given her Keflex and her kidney function is almost same potassium normal liver enzymes normal and cholesterol readings are good and diabetes is on gray zone like between prediabetes and diabetes on 6.5 it is increased from 6.2 because she could not tolerate metformin that was given so she has to stop eating starch and sugar in the diet and in next visit also we will monitor if it increases she should be on some kind of medicines other than metformin.     Her white cell count if she has no improvement in pain or swelling with antibiotics and pain medicine she should make appointment with surgeon Sabino Lennon, she had pain and swelling in the epigastric region on palpation but not outside felt

## 2022-11-02 NOTE — PROGRESS NOTES
Please call Ms.  Bina Snow did x-ray does not show any serious findings so if she has persistent pain and swelling she should be seen by surgeon  if she has no improvement with antibiotics and pain medicine that I have given her

## 2022-11-27 RX ORDER — PRAVASTATIN SODIUM 40 MG/1
40 TABLET ORAL
Qty: 90 TABLET | Refills: 1 | Status: SHIPPED | OUTPATIENT
Start: 2022-11-27

## 2022-12-23 RX ORDER — CARVEDILOL 12.5 MG/1
TABLET ORAL
Qty: 180 TABLET | Refills: 0 | Status: SHIPPED | OUTPATIENT
Start: 2022-12-23

## 2022-12-23 RX ORDER — POTASSIUM CHLORIDE 750 MG/1
TABLET, EXTENDED RELEASE ORAL
Qty: 90 TABLET | Refills: 0 | Status: SHIPPED | OUTPATIENT
Start: 2022-12-23

## 2023-02-26 PROBLEM — Z11.59 NEED FOR HEPATITIS C SCREENING TEST: Status: RESOLVED | Noted: 2022-04-06 | Resolved: 2023-02-26

## 2023-03-03 ENCOUNTER — OFFICE VISIT (OUTPATIENT)
Dept: INTERNAL MEDICINE CLINIC | Age: 83
End: 2023-03-03

## 2023-03-03 VITALS
SYSTOLIC BLOOD PRESSURE: 138 MMHG | WEIGHT: 243.8 LBS | HEART RATE: 80 BPM | TEMPERATURE: 98.5 F | DIASTOLIC BLOOD PRESSURE: 78 MMHG | RESPIRATION RATE: 18 BRPM | HEIGHT: 61 IN | BODY MASS INDEX: 46.03 KG/M2 | OXYGEN SATURATION: 96 %

## 2023-03-03 DIAGNOSIS — E78.00 PURE HYPERCHOLESTEROLEMIA: ICD-10-CM

## 2023-03-03 DIAGNOSIS — M1A.9XX0 CHRONIC GOUT WITHOUT TOPHUS, UNSPECIFIED CAUSE, UNSPECIFIED SITE: ICD-10-CM

## 2023-03-03 DIAGNOSIS — I25.9 ISCHEMIC HEART DISEASE: ICD-10-CM

## 2023-03-03 DIAGNOSIS — E11.9 CONTROLLED TYPE 2 DIABETES MELLITUS WITHOUT COMPLICATION, WITHOUT LONG-TERM CURRENT USE OF INSULIN (HCC): ICD-10-CM

## 2023-03-03 DIAGNOSIS — J45.20 MILD INTERMITTENT ASTHMA WITHOUT COMPLICATION: ICD-10-CM

## 2023-03-03 DIAGNOSIS — R14.0 BLOATING: ICD-10-CM

## 2023-03-03 DIAGNOSIS — K21.9 GASTROESOPHAGEAL REFLUX DISEASE WITHOUT ESOPHAGITIS: ICD-10-CM

## 2023-03-03 DIAGNOSIS — I10 ESSENTIAL HYPERTENSION: Primary | ICD-10-CM

## 2023-03-03 DIAGNOSIS — E66.01 OBESITY, CLASS III, BMI 40-49.9 (MORBID OBESITY) (HCC): ICD-10-CM

## 2023-03-03 DIAGNOSIS — J30.9 ALLERGIC RHINITIS, UNSPECIFIED SEASONALITY, UNSPECIFIED TRIGGER: ICD-10-CM

## 2023-03-03 DIAGNOSIS — Z86.2 HISTORY OF SARCOIDOSIS: ICD-10-CM

## 2023-03-03 DIAGNOSIS — M15.9 OSTEOARTHRITIS OF MULTIPLE JOINTS, UNSPECIFIED OSTEOARTHRITIS TYPE: ICD-10-CM

## 2023-03-03 DIAGNOSIS — R06.83 SNORING: ICD-10-CM

## 2023-03-03 PROBLEM — E66.813 OBESITY, CLASS III, BMI 40-49.9 (MORBID OBESITY) (HCC): Status: ACTIVE | Noted: 2021-01-06

## 2023-03-03 LAB
GLUCOSE POC: 109 MG/DL
HBA1C MFR BLD HPLC: 6.7 %

## 2023-03-03 RX ORDER — BUDESONIDE AND FORMOTEROL FUMARATE DIHYDRATE 80; 4.5 UG/1; UG/1
2 AEROSOL RESPIRATORY (INHALATION) 2 TIMES DAILY
Qty: 10.2 G | Refills: 5 | Status: SHIPPED | OUTPATIENT
Start: 2023-03-03

## 2023-03-03 NOTE — PROGRESS NOTES
Christy Puentes (: 1940) is a 80 y.o. female, established patient, here for evaluation of the following chief complaint(s):  Follow Up Chronic Condition, Hypertension, GERD, Diabetes, Arthritis, Asthma, Cholesterol Problem, Sarcoidosis, and Gout         ASSESSMENT/PLAN:  Below is the assessment and plan developed based on review of pertinent history, physical exam, labs, studies, and medications. 1. Essential hypertension  -     CBC WITH AUTOMATED DIFF  -     METABOLIC PANEL, COMPREHENSIVE  2. Pure hypercholesterolemia  3. Controlled type 2 diabetes mellitus without complication, without long-term current use of insulin (HCC)  -     AMB POC GLUCOSE BLOOD, BY GLUCOSE MONITORING DEVICE  -     AMB POC HEMOGLOBIN A1C  4. Chronic gout without tophus, unspecified cause, unspecified site  5. Mild intermittent asthma without complication  6. Gastroesophageal reflux disease without esophagitis  7. Ischemic heart disease  8. Bloating  -     REFERRAL TO GASTROENTEROLOGY  9. Allergic rhinitis, unspecified seasonality, unspecified trigger  10. History of sarcoidosis  11. Obesity, Class III, BMI 40-49.9 (morbid obesity) ([facility] [address]  12. Snoring  -     REFERRAL TO PULMONARY DISEASE  13. Osteoarthritis of multiple joints, unspecified osteoarthritis type      Hypertension slightly running on upper side of normal range but after resting it was. .. Getting better so I will continue the same dose carvedilol 12.5 mg twice a day, losartan/HCTZ 100/25 mg/day diet low in sodium. Ischemic heart disease patient follows Dr. Jose Daniel Del Rio and she has been scheduled for stress test and echocardiogram in the next few days. She is on isosorbide mononitrate ER 30 mg/day and control of blood pressure and statin pravastatin 40 mg at bedtime. And she takes nitroglycerin only as needed no anginal chest pain. Also takes low-dose aspirin.     Complaining of gas and bloating and she says she does not need that with lactose-containing diet and she has gas and bloating immediately after eating but no nausea vomiting. No abdominal pain. No change in bowel habits. She wants to be seen by gastroenterologist.  I gave her educational brochures and recommended to keep the food diary. And told her to take OTC align. .    Previously I thought that she has side effects from metformin so she is off from metformin is still she has continues complaints of bloating and gas. Referred her to gastroenterologist that she will make appointment. She is already on omeprazole. Gout taking allopurinol. Having history of sarcoidosis and having asthma and chronic shortness of breath and under care of cardiologist and referred her to pulmonologist and already taking rescue and maintenance inhaler and refills given for Symbicort. Type 2 diabetes mellitus with hemoglobin A1c 6.7% increase from 6.5% random blood sugar normal started on Jardiance 10 mg/day side effects discussed she has a heart disease so it will be beneficial.  If any side effects of UTI or yeast infection she will let me know. She could not tolerate metformin. Random blood sugar is 109 in the office. Having BMI of 46.07 she says she has made changes in her diet  She needs to lose weight with healthy lifestyle. Hypokalemia taking potassium 10 mill equivalent once a day. Other labs were done in November 2022. Hemoglobin A1c increased to 6.7% from 6.5% in November and 6.2% in last July 2022. Take diet low in starch and sugar. Labs ordered. Also her granddaughter has noticed that she is snoring so referred her to pulmonary for ruling out sleep apnea. She has no depression. 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 She had office visit with cardiologist Dr. Mike Faye in January 2023, I reviewed the notes. 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 She says she has smade changes in her diet. Educational brochures given.     Return in about 3 months (around 6/3/2023) for diabetes, hypertension,cholesterol follow up. SUBJECTIVE/OBJECTIVE:  ALONZO Pfeiffer With pleasant personality came for regular follow-up has recently visited cardiologist Dr. Qamar Terrazas and going to have stress test and echo having ischemic heart disease and s she is having chronic shortness of breath. She says that she has made changes in her diet. Today her hemoglobin A1c increased to 6.7% in the past she could not tolerate metformin due to GI side effects and I had taken her off but still she has continues having complaints of gas after eating and bloating. No nausea vomiting. I am not sure if she has hiatal hernia. She takes omeprazole. No flareup of gout. No depression. She has DJD in the lumbar spine. She is physically not that much active. She had history of sarcoidosis in the past.  She does snore. Referred her to pulmonologist to rule out sleep apnea going to have stress test and echo and referred her to gastroenterologist and she does not have change in bowel habits. Her blood pressure is well controlled with current medication. Random blood sugar 109. Started on Jardiance after discussing all the side effects and benefits having cardiac problem. It is risky to start hypoglycemic agent like glipizide or glimepiride which can lead to severe hypoglycemia. Allergies   Allergen Reactions    Clindamycin Seizures and Diarrhea     Reaction Type: Allergy     Current Outpatient Medications   Medication Sig    budesonide-formoteroL (SYMBICORT) 80-4.5 mcg/actuation HFAA Take 2 Puffs by inhalation two (2) times a day. empagliflozin (Jardiance) 10 mg tablet Take 1 Tablet by mouth Daily (before breakfast). losartan-hydroCHLOROthiazide (HYZAAR) 100-25 mg per tablet Take 1 Tablet by mouth Every morning.  in the morning    carvediloL (COREG) 12.5 mg tablet TAKE 1 TABLET BY MOUTH TWICE DAILY WITH MEALS    potassium chloride (KLOR-CON M10) 10 mEq tablet Take 1 tablet by mouth once daily    pravastatin (PRAVACHOL) 40 mg tablet Take 1 tablet by mouth nightly    allopurinoL (ZYLOPRIM) 100 mg tablet Take 2 Tablets by mouth daily. loratadine (CLARITIN) 10 mg tablet Take 1 Tablet by mouth daily. omeprazole (PRILOSEC) 40 mg capsule Take 1 Capsule by mouth daily. albuterol (PROVENTIL HFA, VENTOLIN HFA, PROAIR HFA) 90 mcg/actuation inhaler Take 2 Puffs by inhalation every six (6) hours as needed for Wheezing. aspirin 81 mg chewable tablet Take 81 mg by mouth daily. fluticasone propionate (FLONASE) 50 mcg/actuation nasal spray 2 Sprays by Both Nostrils route daily. isosorbide mononitrate ER (IMDUR) 30 mg tablet Take 30 mg by mouth daily. multivitamin (ONE A DAY) tablet Take 1 Tab by mouth daily. nitroglycerin (NITROSTAT) 0.3 mg SL tablet 0.3 mg by SubLINGual route every five (5) minutes as needed for Chest Pain. No current facility-administered medications for this visit. Past Medical History:   Diagnosis Date    Allergic rhinitis     Angina pectoris (HCC)     Arthritis     Cardiac murmur     Chronic ischemic heart disease     Diabetes ([facility] [address]     Diverticulitis     Edema of left lower leg     GERD (gastroesophageal reflux disease)     H/O Clostridium difficile infection     Hyperlipidemia     Hypertension     Knee pain     Left tibialis posterior tendinitis     Lumbar radiculopathy     Morbid obesity (HCC)     Osteoarthritis     Sarcoidosis     Shortness of breath     Spondylosis      Past Surgical History:   Procedure Laterality Date    HX BREAST REDUCTION      30 yrs ago    HX CHOLECYSTECTOMY      HX CYST REMOVAL      HX GI  2012    hx of bowel resection from diverticulitits    HX HYSTERECTOMY      IR CHOLECYSTOSTOMY PERCUTANEOUS       Family History   Family history unknown: Yes     Social History     Tobacco Use   Smoking Status Never   Smokeless Tobacco Never             Review of Systems   Constitutional: Negative.     HENT:  Negative for congestion, nosebleeds, sore throat and tinnitus. Eyes:  Negative for blurred vision. Respiratory:  Negative for cough, hemoptysis, sputum production and wheezing. Cardiovascular: Negative. Gastrointestinal: Negative. Negative for abdominal pain, blood in stool, constipation, diarrhea, melena, nausea and vomiting. Lot of gas after eating   Genitourinary: Negative. Musculoskeletal: Negative. DJD in the lumbar spine   Neurological:  Negative for dizziness, tingling, sensory change and headaches. Endo/Heme/Allergies:  Negative for polydipsia. Does not bruise/bleed easily. Psychiatric/Behavioral: Negative. Vitals:    03/03/23 1201 03/03/23 1232 03/03/23 1235   BP: (!) 153/78 138/80 138/78   Pulse: 81  80   Resp: 18     Temp: 98.5 °F (36.9 °C)     TempSrc: Oral     SpO2: 96%     Weight: 243 lb 12.8 oz (110.6 kg)     Height: 5' 1\" (1.549 m)            Physical Exam  Vitals and nursing note reviewed. Constitutional:       General: She is not in acute distress. Appearance: Normal appearance. She is obese. She is not ill-appearing or toxic-appearing. Eyes:      Pupils: Pupils are equal, round, and reactive to light. Cardiovascular:      Rate and Rhythm: Normal rate and regular rhythm. Pulses: Normal pulses. Heart sounds: Normal heart sounds. No murmur heard. Pulmonary:      Effort: Pulmonary effort is normal.      Breath sounds: Normal breath sounds. No rhonchi or rales. Abdominal:      General: Bowel sounds are normal. There is no distension. Palpations: Abdomen is soft. Tenderness: There is no abdominal tenderness. There is no guarding. Musculoskeletal:         General: No swelling or tenderness. Cervical back: Neck supple. Right lower leg: No edema. Left lower leg: No edema. Neurological:      General: No focal deficit present. Mental Status: She is alert and oriented to person, place, and time. Mental status is at baseline. Cranial Nerves: No cranial nerve deficit. Motor: No weakness. Gait: Gait normal.   Psychiatric:         Mood and Affect: Mood normal.         Behavior: Behavior normal.       On this date 03/03/2023 I have spent 40 minutes reviewing previous notes, test results and face to face with the patient discussing the diagnosis and importance of compliance with the treatment plan as well as documenting on the day of the visit. An electronic signature was used to authenticate this note.   -- Lori Greenfield MD

## 2023-03-03 NOTE — PROGRESS NOTES
1. \"Have you been to the ER, urgent care clinic since your last visit? Hospitalized since your last visit? \" No    2. \"Have you seen or consulted any other health care providers outside of the [address] [place] [address] since your last visit? \" Yes When: 1 month ago Where: Cardiologist  Reason for visit: follow up      3. For patients aged 39-70: Has the patient had a colonoscopy / FIT/ Cologuard? NA - based on age      If the patient is female:    4. For patients aged 41-77: Has the patient had a mammogram within the past 2 years? NA - based on age or sex      11. For patients aged 21-65: Has the patient had a pap smear?  NA - based on age or sex    Chief Complaint   Patient presents with    Follow Up Chronic Condition    Hypertension    GERD    Diabetes    Arthritis    Asthma    Cholesterol Problem    Sarcoidosis    Gout     Visit Vitals  BP (!) 153/78 (BP 1 Location: Left upper arm, BP Patient Position: Sitting, BP Cuff Size: Adult)   Pulse 81   Temp 98.5 °F (36.9 °C) (Oral)   Resp 18   Ht 5' 1\" (1.549 m)   Wt 243 lb 12.8 oz (110.6 kg)   SpO2 96%   BMI 46.07 kg/m²

## 2023-03-21 RX ORDER — CARVEDILOL 12.5 MG/1
12.5 TABLET ORAL 2 TIMES DAILY WITH MEALS
Qty: 180 TABLET | Refills: 2 | Status: SHIPPED | OUTPATIENT
Start: 2023-03-21

## 2023-03-28 RX ORDER — POTASSIUM CHLORIDE 750 MG/1
10 TABLET, EXTENDED RELEASE ORAL DAILY
Qty: 90 TABLET | Refills: 1 | Status: SHIPPED | OUTPATIENT
Start: 2023-03-28

## 2023-03-28 NOTE — TELEPHONE ENCOUNTER
[address] faxed refill request for the following medication:      potassium chloride (KLOR-CON M10) 10 mEq tablet  QTY: 90  Take 1 tablet by mouth once daily

## 2023-04-21 DIAGNOSIS — Z12.31 VISIT FOR SCREENING MAMMOGRAM: Primary | ICD-10-CM

## 2023-06-02 ENCOUNTER — OFFICE VISIT (OUTPATIENT)
Facility: CLINIC | Age: 83
End: 2023-06-02

## 2023-06-02 VITALS
RESPIRATION RATE: 18 BRPM | BODY MASS INDEX: 45.42 KG/M2 | HEIGHT: 61 IN | TEMPERATURE: 98.5 F | DIASTOLIC BLOOD PRESSURE: 72 MMHG | OXYGEN SATURATION: 97 % | WEIGHT: 240.6 LBS | SYSTOLIC BLOOD PRESSURE: 128 MMHG | HEART RATE: 72 BPM

## 2023-06-02 DIAGNOSIS — I25.9 ISCHEMIC HEART DISEASE: ICD-10-CM

## 2023-06-02 DIAGNOSIS — Z13.820 ENCOUNTER FOR OSTEOPOROSIS SCREENING IN ASYMPTOMATIC POSTMENOPAUSAL PATIENT: ICD-10-CM

## 2023-06-02 DIAGNOSIS — I87.2 VENOUS INSUFFICIENCY OF BOTH LOWER EXTREMITIES: ICD-10-CM

## 2023-06-02 DIAGNOSIS — N95.9 MENOPAUSAL AND POSTMENOPAUSAL DISORDER: ICD-10-CM

## 2023-06-02 DIAGNOSIS — E11.9 CONTROLLED TYPE 2 DIABETES MELLITUS WITHOUT COMPLICATION, WITHOUT LONG-TERM CURRENT USE OF INSULIN (HCC): ICD-10-CM

## 2023-06-02 DIAGNOSIS — M54.50 ACUTE BILATERAL LOW BACK PAIN WITHOUT SCIATICA: ICD-10-CM

## 2023-06-02 DIAGNOSIS — M47.26 OSTEOARTHRITIS OF SPINE WITH RADICULOPATHY, LUMBAR REGION: ICD-10-CM

## 2023-06-02 DIAGNOSIS — M1A.9XX0 CHRONIC GOUT WITHOUT TOPHUS, UNSPECIFIED CAUSE, UNSPECIFIED SITE: ICD-10-CM

## 2023-06-02 DIAGNOSIS — I10 ESSENTIAL HYPERTENSION: Primary | ICD-10-CM

## 2023-06-02 DIAGNOSIS — E78.00 PURE HYPERCHOLESTEROLEMIA: ICD-10-CM

## 2023-06-02 DIAGNOSIS — Z78.0 ENCOUNTER FOR OSTEOPOROSIS SCREENING IN ASYMPTOMATIC POSTMENOPAUSAL PATIENT: ICD-10-CM

## 2023-06-02 DIAGNOSIS — J45.20 MILD INTERMITTENT ASTHMA WITHOUT COMPLICATION: ICD-10-CM

## 2023-06-02 DIAGNOSIS — J30.9 ALLERGIC RHINITIS, UNSPECIFIED SEASONALITY, UNSPECIFIED TRIGGER: ICD-10-CM

## 2023-06-02 DIAGNOSIS — E66.01 OBESITY, CLASS III, BMI 40-49.9 (MORBID OBESITY) (HCC): ICD-10-CM

## 2023-06-02 DIAGNOSIS — R35.0 URINARY FREQUENCY: ICD-10-CM

## 2023-06-02 LAB
BILIRUBIN, URINE, POC: NEGATIVE
BLOOD URINE, POC: ABNORMAL
GLUCOSE URINE, POC: NEGATIVE
GLUCOSE, POC: 109 MG/DL
HBA1C MFR BLD: 6.5 %
KETONES, URINE, POC: NEGATIVE
LEUKOCYTE ESTERASE, URINE, POC: ABNORMAL
NITRITE, URINE, POC: POSITIVE
PH, URINE, POC: 5.5 (ref 4.6–8)
PROTEIN,URINE, POC: NEGATIVE
SPECIFIC GRAVITY, URINE, POC: 1.02 (ref 1–1.03)
URINALYSIS CLARITY, POC: ABNORMAL
URINALYSIS COLOR, POC: ABNORMAL
UROBILINOGEN, POC: ABNORMAL

## 2023-06-02 RX ORDER — SENNOSIDES 8.6 MG
650 CAPSULE ORAL
COMMUNITY
Start: 2021-12-15

## 2023-06-02 RX ORDER — NITROFURANTOIN 25; 75 MG/1; MG/1
100 CAPSULE ORAL 2 TIMES DAILY
Qty: 14 CAPSULE | Refills: 0 | Status: SHIPPED | OUTPATIENT
Start: 2023-06-02 | End: 2023-06-12

## 2023-06-02 RX ORDER — MULTIVITAMIN
1 TABLET ORAL DAILY
COMMUNITY

## 2023-06-02 RX ORDER — BUDESONIDE AND FORMOTEROL FUMARATE DIHYDRATE 80; 4.5 UG/1; UG/1
2 AEROSOL RESPIRATORY (INHALATION) DAILY PRN
COMMUNITY
Start: 2021-01-15

## 2023-06-02 SDOH — ECONOMIC STABILITY: FOOD INSECURITY: WITHIN THE PAST 12 MONTHS, THE FOOD YOU BOUGHT JUST DIDN'T LAST AND YOU DIDN'T HAVE MONEY TO GET MORE.: NEVER TRUE

## 2023-06-02 SDOH — ECONOMIC STABILITY: HOUSING INSECURITY: IN THE LAST 12 MONTHS, HOW MANY PLACES HAVE YOU LIVED?: 1

## 2023-06-02 SDOH — ECONOMIC STABILITY: HOUSING INSECURITY
IN THE LAST 12 MONTHS, WAS THERE A TIME WHEN YOU DID NOT HAVE A STEADY PLACE TO SLEEP OR SLEPT IN A SHELTER (INCLUDING NOW)?: NO

## 2023-06-02 SDOH — HEALTH STABILITY: PHYSICAL HEALTH: ON AVERAGE, HOW MANY DAYS PER WEEK DO YOU ENGAGE IN MODERATE TO STRENUOUS EXERCISE (LIKE A BRISK WALK)?: 5 DAYS

## 2023-06-02 SDOH — ECONOMIC STABILITY: FOOD INSECURITY: WITHIN THE PAST 12 MONTHS, YOU WORRIED THAT YOUR FOOD WOULD RUN OUT BEFORE YOU GOT MONEY TO BUY MORE.: NEVER TRUE

## 2023-06-02 SDOH — ECONOMIC STABILITY: INCOME INSECURITY: IN THE LAST 12 MONTHS, WAS THERE A TIME WHEN YOU WERE NOT ABLE TO PAY THE MORTGAGE OR RENT ON TIME?: NO

## 2023-06-02 SDOH — ECONOMIC STABILITY: INCOME INSECURITY: HOW HARD IS IT FOR YOU TO PAY FOR THE VERY BASICS LIKE FOOD, HOUSING, MEDICAL CARE, AND HEATING?: HARD

## 2023-06-02 SDOH — HEALTH STABILITY: PHYSICAL HEALTH: ON AVERAGE, HOW MANY MINUTES DO YOU ENGAGE IN EXERCISE AT THIS LEVEL?: 10 MIN

## 2023-06-02 ASSESSMENT — PATIENT HEALTH QUESTIONNAIRE - PHQ9
SUM OF ALL RESPONSES TO PHQ9 QUESTIONS 1 & 2: 0
SUM OF ALL RESPONSES TO PHQ QUESTIONS 1-9: 0
3. TROUBLE FALLING OR STAYING ASLEEP: 0
5. POOR APPETITE OR OVEREATING: 0
SUM OF ALL RESPONSES TO PHQ QUESTIONS 1-9: 0
2. FEELING DOWN, DEPRESSED OR HOPELESS: 0
1. LITTLE INTEREST OR PLEASURE IN DOING THINGS: 0
8. MOVING OR SPEAKING SO SLOWLY THAT OTHER PEOPLE COULD HAVE NOTICED. OR THE OPPOSITE, BEING SO FIGETY OR RESTLESS THAT YOU HAVE BEEN MOVING AROUND A LOT MORE THAN USUAL: 0
9. THOUGHTS THAT YOU WOULD BE BETTER OFF DEAD, OR OF HURTING YOURSELF: 0
7. TROUBLE CONCENTRATING ON THINGS, SUCH AS READING THE NEWSPAPER OR WATCHING TELEVISION: 0
10. IF YOU CHECKED OFF ANY PROBLEMS, HOW DIFFICULT HAVE THESE PROBLEMS MADE IT FOR YOU TO DO YOUR WORK, TAKE CARE OF THINGS AT HOME, OR GET ALONG WITH OTHER PEOPLE: 0
SUM OF ALL RESPONSES TO PHQ QUESTIONS 1-9: 0
6. FEELING BAD ABOUT YOURSELF - OR THAT YOU ARE A FAILURE OR HAVE LET YOURSELF OR YOUR FAMILY DOWN: 0
SUM OF ALL RESPONSES TO PHQ QUESTIONS 1-9: 0
4. FEELING TIRED OR HAVING LITTLE ENERGY: 0

## 2023-06-02 ASSESSMENT — ANXIETY QUESTIONNAIRES
7. FEELING AFRAID AS IF SOMETHING AWFUL MIGHT HAPPEN: 0
6. BECOMING EASILY ANNOYED OR IRRITABLE: 0
IF YOU CHECKED OFF ANY PROBLEMS ON THIS QUESTIONNAIRE, HOW DIFFICULT HAVE THESE PROBLEMS MADE IT FOR YOU TO DO YOUR WORK, TAKE CARE OF THINGS AT HOME, OR GET ALONG WITH OTHER PEOPLE: NOT DIFFICULT AT ALL
5. BEING SO RESTLESS THAT IT IS HARD TO SIT STILL: 0
3. WORRYING TOO MUCH ABOUT DIFFERENT THINGS: 0
1. FEELING NERVOUS, ANXIOUS, OR ON EDGE: 0
2. NOT BEING ABLE TO STOP OR CONTROL WORRYING: 0
GAD7 TOTAL SCORE: 0
4. TROUBLE RELAXING: 0

## 2023-06-02 ASSESSMENT — ENCOUNTER SYMPTOMS
ALLERGIC/IMMUNOLOGIC NEGATIVE: 1
GASTROINTESTINAL NEGATIVE: 1
BACK PAIN: 1
RESPIRATORY NEGATIVE: 1
EYES NEGATIVE: 1

## 2023-06-02 ASSESSMENT — LIFESTYLE VARIABLES
HOW OFTEN DO YOU HAVE A DRINK CONTAINING ALCOHOL: NEVER
HOW MANY STANDARD DRINKS CONTAINING ALCOHOL DO YOU HAVE ON A TYPICAL DAY: PATIENT DOES NOT DRINK

## 2023-06-02 NOTE — PROGRESS NOTES
1. \"Have you been to the ER, urgent care clinic since your last visit? Hospitalized since your last visit? \" No    2. \"Have you seen or consulted any other health care providers outside of the [address] [place] [address] since your last visit? \" No     3. For patients aged 39-70: Has the patient had a colonoscopy / FIT/ Cologuard? NA - based on age      If the patient is female:    4. For patients aged 41-77: Has the patient had a mammogram within the past 2 years? NA - based on age or sex      11. For patients aged 21-65: Has the patient had a pap smear?  NA - based on age or sex    Chief Complaint   Patient presents with    Follow-up Chronic Condition     BP (!) 158/71 (Site: Left Upper Arm, Position: Sitting, Cuff Size: Large Adult)   Pulse 71   Temp 98.5 °F (36.9 °C) (Oral)   Resp 18   Ht 5' 1\" (1.549 m)   Wt 240 lb 9.6 oz (109.1 kg)   SpO2 97%   BMI 45.46 kg/m²
and she is already having Tylenol arthritis to take 1 pill twice a day. Type 2 diabetes mellitus hemoglobin A1c improved to 6.5% from 6.7% in March her random blood sugar is 109 in the office. Continue Jardiance 10 mg/day office I will not increase the dose. Ischemic heart disease under care of Dr. Minda Johnson continue current medications. Gout no recent flareup continue allopurinol and diet low in purine    Hypercholesteremia continue pravastatin 40 mg at bedtime. Allergies taking loratadine and fluticasone nasal spray. Morbid obesity try to cut back sugar and starch and fried food in the diet and sodium in the diet and portion control and restricted calorie in the diet    Bone density ordered. Labs ordered. In-house lab results explained. Refills given. She has no depression. Return in about 3 months (around 9/2/2023) for diabetes, hypertension, cholesterol follow up. SUBJECTIVE/OBJECTIVE:  TOMASA Hagan with a extreme pleasant personality came for regular follow-up. At her age she is independent for ADL/IADL except driving. She is having urinary frequency and suprapubic pressure for last 3 days and some back pain. Urine shows some changes of cystitis with urine dip positive for leukocytes and nitrite positive and some blood. She does not see any blood in urine. No fever or chills. She also has DJD. She also has IHD under care of cardiologist and she takes medicines with compliance today her random blood sugar 109 with hemoglobin A1c improved to 6.5% and she has no GI upset after being off from metformin and taking low-dose of Jardiance. She wants to do her bone density. No depression. No worsening or new symptoms of shortness of breath. Her blood pressure is controlled after resting. And it is normal in both upper arms that I checked. Allergies   Allergen Reactions    Clindamycin Diarrhea and Seizure     Reaction Type:  Allergy     Current Outpatient Medications

## 2023-06-05 ENCOUNTER — HOSPITAL ENCOUNTER (EMERGENCY)
Facility: HOSPITAL | Age: 83
Discharge: HOME | End: 2023-06-05
Attending: EMERGENCY MEDICINE
Payer: MEDICARE

## 2023-06-05 VITALS
RESPIRATION RATE: 18 BRPM | OXYGEN SATURATION: 97 % | SYSTOLIC BLOOD PRESSURE: 130 MMHG | TEMPERATURE: 98.2 F | DIASTOLIC BLOOD PRESSURE: 71 MMHG | HEART RATE: 63 BPM

## 2023-06-05 DIAGNOSIS — M54.42 ACUTE LEFT-SIDED LOW BACK PAIN WITH LEFT-SIDED SCIATICA: Primary | ICD-10-CM

## 2023-06-05 LAB
APPEARANCE UR: CLEAR
BACTERIA URNS QL MICRO: ABNORMAL /HPF
BILIRUB UR QL: NEGATIVE
COLOR UR: YELLOW
EPITH CASTS URNS QL MICRO: ABNORMAL /LPF
GLUCOSE UR STRIP.AUTO-MCNC: >1000 MG/DL
HGB UR QL STRIP: ABNORMAL
KETONES UR QL STRIP.AUTO: NEGATIVE MG/DL
LEUKOCYTE ESTERASE UR QL STRIP.AUTO: ABNORMAL
NITRITE UR QL STRIP.AUTO: NEGATIVE
PH UR STRIP: 5 (ref 5–8)
PROT UR STRIP-MCNC: ABNORMAL MG/DL
RBC #/AREA URNS HPF: ABNORMAL /HPF (ref 0–5)
SP GR UR REFRACTOMETRY: 1.01 (ref 1–1.03)
URINE CULTURE IF INDICATED: ABNORMAL
UROBILINOGEN UR QL STRIP.AUTO: 0.1 EU/DL (ref 0.2–1)
WBC URNS QL MICRO: ABNORMAL /HPF (ref 0–4)
YEAST URNS QL MICRO: PRESENT

## 2023-06-05 PROCEDURE — 81001 URINALYSIS AUTO W/SCOPE: CPT

## 2023-06-05 PROCEDURE — 6360000002 HC RX W HCPCS: Performed by: EMERGENCY MEDICINE

## 2023-06-05 PROCEDURE — 96372 THER/PROPH/DIAG INJ SC/IM: CPT

## 2023-06-05 PROCEDURE — 99284 EMERGENCY DEPT VISIT MOD MDM: CPT

## 2023-06-05 PROCEDURE — 6370000000 HC RX 637 (ALT 250 FOR IP): Performed by: EMERGENCY MEDICINE

## 2023-06-05 RX ORDER — NAPROXEN 500 MG/1
500 TABLET ORAL 2 TIMES DAILY WITH MEALS
Qty: 20 TABLET | Refills: 0 | Status: SHIPPED | OUTPATIENT
Start: 2023-06-05

## 2023-06-05 RX ORDER — KETOROLAC TROMETHAMINE 30 MG/ML
30 INJECTION, SOLUTION INTRAMUSCULAR; INTRAVENOUS
Status: COMPLETED | OUTPATIENT
Start: 2023-06-05 | End: 2023-06-05

## 2023-06-05 RX ORDER — METHOCARBAMOL 500 MG/1
500 TABLET, FILM COATED ORAL 4 TIMES DAILY
Qty: 40 TABLET | Refills: 0 | Status: SHIPPED | OUTPATIENT
Start: 2023-06-05 | End: 2023-06-15

## 2023-06-05 RX ORDER — METHOCARBAMOL 500 MG/1
750 TABLET, FILM COATED ORAL
Status: COMPLETED | OUTPATIENT
Start: 2023-06-05 | End: 2023-06-05

## 2023-06-05 RX ORDER — LIDOCAINE 4 G/G
1 PATCH TOPICAL
Status: DISCONTINUED | OUTPATIENT
Start: 2023-06-05 | End: 2023-06-05 | Stop reason: HOSPADM

## 2023-06-05 RX ADMIN — METHOCARBAMOL 750 MG: 500 TABLET ORAL at 08:42

## 2023-06-05 RX ADMIN — KETOROLAC TROMETHAMINE 30 MG: 30 INJECTION, SOLUTION INTRAMUSCULAR at 08:46

## 2023-06-05 ASSESSMENT — PAIN DESCRIPTION - ORIENTATION
ORIENTATION: LEFT
ORIENTATION: LEFT

## 2023-06-05 ASSESSMENT — PAIN DESCRIPTION - LOCATION
LOCATION: ABDOMEN;FLANK;BACK
LOCATION: BACK

## 2023-06-05 ASSESSMENT — PAIN DESCRIPTION - DESCRIPTORS
DESCRIPTORS: ACHING
DESCRIPTORS: ACHING;BURNING

## 2023-06-05 ASSESSMENT — PAIN SCALES - GENERAL
PAINLEVEL_OUTOF10: 5
PAINLEVEL_OUTOF10: 7

## 2023-06-05 ASSESSMENT — PAIN - FUNCTIONAL ASSESSMENT: PAIN_FUNCTIONAL_ASSESSMENT: 0-10

## 2023-06-05 NOTE — ED PROVIDER NOTES
nitrofurantoin (macrocrystal-monohydrate) 100 MG capsule  Commonly known as: MACROBID  Take 1 capsule by mouth 2 times daily for 10 days     nitroGLYCERIN 0.3 MG SL tablet  Commonly known as: NITROSTAT     omeprazole 40 MG delayed release capsule  Commonly known as: PRILOSEC     potassium chloride 10 MEQ extended release tablet  Commonly known as: KLOR-CON M     pravastatin 40 MG tablet  Commonly known as: PRAVACHOL     Psyllium 95 % Powd               Where to Get Your Medications        These medications were sent to [facility] [address], [address] [facility] [phone]  [address] [facility] , [address] [address]      Phone: [phone]   methocarbamol 500 MG tablet  naproxen 500 MG tablet           DISCONTINUED MEDICATIONS:  Current Discharge Medication List          I am the Primary Clinician of Record: Leana Mcnulty MD (electronically signed)    (Please note that parts of this dictation were completed with voice recognition software. Quite often unanticipated grammatical, syntax, homophones, and other interpretive errors are inadvertently transcribed by the computer software. Please disregards these errors.  Please excuse any errors that have escaped final proofreading.)     Leana Mcnulty MD  06/05/23 2017

## 2023-06-05 NOTE — ED TRIAGE NOTES
Pt states she went to PCP last Friday for lower back pain, was treated for a UTI.  No relief with tylenol at home, still having pain

## 2023-06-14 RX ORDER — EMPAGLIFLOZIN 10 MG/1
TABLET, FILM COATED ORAL
Qty: 90 TABLET | Refills: 1 | Status: SHIPPED | OUTPATIENT
Start: 2023-06-14

## 2023-06-21 RX ORDER — PRAVASTATIN SODIUM 40 MG
TABLET ORAL
Qty: 90 TABLET | Refills: 2 | Status: SHIPPED | OUTPATIENT
Start: 2023-06-21

## 2023-07-28 ENCOUNTER — HOSPITAL ENCOUNTER (EMERGENCY)
Facility: HOSPITAL | Age: 83
Discharge: HOME | End: 2023-07-28
Attending: EMERGENCY MEDICINE
Payer: MEDICARE

## 2023-07-28 VITALS
TEMPERATURE: 98.3 F | RESPIRATION RATE: 16 BRPM | HEART RATE: 61 BPM | SYSTOLIC BLOOD PRESSURE: 127 MMHG | OXYGEN SATURATION: 97 % | WEIGHT: 242 LBS | HEIGHT: 62 IN | DIASTOLIC BLOOD PRESSURE: 63 MMHG | BODY MASS INDEX: 44.53 KG/M2

## 2023-07-28 DIAGNOSIS — S86.911A STRAIN OF RIGHT KNEE, INITIAL ENCOUNTER: Primary | ICD-10-CM

## 2023-07-28 PROCEDURE — 6370000000 HC RX 637 (ALT 250 FOR IP): Performed by: EMERGENCY MEDICINE

## 2023-07-28 PROCEDURE — 99283 EMERGENCY DEPT VISIT LOW MDM: CPT

## 2023-07-28 RX ORDER — NAPROXEN 500 MG/1
500 TABLET ORAL 2 TIMES DAILY
Qty: 60 TABLET | Refills: 0 | Status: SHIPPED | OUTPATIENT
Start: 2023-07-28

## 2023-07-28 RX ORDER — TRAMADOL HYDROCHLORIDE 50 MG/1
50 TABLET ORAL
Status: COMPLETED | OUTPATIENT
Start: 2023-07-28 | End: 2023-07-28

## 2023-07-28 RX ADMIN — TRAMADOL HYDROCHLORIDE 50 MG: 50 TABLET ORAL at 08:55

## 2023-07-28 ASSESSMENT — PAIN DESCRIPTION - LOCATION: LOCATION: KNEE

## 2023-07-28 ASSESSMENT — PAIN DESCRIPTION - ORIENTATION: ORIENTATION: RIGHT

## 2023-07-28 ASSESSMENT — PAIN - FUNCTIONAL ASSESSMENT: PAIN_FUNCTIONAL_ASSESSMENT: 0-10

## 2023-07-28 NOTE — ED TRIAGE NOTES
Pt reports R knee giving away this morning when getting into the car. Reports this is the second time this happened in the last week. Unable to bear weight now.

## 2023-07-28 NOTE — ED PROVIDER NOTES
is in agreement. PLAN:  1. Medication List        START taking these medications      naproxen 500 MG tablet  Commonly known as: Naprosyn  Take 1 tablet by mouth 2 times daily            ASK your doctor about these medications      acetaminophen 650 MG extended release tablet  Commonly known as: TYLENOL     albuterol sulfate  (90 Base) MCG/ACT inhaler  Commonly known as: PROVENTIL;VENTOLIN;PROAIR     allopurinol 100 MG tablet  Commonly known as: ZYLOPRIM     aspirin 81 MG chewable tablet     budesonide-formoterol 80-4.5 MCG/ACT Aero  Commonly known as: SYMBICORT     carvedilol 12.5 MG tablet  Commonly known as: COREG     Daily Vites Tabs     diclofenac sodium 1 % Gel  Commonly known as: VOLTAREN  Apply 2 g topically 4 times daily as needed for Pain Use as needed omn back,painful joints     fluticasone 50 MCG/ACT nasal spray  Commonly known as: FLONASE     isosorbide mononitrate 30 MG extended release tablet  Commonly known as: IMDUR     Jardiance 10 MG tablet  Generic drug: empagliflozin  TAKE 1 TABLET BY MOUTH ONCE DAILY BEFORE BREAKFAST     loratadine 10 MG tablet  Commonly known as: CLARITIN     losartan-hydroCHLOROthiazide 100-25 MG per tablet  Commonly known as: HYZAAR     nitroGLYCERIN 0.3 MG SL tablet  Commonly known as: NITROSTAT     omeprazole 40 MG delayed release capsule  Commonly known as: PRILOSEC     potassium chloride 10 MEQ extended release tablet  Commonly known as: KLOR-CON M     pravastatin 40 MG tablet  Commonly known as: PRAVACHOL  Take 1 tablet by mouth nightly     Psyllium 95 % Powd               Where to Get Your Medications        These medications were sent to [facility] [address] [facility] [facility] [address] [facility], [address] [place] [address] [phone]  [address] [facility], [address]      Phone: [phone]   naproxen 500 MG tablet       2. @[facility]@  3. Drink plenty of fluids  4.  Return to ED if worse especially if any shortness of breath, chest pain or

## 2023-08-02 RX ORDER — ALLOPURINOL 100 MG/1
TABLET ORAL
Qty: 180 TABLET | Refills: 2 | Status: SHIPPED | OUTPATIENT
Start: 2023-08-02

## 2023-09-04 PROBLEM — R35.0 URINARY FREQUENCY: Status: RESOLVED | Noted: 2023-06-02 | Resolved: 2023-09-04

## 2023-09-04 PROBLEM — M54.50 ACUTE BILATERAL LOW BACK PAIN WITHOUT SCIATICA: Status: RESOLVED | Noted: 2023-06-02 | Resolved: 2023-09-04

## 2023-09-04 PROBLEM — R06.83 SNORING: Status: RESOLVED | Noted: 2023-03-03 | Resolved: 2023-09-04

## 2023-09-04 PROBLEM — R14.0 BLOATING: Status: RESOLVED | Noted: 2023-03-03 | Resolved: 2023-09-04

## 2023-09-05 ENCOUNTER — OFFICE VISIT (OUTPATIENT)
Facility: CLINIC | Age: 83
End: 2023-09-05
Payer: MEDICARE

## 2023-09-05 VITALS
OXYGEN SATURATION: 99 % | WEIGHT: 233.8 LBS | BODY MASS INDEX: 43.02 KG/M2 | SYSTOLIC BLOOD PRESSURE: 128 MMHG | HEIGHT: 62 IN | TEMPERATURE: 98.1 F | HEART RATE: 72 BPM | DIASTOLIC BLOOD PRESSURE: 72 MMHG | RESPIRATION RATE: 16 BRPM

## 2023-09-05 DIAGNOSIS — M47.26 OSTEOARTHRITIS OF SPINE WITH RADICULOPATHY, LUMBAR REGION: ICD-10-CM

## 2023-09-05 DIAGNOSIS — N18.31 STAGE 3A CHRONIC KIDNEY DISEASE (HCC): ICD-10-CM

## 2023-09-05 DIAGNOSIS — I10 ESSENTIAL HYPERTENSION: Primary | ICD-10-CM

## 2023-09-05 DIAGNOSIS — K59.00 CONSTIPATION, UNSPECIFIED CONSTIPATION TYPE: ICD-10-CM

## 2023-09-05 DIAGNOSIS — I87.2 VENOUS INSUFFICIENCY OF BOTH LOWER EXTREMITIES: ICD-10-CM

## 2023-09-05 DIAGNOSIS — M1A.9XX0 CHRONIC GOUT WITHOUT TOPHUS, UNSPECIFIED CAUSE, UNSPECIFIED SITE: ICD-10-CM

## 2023-09-05 DIAGNOSIS — E11.9 CONTROLLED TYPE 2 DIABETES MELLITUS WITHOUT COMPLICATION, WITHOUT LONG-TERM CURRENT USE OF INSULIN (HCC): ICD-10-CM

## 2023-09-05 DIAGNOSIS — Z12.31 ENCOUNTER FOR SCREENING MAMMOGRAM FOR MALIGNANT NEOPLASM OF BREAST: ICD-10-CM

## 2023-09-05 DIAGNOSIS — E78.00 PURE HYPERCHOLESTEROLEMIA: ICD-10-CM

## 2023-09-05 DIAGNOSIS — K21.9 GASTROESOPHAGEAL REFLUX DISEASE WITHOUT ESOPHAGITIS: ICD-10-CM

## 2023-09-05 DIAGNOSIS — Z86.2 HISTORY OF SARCOIDOSIS: ICD-10-CM

## 2023-09-05 DIAGNOSIS — M54.16 LUMBAR RADICULOPATHY: ICD-10-CM

## 2023-09-05 DIAGNOSIS — J45.20 MILD INTERMITTENT ASTHMA WITHOUT COMPLICATION: ICD-10-CM

## 2023-09-05 DIAGNOSIS — J30.9 ALLERGIC RHINITIS, UNSPECIFIED SEASONALITY, UNSPECIFIED TRIGGER: ICD-10-CM

## 2023-09-05 PROBLEM — N18.30 CHRONIC RENAL DISEASE, STAGE III (HCC): Status: ACTIVE | Noted: 2023-09-05

## 2023-09-05 LAB
GLUCOSE, POC: 116 MG/DL
HBA1C MFR BLD: 7.1 %

## 2023-09-05 PROCEDURE — 3074F SYST BP LT 130 MM HG: CPT | Performed by: INTERNAL MEDICINE

## 2023-09-05 PROCEDURE — G8427 DOCREV CUR MEDS BY ELIG CLIN: HCPCS | Performed by: INTERNAL MEDICINE

## 2023-09-05 PROCEDURE — 1123F ACP DISCUSS/DSCN MKR DOCD: CPT | Performed by: INTERNAL MEDICINE

## 2023-09-05 PROCEDURE — 3078F DIAST BP <80 MM HG: CPT | Performed by: INTERNAL MEDICINE

## 2023-09-05 PROCEDURE — G8417 CALC BMI ABV UP PARAM F/U: HCPCS | Performed by: INTERNAL MEDICINE

## 2023-09-05 PROCEDURE — 1090F PRES/ABSN URINE INCON ASSESS: CPT | Performed by: INTERNAL MEDICINE

## 2023-09-05 PROCEDURE — 83036 HEMOGLOBIN GLYCOSYLATED A1C: CPT | Performed by: INTERNAL MEDICINE

## 2023-09-05 PROCEDURE — 99214 OFFICE O/P EST MOD 30 MIN: CPT | Performed by: INTERNAL MEDICINE

## 2023-09-05 PROCEDURE — G8400 PT W/DXA NO RESULTS DOC: HCPCS | Performed by: INTERNAL MEDICINE

## 2023-09-05 PROCEDURE — 1036F TOBACCO NON-USER: CPT | Performed by: INTERNAL MEDICINE

## 2023-09-05 PROCEDURE — 82962 GLUCOSE BLOOD TEST: CPT | Performed by: INTERNAL MEDICINE

## 2023-09-05 RX ORDER — POLYETHYLENE GLYCOL 3350 17 G/17G
17 POWDER, FOR SOLUTION ORAL DAILY
Qty: 1530 G | Refills: 1 | Status: SHIPPED | OUTPATIENT
Start: 2023-09-05

## 2023-09-05 RX ORDER — DICLOFENAC SODIUM 75 MG/1
75 TABLET, DELAYED RELEASE ORAL 2 TIMES DAILY
COMMUNITY
Start: 2023-08-28 | End: 2023-09-05

## 2023-09-05 ASSESSMENT — ENCOUNTER SYMPTOMS
ALLERGIC/IMMUNOLOGIC NEGATIVE: 1
GASTROINTESTINAL NEGATIVE: 1
EYES NEGATIVE: 1
RESPIRATORY NEGATIVE: 1

## 2023-09-05 NOTE — PROGRESS NOTES
Donal Adrian (: 1940) is a 80 y.o. female, Established patient patient, here for evaluation of the following chief complaint(s):  Follow-up Chronic Condition (diabetes, hypertension, cholesterol )         ASSESSMENT/PLAN:  Below is the assessment and plan developed based on review of pertinent history, physical exam, labs, studies, and medications. 1. Essential hypertension  2. Controlled type 2 diabetes mellitus without complication, without long-term current use of insulin ([facility])  -     AMB POC GLUCOSE BLOOD, BY GLUCOSE MONITORING DEVICE  -     AMB POC HEMOGLOBIN A1C  3. Stage 3a chronic kidney disease ([address])  -     Basic Metabolic Panel; Future  -     CBC with Auto Differential; Future  4. Pure hypercholesterolemia  5. Chronic gout without tophus, unspecified cause, unspecified site  6. Osteoarthritis of spine with radiculopathy, lumbar region  7. Gastroesophageal reflux disease without esophagitis  8. Constipation, unspecified constipation type  9. Mild intermittent asthma without complication  10. Allergic rhinitis, unspecified seasonality, unspecified trigger  11. History of sarcoidosis  12. Lumbar radiculopathy  13. Venous insufficiency of both lower extremities  14. Encounter for screening mammogram for malignant neoplasm of breast  -     [facility] DIGITAL SCREEN BILATERAL; Future      Hypertension, controlled, continue all current medications and she follows Dr. Karen Salguero once a year. Continue current dose of losartan/HCTZ 100/25 mg once a day. Diet low in sodium. Continue carvedilol 12.5 mg twice a day. Type 2 diabetes mellitus controlled. Non-insulin-dependent. Today hemoglobin A1c 7.1%. Jardiance increased to 25 mg once a day before breakfast.  No history of UTI or yeast infection. She should cut back sugar starch ice cream and sugar-containing beverages. Recently she had a birthday and celebrated with ice cream and cake. Her random blood sugar is 116 in the office.   She had removal

## 2023-09-12 ENCOUNTER — TELEPHONE (OUTPATIENT)
Facility: CLINIC | Age: 83
End: 2023-09-12

## 2023-09-12 NOTE — TELEPHONE ENCOUNTER
Spoke with patient, requested most recent office note and labs be faxed to Dr. Alex Ramsey. Records have been faxed.

## 2023-10-11 RX ORDER — POTASSIUM CHLORIDE 750 MG/1
TABLET, EXTENDED RELEASE ORAL
Qty: 30 TABLET | Refills: 0 | Status: SHIPPED | OUTPATIENT
Start: 2023-10-11 | End: 2023-11-17

## 2023-11-17 RX ORDER — POTASSIUM CHLORIDE 750 MG/1
TABLET, EXTENDED RELEASE ORAL
Qty: 90 TABLET | Refills: 2 | Status: SHIPPED | OUTPATIENT
Start: 2023-11-17

## 2023-11-23 DIAGNOSIS — N18.31 STAGE 3A CHRONIC KIDNEY DISEASE (HCC): Primary | ICD-10-CM

## 2023-11-23 LAB
BASOPHILS # BLD AUTO: 0 X10E3/UL (ref 0–0.2)
BASOPHILS NFR BLD AUTO: 0 %
BUN SERPL-MCNC: 42 MG/DL (ref 8–27)
BUN/CREAT SERPL: 26 (ref 12–28)
CALCIUM SERPL-MCNC: 10 MG/DL (ref 8.7–10.3)
CHLORIDE SERPL-SCNC: 97 MMOL/L (ref 96–106)
CO2 SERPL-SCNC: 28 MMOL/L (ref 20–29)
CREAT SERPL-MCNC: 1.63 MG/DL (ref 0.57–1)
EGFRCR SERPLBLD CKD-EPI 2021: 31 ML/MIN/1.73
EOSINOPHIL # BLD AUTO: 0 X10E3/UL (ref 0–0.4)
EOSINOPHIL NFR BLD AUTO: 0 %
ERYTHROCYTE [DISTWIDTH] IN BLOOD BY AUTOMATED COUNT: 15.1 % (ref 11.7–15.4)
GLUCOSE SERPL-MCNC: 151 MG/DL (ref 70–99)
HCT VFR BLD AUTO: 45.8 % (ref 34–46.6)
HGB BLD-MCNC: 14.6 G/DL (ref 11.1–15.9)
IMM GRANULOCYTES # BLD AUTO: 0 X10E3/UL (ref 0–0.1)
IMM GRANULOCYTES NFR BLD AUTO: 0 %
LYMPHOCYTES # BLD AUTO: 1.5 X10E3/UL (ref 0.7–3.1)
LYMPHOCYTES NFR BLD AUTO: 18 %
MCH RBC QN AUTO: 24.6 PG (ref 26.6–33)
MCHC RBC AUTO-ENTMCNC: 31.9 G/DL (ref 31.5–35.7)
MCV RBC AUTO: 77 FL (ref 79–97)
MONOCYTES # BLD AUTO: 0.5 X10E3/UL (ref 0.1–0.9)
MONOCYTES NFR BLD AUTO: 6 %
NEUTROPHILS # BLD AUTO: 6.1 X10E3/UL (ref 1.4–7)
NEUTROPHILS NFR BLD AUTO: 76 %
PLATELET # BLD AUTO: 278 X10E3/UL (ref 150–450)
POTASSIUM SERPL-SCNC: 3.7 MMOL/L (ref 3.5–5.2)
RBC # BLD AUTO: 5.94 X10E6/UL (ref 3.77–5.28)
SODIUM SERPL-SCNC: 141 MMOL/L (ref 134–144)
WBC # BLD AUTO: 8.1 X10E3/UL (ref 3.4–10.8)

## 2023-12-05 RX ORDER — LOSARTAN POTASSIUM AND HYDROCHLOROTHIAZIDE 25; 100 MG/1; MG/1
1 TABLET ORAL EVERY MORNING
Qty: 90 TABLET | Refills: 1 | Status: SHIPPED | OUTPATIENT
Start: 2023-12-05

## 2023-12-12 ENCOUNTER — HOSPITAL ENCOUNTER (OUTPATIENT)
Facility: HOSPITAL | Age: 83
Discharge: HOME | End: 2023-12-15
Attending: INTERNAL MEDICINE
Payer: MEDICARE

## 2023-12-12 DIAGNOSIS — Z12.31 ENCOUNTER FOR SCREENING MAMMOGRAM FOR MALIGNANT NEOPLASM OF BREAST: ICD-10-CM

## 2023-12-12 PROCEDURE — 77063 BREAST TOMOSYNTHESIS BI: CPT

## 2023-12-19 NOTE — PROGRESS NOTES
Care Transitions Follow Up Call    Challenges to be reviewed by the provider   Additional needs identified to be addressed with provider: no           Method of communication with provider : none    Care Transition Nurse (CTN) contacted the patient by telephone to follow up after admission on 2022. Verified name and  with patient as identifiers. Addressed changes since last contact: none  Follow up appointment completed? yes. Was follow up appointment scheduled within 7 days of discharge? yes. Advance Care Planning:   Does patient have an Advance Directive:  currently not on file; patient declined education    CTN reviewed discharge instructions, medical action plan and red flags with patient and discussed any barriers to care and/or understanding of plan of care after discharge. Discussed appropriate site of care based on symptoms and resources available to patient including: PCP and Specialist. The patient agrees to contact the PCP office for questions related to their healthcare. Patients top risk factors for readmission: none noted   Interventions to address risk factors: Obtained and reviewed discharge summary and/or continuity of care documents    [facility] follow up appointment(s):   Future Appointments   Date Time Provider Wes Arguello   2022  9:00 AM Rhea Sinclair MD RSIP BS AMB   2022 11:20 AM SRM KELVIN 1 SRMRMAM SRM   2022  2:00 PM Royce Parker MD REP BS AMB     Non-[facility] follow up appointment(s): none    CTN provided contact information for future needs. Plan for follow-up call in 5-7 days based on severity of symptoms and risk factors. Plan for next call: follow up      Goals Addressed                 This Visit's Progress     Attends follow-up appointments as directed.    On track     2022  -Patient attended ENT follow up on   -Will attend PCP follow up on   -CTN to follow up in 1 week  SP  2022  -Will attend PCP follow up on   -CTN to follow The patient was seen by the midlevel/resident.  I have personally performed a substantive portion of the encounter.  I reviewed the EKG's (when done) and agree with the interpretation.  I have seen and examined the patient; agree with the workup, evaluation, MDM, management and diagnosis.  The care plan has been discussed with the midlevel/resident; I have reviewed the note and agree with the documented findings.     Patient presents complaining of foul odor from her vagina for 2 months and some abdominal discomfort in lower abdomen both sides.  She has an IUD that is been in place about 3 years.  She is concerned that it needs to be removed.  She be worked up in the ED with imaging and labs. Discussed with her GYN who will follow up with regard to IUD removal.      Diagnoses as of 12/19/23 0925   Pelvic inflammatory disease   UTI (urinary tract infection), bacterial     Kameron Barajas MD      up in 1 week  SP       Prevent complications post hospitalization. On track     7/18/2022  -Patient reports that she did not receive BP cuff in the mail yet. CTN discussed with patient BP parameters. Will notify CTN if BP cuff arrives before next scheduled call to discuss proper monitoring procedure  -CTN to follow up in 1 week  SP   7/11/2022  -Daughter purchased BP cuff online. Will arrive in 2 days. Daughter to monitor BP daily and record. Will report bp >180/100 or <80/40.   -CTN to follow up in 1 week  SP

## 2023-12-28 ENCOUNTER — APPOINTMENT (OUTPATIENT)
Facility: HOSPITAL | Age: 83
End: 2023-12-28
Payer: MEDICARE

## 2023-12-28 ENCOUNTER — HOSPITAL ENCOUNTER (EMERGENCY)
Facility: HOSPITAL | Age: 83
Discharge: HOME | End: 2023-12-28
Payer: MEDICARE

## 2023-12-28 VITALS
BODY MASS INDEX: 43.8 KG/M2 | OXYGEN SATURATION: 98 % | HEART RATE: 63 BPM | HEIGHT: 61 IN | RESPIRATION RATE: 14 BRPM | WEIGHT: 232 LBS | TEMPERATURE: 98.6 F | SYSTOLIC BLOOD PRESSURE: 162 MMHG | DIASTOLIC BLOOD PRESSURE: 79 MMHG

## 2023-12-28 DIAGNOSIS — M79.604 RIGHT LEG PAIN: Primary | ICD-10-CM

## 2023-12-28 DIAGNOSIS — M17.11 PRIMARY OSTEOARTHRITIS OF RIGHT KNEE: ICD-10-CM

## 2023-12-28 PROCEDURE — 73562 X-RAY EXAM OF KNEE 3: CPT

## 2023-12-28 PROCEDURE — 6370000000 HC RX 637 (ALT 250 FOR IP)

## 2023-12-28 PROCEDURE — 99283 EMERGENCY DEPT VISIT LOW MDM: CPT

## 2023-12-28 RX ORDER — LIDOCAINE 4 G/G
1 PATCH TOPICAL
Status: DISCONTINUED | OUTPATIENT
Start: 2023-12-28 | End: 2023-12-29 | Stop reason: HOSPADM

## 2023-12-28 RX ORDER — TRAMADOL HYDROCHLORIDE 50 MG/1
50 TABLET ORAL EVERY 6 HOURS PRN
Qty: 12 TABLET | Refills: 0 | Status: SHIPPED | OUTPATIENT
Start: 2023-12-28 | End: 2023-12-31

## 2023-12-28 RX ORDER — TRAMADOL HYDROCHLORIDE 50 MG/1
50 TABLET ORAL
Status: COMPLETED | OUTPATIENT
Start: 2023-12-28 | End: 2023-12-28

## 2023-12-28 RX ADMIN — TRAMADOL HYDROCHLORIDE 50 MG: 50 TABLET ORAL at 21:00

## 2023-12-29 ENCOUNTER — HOSPITAL ENCOUNTER (OUTPATIENT)
Facility: HOSPITAL | Age: 83
End: 2023-12-29
Payer: MEDICARE

## 2023-12-29 ENCOUNTER — TRANSCRIBE ORDERS (OUTPATIENT)
Facility: HOSPITAL | Age: 83
End: 2023-12-29

## 2023-12-29 DIAGNOSIS — R60.9 SWELLING: Primary | ICD-10-CM

## 2023-12-29 DIAGNOSIS — R60.9 SWELLING: ICD-10-CM

## 2023-12-29 DIAGNOSIS — M79.609 PAIN IN EXTREMITY, UNSPECIFIED EXTREMITY: ICD-10-CM

## 2023-12-29 PROCEDURE — 93971 EXTREMITY STUDY: CPT

## 2023-12-29 NOTE — ED PROVIDER NOTES
loratadine 10 MG tablet  Commonly known as: CLARITIN     losartan-hydroCHLOROthiazide 100-25 MG per tablet  Commonly known as: HYZAAR  TAKE 1 TABLET BY MOUTH IN THE MORNING     nitroGLYCERIN 0.3 MG SL tablet  Commonly known as: NITROSTAT     omeprazole 40 MG delayed release capsule  Commonly known as: PRILOSEC  TAKE 1 CAPSULE BY MOUTH ONCE DAILY. TAKE 30 MINUTES BEFORE EATING ONCE A DAY     polyethylene glycol 17 GM/SCOOP powder  Commonly known as: GLYCOLAX  Take 17 g by mouth daily     potassium chloride 10 MEQ extended release tablet  Commonly known as: KLOR-CON M  Take 1 tablet by mouth once daily     pravastatin 40 MG tablet  Commonly known as: PRAVACHOL  Take 1 tablet by mouth nightly     Psyllium 95 % Powd               Where to Get Your Medications        These medications were sent to [address] [place] [address] [phone]  [address] [place] [address], [address]      Phone: [phone]   traMADol 50 MG tablet           DISCONTINUED MEDICATIONS:  Discharge Medication List as of 12/28/2023 10:57 PM          I am the Primary Clinician of Record: Davis Balbuena PA-C (electronically signed)    (Please note that parts of this dictation were completed with voice recognition software. Quite often unanticipated grammatical, syntax, homophones, and other interpretive errors are inadvertently transcribed by the computer software. Please disregards these errors.  Please excuse any errors that have escaped final proofreading.)     Davis Balbuena PA-C  12/28/23 [id number]

## 2023-12-29 NOTE — ED TRIAGE NOTES
Patient c/o right side leg pain that is worse when she tries to walk or move on it. Denies injury. Hx arthritis.

## 2023-12-31 PROBLEM — M15.0 PRIMARY OSTEOARTHRITIS INVOLVING MULTIPLE JOINTS: Status: ACTIVE | Noted: 2021-01-06

## 2023-12-31 PROBLEM — M15.9 PRIMARY OSTEOARTHRITIS INVOLVING MULTIPLE JOINTS: Status: ACTIVE | Noted: 2021-01-06

## 2024-01-05 ENCOUNTER — OFFICE VISIT (OUTPATIENT)
Facility: CLINIC | Age: 84
End: 2024-01-05

## 2024-01-05 VITALS
OXYGEN SATURATION: 96 % | WEIGHT: 232 LBS | HEART RATE: 72 BPM | HEIGHT: 61 IN | SYSTOLIC BLOOD PRESSURE: 138 MMHG | BODY MASS INDEX: 43.8 KG/M2 | TEMPERATURE: 98.8 F | DIASTOLIC BLOOD PRESSURE: 80 MMHG

## 2024-01-05 DIAGNOSIS — M71.21 SYNOVIAL CYST OF RIGHT POPLITEAL SPACE: ICD-10-CM

## 2024-01-05 DIAGNOSIS — E66.01 OBESITY, CLASS III, BMI 40-49.9 (MORBID OBESITY) (HCC): ICD-10-CM

## 2024-01-05 DIAGNOSIS — K21.9 GASTROESOPHAGEAL REFLUX DISEASE WITHOUT ESOPHAGITIS: ICD-10-CM

## 2024-01-05 DIAGNOSIS — E78.00 PURE HYPERCHOLESTEROLEMIA: ICD-10-CM

## 2024-01-05 DIAGNOSIS — M54.16 LUMBAR RADICULOPATHY: ICD-10-CM

## 2024-01-05 DIAGNOSIS — N18.31 STAGE 3A CHRONIC KIDNEY DISEASE (HCC): ICD-10-CM

## 2024-01-05 DIAGNOSIS — I87.2 VENOUS INSUFFICIENCY OF BOTH LOWER EXTREMITIES: ICD-10-CM

## 2024-01-05 DIAGNOSIS — J45.20 MILD INTERMITTENT ASTHMA WITHOUT COMPLICATION: ICD-10-CM

## 2024-01-05 DIAGNOSIS — I10 ESSENTIAL HYPERTENSION: ICD-10-CM

## 2024-01-05 DIAGNOSIS — E11.9 CONTROLLED TYPE 2 DIABETES MELLITUS WITHOUT COMPLICATION, WITHOUT LONG-TERM CURRENT USE OF INSULIN (HCC): ICD-10-CM

## 2024-01-05 DIAGNOSIS — M1A.9XX0 CHRONIC GOUT WITHOUT TOPHUS, UNSPECIFIED CAUSE, UNSPECIFIED SITE: ICD-10-CM

## 2024-01-05 DIAGNOSIS — I25.9 ISCHEMIC HEART DISEASE: ICD-10-CM

## 2024-01-05 DIAGNOSIS — M15.9 PRIMARY OSTEOARTHRITIS INVOLVING MULTIPLE JOINTS: ICD-10-CM

## 2024-01-05 LAB
GLUCOSE, POC: 119 MG/DL
HBA1C MFR BLD: 6.4 %

## 2024-01-05 RX ORDER — TRAMADOL HYDROCHLORIDE 50 MG/1
50 TABLET ORAL EVERY 6 HOURS PRN
COMMUNITY

## 2024-01-05 RX ORDER — ALBUTEROL SULFATE 90 UG/1
2 AEROSOL, METERED RESPIRATORY (INHALATION) EVERY 6 HOURS PRN
Qty: 18 G | Refills: 5 | Status: SHIPPED | OUTPATIENT
Start: 2024-01-05

## 2024-01-05 RX ORDER — FLUTICASONE PROPIONATE 50 MCG
2 SPRAY, SUSPENSION (ML) NASAL DAILY
Qty: 16 G | Refills: 5 | Status: SHIPPED | OUTPATIENT
Start: 2024-01-05

## 2024-01-05 ASSESSMENT — PATIENT HEALTH QUESTIONNAIRE - PHQ9
SUM OF ALL RESPONSES TO PHQ QUESTIONS 1-9: 0
SUM OF ALL RESPONSES TO PHQ QUESTIONS 1-9: 0
1. LITTLE INTEREST OR PLEASURE IN DOING THINGS: 0
2. FEELING DOWN, DEPRESSED OR HOPELESS: 0
SUM OF ALL RESPONSES TO PHQ QUESTIONS 1-9: 0
SUM OF ALL RESPONSES TO PHQ9 QUESTIONS 1 & 2: 0
SUM OF ALL RESPONSES TO PHQ QUESTIONS 1-9: 0

## 2024-01-05 ASSESSMENT — ENCOUNTER SYMPTOMS
ALLERGIC/IMMUNOLOGIC NEGATIVE: 1
RESPIRATORY NEGATIVE: 1
EYES NEGATIVE: 1

## 2024-01-05 NOTE — PROGRESS NOTES
Anesthesia Start and Stop Event Times     Date Time Event    4/7/2023 1310 Ready for Procedure     1340 Anesthesia Start     1637 Anesthesia Stop        Responsible Staff  04/07/23    Name Role Begin End    Miki Carson M.D. Anesth 1340 1637        Overtime Reason:  no overtime (within assigned shift)    Comments:                                                      
Ang.  Chief Complaint   Patient presents with    Hypertension    Diabetes    Follow-up Chronic Condition    Follow-up       .1. Have you been to the ER, urgent care clinic since your last visit?  Hospitalized since your last visit? Yes, [facility] 12/28/23 for leg pain and swelling.    2. Have you seen or consulted any other health care providers outside of the [facility] since your last visit?  Include any pap smears or colon screening. No    .BP (!) 138/94 (Site: Left Upper Arm, Position: Sitting, Cuff Size: Large Adult)   Pulse 68   Temp 98.8 °F (37.1 °C) (Oral)   Ht 1.549 m (5' 1\")   Wt 105.2 kg (232 lb)   SpO2 96%   BMI 43.84 kg/m²             
Hyperlipidemia     Hypertension     Knee pain     Left tibialis posterior tendinitis     Lumbar radiculopathy     Morbid obesity (HCC)     Osteoarthritis     Sarcoidosis     Shortness of breath     Spondylosis      Past Surgical History:   Procedure Laterality Date    BREAST REDUCTION SURGERY      30 yrs ago    CHOLECYSTECTOMY      CYST REMOVAL      GI  2012    hx of bowel resection from diverticulitits    HYSTERECTOMY (CERVIX STATUS UNKNOWN)      IR CHOLECYSTOSTOMY PERCUTANEOUS COMPLETE       Family History   Family history unknown: Yes     Social History     Tobacco Use   Smoking Status Never   Smokeless Tobacco Never     Review of Systems   Constitutional: Negative.    HENT: Negative.     Eyes: Negative.    Respiratory: Negative.     Cardiovascular: Negative.    Endocrine: Negative.    Genitourinary: Negative.    Musculoskeletal: Negative.    Skin:  Positive for rash.   Allergic/Immunologic: Negative.    Neurological: Negative.    Hematological: Negative.    Psychiatric/Behavioral: Negative.       Vitals:    01/05/24 1038 01/05/24 1055   BP: (!) 138/94 138/80   Site: Left Upper Arm Left Upper Arm   Position: Sitting Sitting   Cuff Size: Large Adult Medium Adult   Pulse: 68 72   Temp: 98.8 °F (37.1 °C)    TempSrc: Oral    SpO2: 96%    Weight: 105.2 kg (232 lb)    Height: 1.549 m (5' 1\")           Physical Exam  Vitals and nursing note reviewed.   Constitutional:       General: She is not in acute distress.     Appearance: Normal appearance. She is not ill-appearing, toxic-appearing or diaphoretic.      Comments: BMI 43.84.   HENT:      Nose: Nose normal.   Eyes:      General: No scleral icterus.     Pupils: Pupils are equal, round, and reactive to light.   Neck:      Vascular: No carotid bruit.   Cardiovascular:      Rate and Rhythm: Normal rate and regular rhythm.      Pulses: Normal pulses.      Heart sounds: Normal heart sounds.   Pulmonary:      Effort: Pulmonary effort is normal.      Breath sounds: Normal

## 2024-01-08 NOTE — PROGRESS NOTES
Chiara Urias (: 1940) is a 83 y.o. female, new patient, here for evaluation of the following chief complaint(s):  No chief complaint on file.       ASSESSMENT/PLAN:  Below is the assessment and plan developed based on review of pertinent history, physical exam, labs, studies, and medications.  Available imaging was independently reviewed including 3 views of the right knee which showed severe tricompartmental osteoarthritis, most significant in the lateral compartment with a large osteophyte formation.  Discussed diagnosis of right knee severe osteoarthritis with associated Baker's cyst.  Discussed that the Baker's cyst is a benign fluid collection that commonly occurs with knee osteoarthritis.  We normally do not direct any treatment towards the Baker's cyst beyond what is used for osteoarthritis.  Discussed that the treatment for osteoarthritis is rest, activity modification including avoiding high impact activities, anti-inflammatories which she is unable to take, tramadol sometimes helps with severe pain and a prescription was sent to her pharmacy, recommend that she continue using an assistive device with a cane, discussed a repeat steroid or hyaluronic acid injection.  The patient is currently seeing Dr. Bradford who is getting the hyaluronic acid injections approved by her insurance for March.  Recommend that she continue with this course.  If at any point she desires to consider right knee replacement she can follow-up as needed with Dr. Smalls.  All questions were answered.    1. Synovial cyst of right popliteal space  -     traMADol (ULTRAM) 50 MG tablet; Take 1 tablet by mouth every 8 hours as needed for Pain for up to 7 days. Intended supply: 3 days. Take lowest dose possible to manage pain Max Daily Amount: 150 mg, Disp-21 tablet, R-0Normal  2. Primary osteoarthritis of right knee  -     traMADol (ULTRAM) 50 MG tablet; Take 1 tablet by mouth every 8 hours as needed for Pain for up to 7 days.

## 2024-01-10 ENCOUNTER — OFFICE VISIT (OUTPATIENT)
Age: 84
End: 2024-01-10

## 2024-01-10 VITALS — HEIGHT: 61 IN | BODY MASS INDEX: 43.8 KG/M2 | WEIGHT: 232 LBS

## 2024-01-10 DIAGNOSIS — M17.11 PRIMARY OSTEOARTHRITIS OF RIGHT KNEE: ICD-10-CM

## 2024-01-10 DIAGNOSIS — M71.21 SYNOVIAL CYST OF RIGHT POPLITEAL SPACE: Primary | ICD-10-CM

## 2024-01-10 RX ORDER — TRAMADOL HYDROCHLORIDE 50 MG/1
50 TABLET ORAL EVERY 8 HOURS PRN
Qty: 21 TABLET | Refills: 0 | Status: SHIPPED | OUTPATIENT
Start: 2024-01-10 | End: 2024-01-17

## 2024-01-10 ASSESSMENT — PATIENT HEALTH QUESTIONNAIRE - PHQ9
SUM OF ALL RESPONSES TO PHQ QUESTIONS 1-9: 0
1. LITTLE INTEREST OR PLEASURE IN DOING THINGS: 0

## 2024-03-01 RX ORDER — EMPAGLIFLOZIN 25 MG/1
TABLET, FILM COATED ORAL
Qty: 90 TABLET | Refills: 0 | Status: SHIPPED | OUTPATIENT
Start: 2024-03-01

## 2024-03-24 RX ORDER — PRAVASTATIN SODIUM 40 MG
TABLET ORAL
Qty: 90 TABLET | Refills: 1 | Status: SHIPPED | OUTPATIENT
Start: 2024-03-24

## 2024-04-05 RX ORDER — OMEPRAZOLE 40 MG/1
CAPSULE, DELAYED RELEASE ORAL
Qty: 90 CAPSULE | Refills: 0 | Status: SHIPPED | OUTPATIENT
Start: 2024-04-05

## 2024-04-19 PROBLEM — G89.29 CHRONIC PAIN OF LEFT ANKLE: Status: RESOLVED | Noted: 2021-01-06 | Resolved: 2024-04-19

## 2024-04-19 PROBLEM — M54.16 LUMBAR RADICULOPATHY: Status: RESOLVED | Noted: 2021-01-06 | Resolved: 2024-04-19

## 2024-04-19 PROBLEM — R42 VERTIGO: Status: RESOLVED | Noted: 2022-07-10 | Resolved: 2024-04-19

## 2024-04-19 PROBLEM — M25.572 CHRONIC PAIN OF LEFT ANKLE: Status: RESOLVED | Noted: 2021-01-06 | Resolved: 2024-04-19

## 2024-04-23 RX ORDER — ALLOPURINOL 100 MG/1
TABLET ORAL
Qty: 180 TABLET | Refills: 1 | Status: SHIPPED | OUTPATIENT
Start: 2024-04-23

## 2024-05-20 PROBLEM — N18.31 STAGE 3A CHRONIC KIDNEY DISEASE (HCC): Status: ACTIVE | Noted: 2024-05-20

## 2024-05-20 PROBLEM — N18.30 CHRONIC RENAL DISEASE, STAGE III (HCC): Status: RESOLVED | Noted: 2023-09-05 | Resolved: 2024-05-20

## 2024-05-24 ENCOUNTER — OFFICE VISIT (OUTPATIENT)
Facility: CLINIC | Age: 84
End: 2024-05-24

## 2024-05-24 VITALS
WEIGHT: 223.8 LBS | HEIGHT: 61 IN | HEART RATE: 60 BPM | TEMPERATURE: 97.7 F | RESPIRATION RATE: 18 BRPM | SYSTOLIC BLOOD PRESSURE: 118 MMHG | DIASTOLIC BLOOD PRESSURE: 62 MMHG | BODY MASS INDEX: 42.25 KG/M2 | OXYGEN SATURATION: 96 %

## 2024-05-24 DIAGNOSIS — M47.26 OSTEOARTHRITIS OF SPINE WITH RADICULOPATHY, LUMBAR REGION: ICD-10-CM

## 2024-05-24 DIAGNOSIS — J30.9 ALLERGIC RHINITIS, UNSPECIFIED SEASONALITY, UNSPECIFIED TRIGGER: ICD-10-CM

## 2024-05-24 DIAGNOSIS — K21.9 GASTROESOPHAGEAL REFLUX DISEASE WITHOUT ESOPHAGITIS: ICD-10-CM

## 2024-05-24 DIAGNOSIS — I87.2 VENOUS INSUFFICIENCY OF BOTH LOWER EXTREMITIES: ICD-10-CM

## 2024-05-24 DIAGNOSIS — I10 ESSENTIAL HYPERTENSION: Primary | ICD-10-CM

## 2024-05-24 DIAGNOSIS — M1A.9XX0 CHRONIC GOUT WITHOUT TOPHUS, UNSPECIFIED CAUSE, UNSPECIFIED SITE: ICD-10-CM

## 2024-05-24 DIAGNOSIS — E66.01 OBESITY, CLASS III, BMI 40-49.9 (MORBID OBESITY) (HCC): ICD-10-CM

## 2024-05-24 DIAGNOSIS — E11.9 CONTROLLED TYPE 2 DIABETES MELLITUS WITHOUT COMPLICATION, WITHOUT LONG-TERM CURRENT USE OF INSULIN (HCC): ICD-10-CM

## 2024-05-24 DIAGNOSIS — N18.31 STAGE 3A CHRONIC KIDNEY DISEASE (HCC): ICD-10-CM

## 2024-05-24 DIAGNOSIS — E78.00 PURE HYPERCHOLESTEROLEMIA: ICD-10-CM

## 2024-05-24 DIAGNOSIS — Z86.2 HISTORY OF SARCOIDOSIS: ICD-10-CM

## 2024-05-24 DIAGNOSIS — I25.9 ISCHEMIC HEART DISEASE: ICD-10-CM

## 2024-05-24 DIAGNOSIS — J45.20 MILD INTERMITTENT ASTHMA WITHOUT COMPLICATION: ICD-10-CM

## 2024-05-24 DIAGNOSIS — M15.9 PRIMARY OSTEOARTHRITIS INVOLVING MULTIPLE JOINTS: ICD-10-CM

## 2024-05-24 LAB
GLUCOSE, POC: 118 MG/DL
HBA1C MFR BLD: 6.3 %

## 2024-05-24 RX ORDER — CETIRIZINE HYDROCHLORIDE 10 MG/1
10 TABLET ORAL DAILY PRN
COMMUNITY

## 2024-05-24 ASSESSMENT — ENCOUNTER SYMPTOMS
ALLERGIC/IMMUNOLOGIC NEGATIVE: 1
RESPIRATORY NEGATIVE: 1
EYES NEGATIVE: 1
GASTROINTESTINAL NEGATIVE: 1

## 2024-05-24 NOTE — PROGRESS NOTES
\"Have you been to the ER, urgent care clinic since your last visit?  Hospitalized since your last visit?\"    NO    “Have you seen or consulted any other health care providers outside of [facility] since your last visit?”    YES - When: approximately 2 months ago.  Where and Why: Dr. Bradford for a follow up.    Chief Complaint   Patient presents with    Follow-up Chronic Condition     BP (!) 119/58 (Site: Left Upper Arm, Position: Sitting, Cuff Size: Large Adult)   Pulse 60   Temp 97.7 °F (36.5 °C) (Oral)   Resp 18   Ht 1.549 m (5' 1\")   Wt 101.5 kg (223 lb 12.8 oz)   SpO2 96%   BMI 42.29 kg/m²           Click Here for Release of Records Request    No

## 2024-05-24 NOTE — PROGRESS NOTES
Chiara Urias (: 1940) is a 83 y.o. female, Established patient patient, here for evaluation of the following chief complaint(s):  Follow-up Chronic Condition         ASSESSMENT/PLAN:  Below is the assessment and plan developed based on review of pertinent history, physical exam, labs, studies, and medications.      1. Essential hypertension  2. Controlled type 2 diabetes mellitus without complication, without long-term current use of insulin ([facility])  -     AMB POC GLUCOSE BLOOD, BY GLUCOSE MONITORING DEVICE  -     AMB POC HEMOGLOBIN A1C  3. Pure hypercholesterolemia  4. Stage 3a chronic kidney disease (HCC)  5. Ischemic heart disease  6. Mild intermittent asthma without complication  7. Allergic rhinitis, unspecified seasonality, unspecified trigger  8. Gastroesophageal reflux disease without esophagitis  9. Chronic gout without tophus, unspecified cause, unspecified site  10. Primary osteoarthritis involving multiple joints  11. Venous insufficiency of both lower extremities  12. Osteoarthritis of spine with radiculopathy, lumbar region  13. Obesity, Class III, BMI 40-49.9 (morbid obesity) ([facility])  14. History of sarcoidosis      Hypertension, well-controlled, continue current dose of losartan hydrochlorothiazide, carvedilol.    Type 2 diabetes mellitus, controlled, non-insulin-dependent,    Today her random blood sugar 118 and hemoglobin A1c 6.3%, she is taking Jardiance without any side effects, complemented her, continue diet low in sugar starch.  Continue low-dose aspirin and statin and she is going to have cataract removal with ophthalmologist in right eye.      Hypercholesterolemia with ischemic heart disease with normal EF and she had stress test and echo in 2023 follows cardiologist Dr. Fuentes and her EF was normal with severe LVH getting Imdur and statin.  Diet low in greasy oily fried food.    Allergies getting fluticasone nasal spray.    GERD getting omeprazole    Hypokalemia getting

## 2024-05-25 LAB
ALBUMIN SERPL-MCNC: 4.1 G/DL (ref 3.7–4.7)
ALBUMIN/GLOB SERPL: 1.9 {RATIO} (ref 1.2–2.2)
ALP SERPL-CCNC: 45 IU/L (ref 44–121)
ALT SERPL-CCNC: 19 IU/L (ref 0–32)
AST SERPL-CCNC: 22 IU/L (ref 0–40)
BASOPHILS # BLD AUTO: 0 X10E3/UL (ref 0–0.2)
BASOPHILS NFR BLD AUTO: 0 %
BILIRUB SERPL-MCNC: 0.4 MG/DL (ref 0–1.2)
BUN SERPL-MCNC: 26 MG/DL (ref 8–27)
BUN/CREAT SERPL: 21 (ref 12–28)
CALCIUM SERPL-MCNC: 9.1 MG/DL (ref 8.7–10.3)
CHLORIDE SERPL-SCNC: 101 MMOL/L (ref 96–106)
CHOLEST SERPL-MCNC: 159 MG/DL (ref 100–199)
CO2 SERPL-SCNC: 28 MMOL/L (ref 20–29)
CREAT SERPL-MCNC: 1.23 MG/DL (ref 0.57–1)
EGFRCR SERPLBLD CKD-EPI 2021: 44 ML/MIN/1.73
EOSINOPHIL # BLD AUTO: 0.2 X10E3/UL (ref 0–0.4)
EOSINOPHIL NFR BLD AUTO: 2 %
ERYTHROCYTE [DISTWIDTH] IN BLOOD BY AUTOMATED COUNT: 15.6 % (ref 11.7–15.4)
GLOBULIN SER CALC-MCNC: 2.2 G/DL (ref 1.5–4.5)
GLUCOSE SERPL-MCNC: 89 MG/DL (ref 70–99)
HBA1C MFR BLD: 6.3 % (ref 4.8–5.6)
HCT VFR BLD AUTO: 42.6 % (ref 34–46.6)
HDLC SERPL-MCNC: 58 MG/DL
HGB BLD-MCNC: 13.1 G/DL (ref 11.1–15.9)
IMM GRANULOCYTES # BLD AUTO: 0 X10E3/UL (ref 0–0.1)
IMM GRANULOCYTES NFR BLD AUTO: 0 %
LDLC SERPL CALC-MCNC: 90 MG/DL (ref 0–99)
LYMPHOCYTES # BLD AUTO: 2.6 X10E3/UL (ref 0.7–3.1)
LYMPHOCYTES NFR BLD AUTO: 27 %
MCH RBC QN AUTO: 23.9 PG (ref 26.6–33)
MCHC RBC AUTO-ENTMCNC: 30.8 G/DL (ref 31.5–35.7)
MCV RBC AUTO: 78 FL (ref 79–97)
MONOCYTES # BLD AUTO: 0.8 X10E3/UL (ref 0.1–0.9)
MONOCYTES NFR BLD AUTO: 9 %
NEUTROPHILS # BLD AUTO: 5.9 X10E3/UL (ref 1.4–7)
NEUTROPHILS NFR BLD AUTO: 62 %
PLATELET # BLD AUTO: 267 X10E3/UL (ref 150–450)
POTASSIUM SERPL-SCNC: 3.6 MMOL/L (ref 3.5–5.2)
PROT SERPL-MCNC: 6.3 G/DL (ref 6–8.5)
RBC # BLD AUTO: 5.48 X10E6/UL (ref 3.77–5.28)
SODIUM SERPL-SCNC: 144 MMOL/L (ref 134–144)
TRIGL SERPL-MCNC: 53 MG/DL (ref 0–149)
VLDLC SERPL CALC-MCNC: 11 MG/DL (ref 5–40)
WBC # BLD AUTO: 9.5 X10E3/UL (ref 3.4–10.8)

## 2024-05-28 RX ORDER — LOSARTAN POTASSIUM AND HYDROCHLOROTHIAZIDE 25; 100 MG/1; MG/1
1 TABLET ORAL EVERY MORNING
Qty: 90 TABLET | Refills: 2 | Status: SHIPPED | OUTPATIENT
Start: 2024-05-28

## 2024-05-28 RX ORDER — EMPAGLIFLOZIN 25 MG/1
TABLET, FILM COATED ORAL
Qty: 90 TABLET | Refills: 2 | Status: SHIPPED | OUTPATIENT
Start: 2024-05-28

## 2024-07-07 ENCOUNTER — HOSPITAL ENCOUNTER (EMERGENCY)
Facility: HOSPITAL | Age: 84
Discharge: HOME | End: 2024-07-07
Attending: EMERGENCY MEDICINE
Payer: MEDICARE

## 2024-07-07 VITALS
SYSTOLIC BLOOD PRESSURE: 126 MMHG | OXYGEN SATURATION: 96 % | TEMPERATURE: 98.9 F | WEIGHT: 223 LBS | HEIGHT: 61 IN | RESPIRATION RATE: 18 BRPM | HEART RATE: 69 BPM | BODY MASS INDEX: 42.1 KG/M2 | DIASTOLIC BLOOD PRESSURE: 63 MMHG

## 2024-07-07 DIAGNOSIS — J02.9 SORE THROAT: ICD-10-CM

## 2024-07-07 DIAGNOSIS — R09.82 POST-NASAL DRIP: ICD-10-CM

## 2024-07-07 DIAGNOSIS — J01.00 ACUTE MAXILLARY SINUSITIS, RECURRENCE NOT SPECIFIED: Primary | ICD-10-CM

## 2024-07-07 LAB
FLUAV RNA SPEC QL NAA+PROBE: NOT DETECTED
FLUBV RNA SPEC QL NAA+PROBE: NOT DETECTED
SARS-COV-2 RNA RESP QL NAA+PROBE: NOT DETECTED

## 2024-07-07 PROCEDURE — 87636 SARSCOV2 & INF A&B AMP PRB: CPT

## 2024-07-07 PROCEDURE — 99283 EMERGENCY DEPT VISIT LOW MDM: CPT

## 2024-07-07 RX ORDER — AMOXICILLIN AND CLAVULANATE POTASSIUM 875; 125 MG/1; MG/1
1 TABLET, FILM COATED ORAL 2 TIMES DAILY
Qty: 20 TABLET | Refills: 0 | Status: SHIPPED | OUTPATIENT
Start: 2024-07-07 | End: 2024-07-17

## 2024-07-07 ASSESSMENT — LIFESTYLE VARIABLES
HOW MANY STANDARD DRINKS CONTAINING ALCOHOL DO YOU HAVE ON A TYPICAL DAY: PATIENT DOES NOT DRINK
HOW OFTEN DO YOU HAVE A DRINK CONTAINING ALCOHOL: NEVER

## 2024-07-07 ASSESSMENT — PAIN - FUNCTIONAL ASSESSMENT: PAIN_FUNCTIONAL_ASSESSMENT: 0-10

## 2024-07-07 ASSESSMENT — PAIN SCALES - GENERAL: PAINLEVEL_OUTOF10: 8

## 2024-07-07 NOTE — ED TRIAGE NOTES
Pt has had a cough and sore throat for a week.  She states that she has been coughing up green/yellow phlegm.  Her throat hurts to the point it hurts to swallow

## 2024-07-07 NOTE — DISCHARGE INSTRUCTIONS
Thank you for choosing our Emergency Department for your care.  It is our privilege to care for you in your time of need.  In the next several days, you may receive a survey via email or mailed to your home about your experience with our team.  We would greatly appreciate you taking a few minutes to complete the survey, as we use this information to learn what we have done well and what we could be doing better. Thank you for trusting us with your care!    Below you will find a list of your tests from today's visit.   Labs  Recent Results (from the past 12 hour(s))   COVID-19 & Influenza Combo    Collection Time: 07/07/24  9:18 AM    Specimen: Nasopharyngeal   Result Value Ref Range    SARS-CoV-2, PCR Not Detected Not Detected      Rapid Influenza A By PCR Not Detected Not Detected      Rapid Influenza B By PCR Not Detected Not Detected         Radiologic Studies  No orders to display     ------------------------------------------------------------------------------------------------------------  The evaluation and treatment you received in the Emergency Department were for an urgent problem. It is important that you follow-up with a doctor, nurse practitioner, or physician assistant to:  (1) confirm your diagnosis,  (2) re-evaluation of changes in your illness and treatment, and (3) for ongoing care. Please take your discharge instructions with you when you go to your follow-up appointment.     If you have any problem arranging a follow-up appointment, contact us!  If your symptoms become worse or you do not improve as expected, please return to us. We are available 24 hours a day.     If a prescription has been provided, please fill it as soon as possible to prevent a delay in treatment. If you have any questions or reservations about taking the medication due to side effects or interactions with other medications, please call your primary care provider or contact us directly.  Again, THANK YOU for choosing us

## 2024-07-09 NOTE — ED PROVIDER NOTES
[facility] EMERGENCY DEPT  EMERGENCY DEPARTMENT HISTORY AND PHYSICAL EXAM      Date: 7/7/2024  Patient Name: Chiara Urias  MRN: [id number]  YOB: 1940  Date of evaluation: 7/7/2024  Provider: Genevieve Willis MD   Note Started: 11:49 PM EDT 7/8/24    HISTORY OF PRESENT ILLNESS     Chief Complaint   Patient presents with    Pharyngitis       History Provided By: Patient    HPI: Chiara Urias is a 83 y.o. female with h/o rhinitis, DM, HTN, presents with sore throat, facial pain and pressure, post-nasal drip and intermittent chills for a good week.    PAST MEDICAL HISTORY   Past Medical History:  Past Medical History:   Diagnosis Date    Allergic rhinitis     Angina pectoris (HCC)     Arthritis     Cardiac murmur     Chronic ischemic heart disease     Diabetes (HCC)     Diverticulitis     Edema of left lower leg     GERD (gastroesophageal reflux disease)     H/O Clostridium difficile infection     Hyperlipidemia     Hypertension     Knee pain     Left tibialis posterior tendinitis     Lumbar radiculopathy     Morbid obesity (HCC)     Osteoarthritis     Sarcoidosis     Shortness of breath     Spondylosis        Past Surgical History:  Past Surgical History:   Procedure Laterality Date    BREAST REDUCTION SURGERY      30 yrs ago    CHOLECYSTECTOMY      CYST REMOVAL      GI  2012    hx of bowel resection from diverticulitits    HYSTERECTOMY (CERVIX STATUS UNKNOWN)      IR CHOLECYSTOSTOMY PERCUTANEOUS COMPLETE         Family History:  Family History   Family history unknown: Yes       Social History:  Social History     Tobacco Use    Smoking status: Never    Smokeless tobacco: Never   Vaping Use    Vaping Use: Never used   Substance Use Topics    Alcohol use: Never    Drug use: Never       Allergies:  Allergies   Allergen Reactions    Clindamycin Diarrhea and Seizure     Reaction Type: Allergy       PCP: Rani Yip MD    Current Meds:   No current facility-administered medications for this encounter.     Current

## 2024-07-29 RX ORDER — OMEPRAZOLE 40 MG/1
CAPSULE, DELAYED RELEASE ORAL
Qty: 90 CAPSULE | Refills: 1 | Status: SHIPPED | OUTPATIENT
Start: 2024-07-29

## 2024-07-29 RX ORDER — POTASSIUM CHLORIDE 750 MG/1
TABLET, EXTENDED RELEASE ORAL
Qty: 90 TABLET | Refills: 0 | Status: SHIPPED | OUTPATIENT
Start: 2024-07-29

## 2024-09-12 RX ORDER — CARVEDILOL 12.5 MG/1
TABLET ORAL
Qty: 180 TABLET | Refills: 2 | Status: SHIPPED | OUTPATIENT
Start: 2024-09-12

## 2024-09-28 PROBLEM — Z11.59 NEED FOR HEPATITIS C SCREENING TEST: Status: ACTIVE | Noted: 2024-09-28

## 2024-10-02 ENCOUNTER — OFFICE VISIT (OUTPATIENT)
Facility: CLINIC | Age: 84
End: 2024-10-02

## 2024-10-02 VITALS
DIASTOLIC BLOOD PRESSURE: 70 MMHG | SYSTOLIC BLOOD PRESSURE: 124 MMHG | RESPIRATION RATE: 18 BRPM | BODY MASS INDEX: 43.31 KG/M2 | OXYGEN SATURATION: 97 % | WEIGHT: 229.4 LBS | HEART RATE: 60 BPM | HEIGHT: 61 IN

## 2024-10-02 DIAGNOSIS — E11.9 CONTROLLED TYPE 2 DIABETES MELLITUS WITHOUT COMPLICATION, WITHOUT LONG-TERM CURRENT USE OF INSULIN (HCC): ICD-10-CM

## 2024-10-02 DIAGNOSIS — Z86.2 HISTORY OF SARCOIDOSIS: ICD-10-CM

## 2024-10-02 DIAGNOSIS — Z11.59 NEED FOR HEPATITIS C SCREENING TEST: ICD-10-CM

## 2024-10-02 DIAGNOSIS — E78.00 PURE HYPERCHOLESTEROLEMIA: ICD-10-CM

## 2024-10-02 DIAGNOSIS — N18.31 STAGE 3A CHRONIC KIDNEY DISEASE (HCC): ICD-10-CM

## 2024-10-02 DIAGNOSIS — I10 ESSENTIAL HYPERTENSION: ICD-10-CM

## 2024-10-02 DIAGNOSIS — Z79.899 ON STATIN THERAPY: ICD-10-CM

## 2024-10-02 DIAGNOSIS — E66.01 OBESITY, CLASS III, BMI 40-49.9 (MORBID OBESITY): ICD-10-CM

## 2024-10-02 DIAGNOSIS — E11.69 TYPE 2 DIABETES MELLITUS WITH OTHER SPECIFIED COMPLICATION, WITH LONG-TERM CURRENT USE OF INSULIN (HCC): Primary | ICD-10-CM

## 2024-10-02 DIAGNOSIS — Z79.4 TYPE 2 DIABETES MELLITUS WITH OTHER SPECIFIED COMPLICATION, WITH LONG-TERM CURRENT USE OF INSULIN (HCC): Primary | ICD-10-CM

## 2024-10-02 DIAGNOSIS — Z78.0 POST-MENOPAUSAL: ICD-10-CM

## 2024-10-02 DIAGNOSIS — J30.9 ALLERGIC RHINITIS, UNSPECIFIED SEASONALITY, UNSPECIFIED TRIGGER: ICD-10-CM

## 2024-10-02 DIAGNOSIS — G47.62 NOCTURNAL LEG CRAMPS: ICD-10-CM

## 2024-10-02 DIAGNOSIS — J45.20 MILD INTERMITTENT ASTHMA WITHOUT COMPLICATION: ICD-10-CM

## 2024-10-02 DIAGNOSIS — I25.9 ISCHEMIC HEART DISEASE: ICD-10-CM

## 2024-10-02 DIAGNOSIS — I87.2 VENOUS INSUFFICIENCY OF BOTH LOWER EXTREMITIES: ICD-10-CM

## 2024-10-02 DIAGNOSIS — M47.26 OSTEOARTHRITIS OF SPINE WITH RADICULOPATHY, LUMBAR REGION: ICD-10-CM

## 2024-10-02 DIAGNOSIS — M71.21 SYNOVIAL CYST OF RIGHT POPLITEAL SPACE: ICD-10-CM

## 2024-10-02 DIAGNOSIS — Z00.00 MEDICARE ANNUAL WELLNESS VISIT, SUBSEQUENT: ICD-10-CM

## 2024-10-02 LAB
GLUCOSE, POC: 95 MG/DL
HBA1C MFR BLD: 6.4 %

## 2024-10-02 RX ORDER — GABAPENTIN 300 MG/1
300 CAPSULE ORAL NIGHTLY
COMMUNITY
Start: 2024-08-05

## 2024-10-02 RX ORDER — GLUCOSAMINE HCL/CHONDROITIN SU 500-400 MG
CAPSULE ORAL
Qty: 100 STRIP | Refills: 5 | Status: SHIPPED | OUTPATIENT
Start: 2024-10-02

## 2024-10-02 RX ORDER — DIPHENHYDRAMINE HYDROCHLORIDE 25 MG/1
CAPSULE, LIQUID FILLED ORAL
Qty: 1 KIT | Refills: 1 | Status: SHIPPED | OUTPATIENT
Start: 2024-10-02 | End: 2024-10-03

## 2024-10-02 RX ORDER — BLOOD SUGAR DIAGNOSTIC
STRIP MISCELLANEOUS
Qty: 100 EACH | Refills: 5 | Status: SHIPPED | OUTPATIENT
Start: 2024-10-02

## 2024-10-02 RX ORDER — BISMUTH SUBSALICYLATE 262MG/15ML
SUSPENSION, ORAL (FINAL DOSE FORM) ORAL
Qty: 100 EACH | Refills: 5 | Status: SHIPPED | OUTPATIENT
Start: 2024-10-02

## 2024-10-02 SDOH — ECONOMIC STABILITY: INCOME INSECURITY: HOW HARD IS IT FOR YOU TO PAY FOR THE VERY BASICS LIKE FOOD, HOUSING, MEDICAL CARE, AND HEATING?: NOT HARD AT ALL

## 2024-10-02 SDOH — ECONOMIC STABILITY: FOOD INSECURITY: WITHIN THE PAST 12 MONTHS, THE FOOD YOU BOUGHT JUST DIDN'T LAST AND YOU DIDN'T HAVE MONEY TO GET MORE.: NEVER TRUE

## 2024-10-02 SDOH — ECONOMIC STABILITY: FOOD INSECURITY: WITHIN THE PAST 12 MONTHS, YOU WORRIED THAT YOUR FOOD WOULD RUN OUT BEFORE YOU GOT MONEY TO BUY MORE.: NEVER TRUE

## 2024-10-02 ASSESSMENT — PATIENT HEALTH QUESTIONNAIRE - PHQ9
SUM OF ALL RESPONSES TO PHQ9 QUESTIONS 1 & 2: 0
SUM OF ALL RESPONSES TO PHQ QUESTIONS 1-9: 0
1. LITTLE INTEREST OR PLEASURE IN DOING THINGS: NOT AT ALL
SUM OF ALL RESPONSES TO PHQ QUESTIONS 1-9: 0
2. FEELING DOWN, DEPRESSED OR HOPELESS: NOT AT ALL

## 2024-10-02 NOTE — PROGRESS NOTES
Chief Complaint   Patient presents with    Medicare AWV     BP (!) 160/90 (Site: Left Upper Arm, Position: Sitting)   Pulse 54   Resp 18   Ht 1.549 m (5' 0.98\")   Wt 104.1 kg (229 lb 6.4 oz)   SpO2 97%   BMI 43.37 kg/m²       \"Have you been to the ER, urgent care clinic since your last visit?  Hospitalized since your last visit?\"    Yes sinus infection 7/7/2024    “Have you seen or consulted any other health care providers outside our system since your last visit?”    Yes Dr Bradford July for follow up and DR Fuentes 9/12/2024 follow up           
80-4.5 MCG/ACT AERO Inhale 2 puffs into the lungs daily as needed Yes Provider, MD Chava   Multiple Vitamin (DAILY VITES) TABS Take 1 tablet by mouth daily Yes Provider, MD Chava   aspirin 81 MG chewable tablet Take 1 tablet by mouth daily Yes Automatic Reconciliation, Ar   isosorbide mononitrate (IMDUR) 30 MG extended release tablet Take 1 tablet by mouth daily Yes Automatic Reconciliation, Ar   nitroGLYCERIN (NITROSTAT) 0.3 MG SL tablet Place 1 tablet under the tongue Yes Automatic Reconciliation, Ar       CareTeam (Including outside providers/suppliers regularly involved in providing care):   Patient Care Team:  Rani Yip MD as PCP - General  Rani Yip MD as PCP - Empaneled Provider      Reviewed and updated this visit:  Tobacco  Allergies  Meds  Problems  Med Hx  Surg Hx  Soc Hx  Fam Hx

## 2024-10-02 NOTE — PATIENT INSTRUCTIONS

## 2024-10-03 RX ORDER — BLOOD-GLUCOSE METER
EACH MISCELLANEOUS
Qty: 1 KIT | Refills: 1 | Status: SHIPPED | OUTPATIENT
Start: 2024-10-03

## 2024-10-24 RX ORDER — ALLOPURINOL 100 MG/1
TABLET ORAL
Qty: 180 TABLET | Refills: 2 | Status: SHIPPED | OUTPATIENT
Start: 2024-10-24

## 2024-10-24 RX ORDER — PRAVASTATIN SODIUM 40 MG
TABLET ORAL
Qty: 90 TABLET | Refills: 2 | Status: SHIPPED | OUTPATIENT
Start: 2024-10-24

## 2024-10-28 PROBLEM — Z11.59 NEED FOR HEPATITIS C SCREENING TEST: Status: RESOLVED | Noted: 2024-09-28 | Resolved: 2024-10-28

## 2024-11-01 PROBLEM — Z00.00 MEDICARE ANNUAL WELLNESS VISIT, SUBSEQUENT: Status: RESOLVED | Noted: 2024-10-02 | Resolved: 2024-11-01

## 2024-11-08 RX ORDER — POTASSIUM CHLORIDE 750 MG/1
TABLET, EXTENDED RELEASE ORAL
Qty: 90 TABLET | Refills: 2 | Status: SHIPPED | OUTPATIENT
Start: 2024-11-08

## 2024-12-30 ENCOUNTER — HOSPITAL ENCOUNTER (OUTPATIENT)
Facility: HOSPITAL | Age: 84
Discharge: HOME | End: 2025-01-02
Attending: INTERNAL MEDICINE
Payer: MEDICARE

## 2024-12-30 DIAGNOSIS — Z12.31 VISIT FOR SCREENING MAMMOGRAM: ICD-10-CM

## 2024-12-30 PROCEDURE — 77063 BREAST TOMOSYNTHESIS BI: CPT

## 2025-01-03 ENCOUNTER — HOSPITAL ENCOUNTER (OUTPATIENT)
Facility: HOSPITAL | Age: 85
Discharge: HOME | End: 2025-01-03
Attending: INTERNAL MEDICINE
Payer: MEDICARE

## 2025-01-03 DIAGNOSIS — Z78.0 POST-MENOPAUSAL: ICD-10-CM

## 2025-01-03 PROCEDURE — 77080 DXA BONE DENSITY AXIAL: CPT

## 2025-01-23 RX ORDER — OMEPRAZOLE 40 MG/1
40 CAPSULE, DELAYED RELEASE ORAL
Qty: 90 CAPSULE | Refills: 2 | Status: SHIPPED | OUTPATIENT
Start: 2025-01-23

## 2025-02-09 PROBLEM — G47.62 NOCTURNAL LEG CRAMPS: Status: RESOLVED | Noted: 2024-10-02 | Resolved: 2025-02-09

## 2025-02-11 ENCOUNTER — TELEPHONE (OUTPATIENT)
Facility: CLINIC | Age: 85
End: 2025-02-11

## 2025-02-11 RX ORDER — POTASSIUM CHLORIDE 750 MG/1
10 TABLET, EXTENDED RELEASE ORAL DAILY
Qty: 90 TABLET | Refills: 1 | Status: SHIPPED | OUTPATIENT
Start: 2025-02-11

## 2025-02-11 NOTE — TELEPHONE ENCOUNTER
Patient called requesting refills for the following medications:      empagliflozin (JARDIANCE) 25 MG tablet   90 tablet  2 refills        potassium chloride (KLOR-CON M) 10 MEQ extended release tablet   90 tablet  2 refills          Please send to [facility] on [facility] in [place], [place]

## 2025-02-18 ENCOUNTER — OFFICE VISIT (OUTPATIENT)
Age: 85
End: 2025-02-18
Payer: MEDICARE

## 2025-02-18 VITALS
SYSTOLIC BLOOD PRESSURE: 120 MMHG | HEIGHT: 61 IN | BODY MASS INDEX: 43.23 KG/M2 | OXYGEN SATURATION: 97 % | RESPIRATION RATE: 20 BRPM | HEART RATE: 69 BPM | DIASTOLIC BLOOD PRESSURE: 80 MMHG | WEIGHT: 229 LBS

## 2025-02-18 DIAGNOSIS — H60.8X1 CHRONIC ECZEMATOUS OTITIS EXTERNA OF RIGHT EAR: ICD-10-CM

## 2025-02-18 DIAGNOSIS — J31.0 CHRONIC RHINITIS: Primary | ICD-10-CM

## 2025-02-18 DIAGNOSIS — R09.82 PND (POST-NASAL DRIP): ICD-10-CM

## 2025-02-18 DIAGNOSIS — R09.89 CHRONIC THROAT CLEARING: ICD-10-CM

## 2025-02-18 PROCEDURE — 99214 OFFICE O/P EST MOD 30 MIN: CPT | Performed by: OTOLARYNGOLOGY

## 2025-02-18 PROCEDURE — 1159F MED LIST DOCD IN RCRD: CPT | Performed by: OTOLARYNGOLOGY

## 2025-02-18 PROCEDURE — 1036F TOBACCO NON-USER: CPT | Performed by: OTOLARYNGOLOGY

## 2025-02-18 PROCEDURE — G8417 CALC BMI ABV UP PARAM F/U: HCPCS | Performed by: OTOLARYNGOLOGY

## 2025-02-18 PROCEDURE — G8399 PT W/DXA RESULTS DOCUMENT: HCPCS | Performed by: OTOLARYNGOLOGY

## 2025-02-18 PROCEDURE — 3079F DIAST BP 80-89 MM HG: CPT | Performed by: OTOLARYNGOLOGY

## 2025-02-18 PROCEDURE — G8427 DOCREV CUR MEDS BY ELIG CLIN: HCPCS | Performed by: OTOLARYNGOLOGY

## 2025-02-18 PROCEDURE — 1126F AMNT PAIN NOTED NONE PRSNT: CPT | Performed by: OTOLARYNGOLOGY

## 2025-02-18 PROCEDURE — 1123F ACP DISCUSS/DSCN MKR DOCD: CPT | Performed by: OTOLARYNGOLOGY

## 2025-02-18 PROCEDURE — 1090F PRES/ABSN URINE INCON ASSESS: CPT | Performed by: OTOLARYNGOLOGY

## 2025-02-18 PROCEDURE — 3074F SYST BP LT 130 MM HG: CPT | Performed by: OTOLARYNGOLOGY

## 2025-02-18 RX ORDER — GUAIFENESIN 600 MG/1
600 TABLET, EXTENDED RELEASE ORAL 2 TIMES DAILY
Qty: 60 TABLET | Refills: 3 | Status: SHIPPED | OUTPATIENT
Start: 2025-02-18 | End: 2025-02-28

## 2025-02-18 RX ORDER — FLUOCINOLONE ACETONIDE 0.11 MG/ML
OIL AURICULAR (OTIC)
Qty: 20 ML | Refills: 1 | Status: SHIPPED | OUTPATIENT
Start: 2025-02-18

## 2025-02-18 ASSESSMENT — ENCOUNTER SYMPTOMS
BACK PAIN: 0
CHOKING: 0
SINUS PAIN: 1
COUGH: 0
APNEA: 0
WHEEZING: 0
SINUS PRESSURE: 0
SORE THROAT: 0
STRIDOR: 0
VOICE CHANGE: 0
NAUSEA: 0
SHORTNESS OF BREATH: 0
EYE DISCHARGE: 0
ABDOMINAL PAIN: 0
PHOTOPHOBIA: 0
EYE ITCHING: 0
VOMITING: 0
TROUBLE SWALLOWING: 0

## 2025-02-18 NOTE — PROGRESS NOTES
Identified pt with two pt identifiers(name and ). Reviewed record in preparation for visit and have obtained necessary documentation. All patient medications has been reviewed.  Chief Complaint   Patient presents with    Follow-up     ear & Throat , Sinus         Vitals:    25 0948   BP: 120/80   Pulse: 69   Resp: 20   SpO2: 97%                   Coordination of Care Questionnaire:   1) Have you been to an emergency room, urgent care, or hospitalized since your last visit?   no       2. Have seen or consulted any other health care provider since your last visit? no        Patient is accompanied by self I have received verbal consent from Chiara Urias to discuss any/all medical information while they are present in the room.

## 2025-02-18 NOTE — PROGRESS NOTES
Subjective:    Chiara Urias   84 y.o.   1940     New Patient Visit    Chief Complaint   Patient presents with    Follow-up     ear & Throat , Sinus        History of Present Illness:  2/18/25 - [facility] office  85 yo female presents with dry mouth in the morning, thick mucus in the morning, rhinorrhea, and a dry right ear.  She has been taking flonase nasal spray in the morning which does help some.  PCP also tried zyrtec.  She denies any purulent discharge.  She reports she had a sinus procedure back around 20 years ago.  She may have had allergy testing around that time.    Review of Systems  Review of Systems   Constitutional:  Negative for appetite change, chills, fatigue and fever.   HENT:  Positive for congestion and sinus pain. Negative for ear discharge, ear pain, nosebleeds, postnasal drip, sinus pressure, sneezing, sore throat, tinnitus, trouble swallowing and voice change.    Eyes:  Negative for photophobia, discharge, itching and visual disturbance.   Respiratory:  Negative for apnea, cough, choking, shortness of breath, wheezing and stridor.    Cardiovascular:  Negative for chest pain and palpitations.   Gastrointestinal:  Negative for abdominal pain, nausea and vomiting.   Endocrine: Negative for cold intolerance and heat intolerance.   Genitourinary:  Negative for difficulty urinating and dysuria.   Musculoskeletal:  Negative for arthralgias, back pain, gait problem, myalgias, neck pain and neck stiffness.   Skin:  Negative for rash and wound.   Allergic/Immunologic: Positive for environmental allergies. Negative for food allergies.   Neurological:  Negative for dizziness, speech difficulty, light-headedness and headaches.   Hematological:  Negative for adenopathy. Does not bruise/bleed easily.   Psychiatric/Behavioral:  Negative for confusion and sleep disturbance. The patient is not nervous/anxious.            Past Medical History:   Diagnosis Date    Allergic rhinitis     Angina pectoris (HCC)

## 2025-04-21 RX ORDER — LOSARTAN POTASSIUM AND HYDROCHLOROTHIAZIDE 25; 100 MG/1; MG/1
1 TABLET ORAL EVERY MORNING
Qty: 30 TABLET | Refills: 0 | Status: SHIPPED | OUTPATIENT
Start: 2025-04-21

## 2025-04-28 PROBLEM — E66.813 OBESITY, CLASS III, BMI 40-49.9 (MORBID OBESITY) (HCC): Status: RESOLVED | Noted: 2021-01-06 | Resolved: 2025-04-28

## 2025-04-30 RX ORDER — OMEPRAZOLE 40 MG/1
40 CAPSULE, DELAYED RELEASE ORAL
Qty: 90 CAPSULE | Refills: 0 | Status: SHIPPED | OUTPATIENT
Start: 2025-04-30

## 2025-04-30 NOTE — TELEPHONE ENCOUNTER
Called patient to reschedule appointment.    She is out of one medication that she needs refills on.

## 2025-05-03 ENCOUNTER — HOSPITAL ENCOUNTER (EMERGENCY)
Facility: HOSPITAL | Age: 85
Discharge: HOME | End: 2025-05-03
Payer: MEDICARE

## 2025-05-03 ENCOUNTER — TRANSCRIBE ORDERS (OUTPATIENT)
Facility: HOSPITAL | Age: 85
End: 2025-05-03

## 2025-05-03 ENCOUNTER — APPOINTMENT (OUTPATIENT)
Facility: HOSPITAL | Age: 85
End: 2025-05-03
Payer: MEDICARE

## 2025-05-03 ENCOUNTER — HOSPITAL ENCOUNTER (OUTPATIENT)
Facility: HOSPITAL | Age: 85
End: 2025-05-03
Payer: MEDICARE

## 2025-05-03 VITALS
RESPIRATION RATE: 16 BRPM | HEIGHT: 61 IN | OXYGEN SATURATION: 98 % | SYSTOLIC BLOOD PRESSURE: 130 MMHG | WEIGHT: 232 LBS | HEART RATE: 62 BPM | DIASTOLIC BLOOD PRESSURE: 64 MMHG | BODY MASS INDEX: 43.8 KG/M2 | TEMPERATURE: 98.8 F

## 2025-05-03 DIAGNOSIS — M79.604 PAIN OF RIGHT LOWER EXTREMITY: Primary | ICD-10-CM

## 2025-05-03 DIAGNOSIS — M79.604 RIGHT LEG PAIN: Primary | ICD-10-CM

## 2025-05-03 DIAGNOSIS — M79.604 PAIN OF RIGHT LOWER EXTREMITY: ICD-10-CM

## 2025-05-03 LAB
D DIMER PPP FEU-MCNC: 1.21 MG/L FEU (ref 0.19–0.5)
ECHO BSA: 2.13 M2

## 2025-05-03 PROCEDURE — 99284 EMERGENCY DEPT VISIT MOD MDM: CPT

## 2025-05-03 PROCEDURE — 73562 X-RAY EXAM OF KNEE 3: CPT

## 2025-05-03 PROCEDURE — 6370000000 HC RX 637 (ALT 250 FOR IP)

## 2025-05-03 PROCEDURE — 36415 COLL VENOUS BLD VENIPUNCTURE: CPT

## 2025-05-03 PROCEDURE — 93971 EXTREMITY STUDY: CPT

## 2025-05-03 PROCEDURE — 85379 FIBRIN DEGRADATION QUANT: CPT

## 2025-05-03 RX ORDER — ACETAMINOPHEN 500 MG
1000 TABLET ORAL
Status: COMPLETED | OUTPATIENT
Start: 2025-05-03 | End: 2025-05-03

## 2025-05-03 RX ADMIN — ACETAMINOPHEN 1000 MG: 500 TABLET ORAL at 14:56

## 2025-05-03 ASSESSMENT — PAIN DESCRIPTION - DESCRIPTORS: DESCRIPTORS: STABBING

## 2025-05-03 ASSESSMENT — PAIN DESCRIPTION - ONSET: ONSET: PROGRESSIVE

## 2025-05-03 ASSESSMENT — PAIN SCALES - GENERAL: PAINLEVEL_OUTOF10: 10

## 2025-05-03 ASSESSMENT — PAIN - FUNCTIONAL ASSESSMENT: PAIN_FUNCTIONAL_ASSESSMENT: PREVENTS OR INTERFERES WITH MANY ACTIVE NOT PASSIVE ACTIVITIES

## 2025-05-03 ASSESSMENT — PAIN DESCRIPTION - LOCATION: LOCATION: LEG

## 2025-05-03 ASSESSMENT — PAIN DESCRIPTION - FREQUENCY: FREQUENCY: CONTINUOUS

## 2025-05-03 ASSESSMENT — PAIN DESCRIPTION - ORIENTATION: ORIENTATION: RIGHT

## 2025-05-03 ASSESSMENT — PAIN DESCRIPTION - PAIN TYPE: TYPE: ACUTE PAIN

## 2025-05-03 NOTE — DISCHARGE INSTRUCTIONS
Thank you for choosing our Emergency Department for your care.  It is our privilege to care for you in your time of need.  In the next several days, you may receive a survey via email or mailed to your home about your experience with our team.  We would greatly appreciate you taking a few minutes to complete the survey, as we use this information to learn what we have done well and what we could be doing better. Thank you for trusting us with your care!    Below you will find a list of your tests from today's visit.   Labs and Radiology Studies  Recent Results (from the past 12 hours)   D-Dimer, Quantitative    Collection Time: 05/03/25  2:15 PM   Result Value Ref Range    D-Dimer, Quant 1.21 (H) 0.19 - 0.50 mg/L FEU     XR KNEE RIGHT (3 VIEWS)  Result Date: 5/3/2025  EXAM: XR KNEE RIGHT (3 VIEWS) INDICATION: knee pain. COMPARISON: December 2023. FINDINGS: Three views of the right knee demonstrate no fracture or other acute osseous or articular abnormality. There is moderate tricompartmental DJD.     No acute abnormality. Electronically signed by Mat Gallegos MD    ------------------------------------------------------------------------------------------------------------  The evaluation and treatment you received in the Emergency Department were for an urgent problem. It is important that you follow-up with a doctor, nurse practitioner, or physician assistant to:  (1) confirm your diagnosis,  (2) re-evaluation of changes in your illness and treatment, and (3) for ongoing care. Please take your discharge instructions with you when you go to your follow-up appointment.     If you have any problem arranging a follow-up appointment, contact us!  If your symptoms become worse or you do not improve as expected, please return to us. We are available 24 hours a day.     If a prescription has been provided, please fill it as soon as possible to prevent a delay in treatment. If you have any questions or reservations about

## 2025-05-03 NOTE — ED PROVIDER NOTES
[facility] EMERGENCY DEPT  EMERGENCY DEPARTMENT HISTORY AND PHYSICAL EXAM      Date of evaluation: 5/3/2025  Patient Name: Chiara Urias  Birthdate 1940  MRN: [id number]  ED Provider: MARISA Weiss   Note Started: 1:51 PM EDT 5/3/25    HISTORY OF PRESENT ILLNESS     Chief Complaint   Patient presents with    Leg Pain       History Provided By: Patient, only     HPI: Chiara Urias is a 84 y.o. female with past medical history as listed reviewed below, including Baker's cyst of the right leg, who presents to the ED complaining of right knee pain x 1 day. She localizes the pain to the area behind the right knee with some radiation into her calf.  She does report a history of arthritis in both knees and history of a Baker's cyst on the right side, however states that this pain is worse than anything she has experienced before. She denies any history of blood clots, denies any chest pain, shortness of breath, hemoptysis, recent travel/immobilization/surgery. She took gabapentin last night and tylenol this morning which provided minimal relief.     PAST MEDICAL HISTORY   Past Medical History:  Past Medical History:   Diagnosis Date    Allergic rhinitis     Angina pectoris     Arthritis     Cardiac murmur     Chronic ischemic heart disease     Diabetes (HCC)     Diverticulitis     Edema of left lower leg     GERD (gastroesophageal reflux disease)     H/O Clostridium difficile infection     Hyperlipidemia     Hypertension     Knee pain     Left tibialis posterior tendinitis     Lumbar radiculopathy     Morbid obesity (HCC)     Osteoarthritis     Sarcoidosis     Shortness of breath     Spondylosis        Past Surgical History:  Past Surgical History:   Procedure Laterality Date    BREAST REDUCTION SURGERY      30 yrs ago    CHOLECYSTECTOMY      CYST REMOVAL      GI  2012    hx of bowel resection from diverticulitits    HYSTERECTOMY (CERVIX STATUS UNKNOWN)      IR CHOLECYSTOSTOMY PERCUTANEOUS COMPLETE         Family  Year: No   Interpersonal Safety: Not At Risk (2023)    Interpersonal Safety Domain Source: IP Abuse Screening     Physical abuse: Denies     Verbal abuse: Denies     Emotional abuse: Denies     Financial abuse: Denies     Sexual abuse: Denies   Utilities: Not on file     PHYSICAL EXAM   Physical Exam  Vitals and nursing note reviewed.   Constitutional:       General: She is not in acute distress.     Appearance: Normal appearance. She is not ill-appearing, toxic-appearing or diaphoretic.      Comments: Antalgic gait, uses cane   Cardiovascular:      Rate and Rhythm: Normal rate and regular rhythm.      Heart sounds: No murmur heard.     No friction rub. No gallop.   Pulmonary:      Effort: Pulmonary effort is normal.      Breath sounds: Normal breath sounds. No wheezing, rhonchi or rales.   Musculoskeletal:      Comments: Small suprapatellar swelling over right knee, no ecchymosis, no redness or warmth. ROM of right knee limited by pain. Tender to palpation in the popliteal fossa and in the superior calf. Calf circumference equal bilaterally. DP pulse palpable. Sensation to light touch intact distally.     Skin:     General: Skin is warm and dry.   Neurological:      General: No focal deficit present.      Mental Status: She is alert and oriented to person, place, and time.   Psychiatric:         Mood and Affect: Mood normal.         Behavior: Behavior normal.         SCREENINGS              Active Cancer (treatment within last 6 months or palliative: 0  Bedridden > 3 days or major surgery within 12 weeks: 0  Calf swelling = or > 3 cm compared to asynmpotomatic calf (measured 10 cm below tibial tuberosity): 0  Collateral (nonvaricose) superficial veins present: 1  Entire leg swollen: 0  Localized tenderness along the deep venous system: 1  Pitting edema, confined to symptomatic le  Paralysis, paresis, or recent plaster immobilization of the lower extremity: 0  Previously documented DVT: 0  Alternative

## 2025-05-03 NOTE — ED TRIAGE NOTES
ED visit d/t (R) knee pain - onset of sxs, last night - Endorses, hx of baker cyst to affected site, reports severe pain to affected site leading to severe pain with movements, reports worsening swelling to bilat LE - baseline, using can for ambulation - Denies sob / cp / dizziness / coughing / fevers / chills / warmth nor redness to affected site;

## 2025-05-05 LAB — ECHO BSA: 2.13 M2

## 2025-05-05 PROCEDURE — 93971 EXTREMITY STUDY: CPT | Performed by: SURGERY

## 2025-05-07 ENCOUNTER — TELEPHONE (OUTPATIENT)
Facility: CLINIC | Age: 85
End: 2025-05-07

## 2025-05-07 NOTE — TELEPHONE ENCOUNTER
Patient stated that she was suppose to receive a glucose monitor but never received it at the Pharmacy ([facility]) please advise

## 2025-05-08 DIAGNOSIS — E11.69 TYPE 2 DIABETES MELLITUS WITH OTHER SPECIFIED COMPLICATION, WITHOUT LONG-TERM CURRENT USE OF INSULIN (HCC): ICD-10-CM

## 2025-05-08 DIAGNOSIS — E08.65 DIABETES MELLITUS DUE TO UNDERLYING CONDITION, UNCONTROLLED, WITH HYPERGLYCEMIA (HCC): Primary | ICD-10-CM

## 2025-05-08 RX ORDER — GLUCOSAMINE HCL/CHONDROITIN SU 500-400 MG
CAPSULE ORAL
Qty: 100 STRIP | Refills: 3 | Status: SHIPPED | OUTPATIENT
Start: 2025-05-08

## 2025-05-08 RX ORDER — BLOOD SUGAR DIAGNOSTIC
STRIP MISCELLANEOUS
Qty: 100 EACH | Refills: 3 | Status: SHIPPED | OUTPATIENT
Start: 2025-05-08

## 2025-05-08 RX ORDER — DIPHENHYDRAMINE HYDROCHLORIDE 25 MG/1
CAPSULE, LIQUID FILLED ORAL
Qty: 1 KIT | Refills: 2 | Status: SHIPPED | OUTPATIENT
Start: 2025-05-08

## 2025-05-08 RX ORDER — ALCOHOL ANTISEPTIC PADS
PADS, MEDICATED (EA) TOPICAL
Qty: 100 EACH | Refills: 3 | Status: SHIPPED | OUTPATIENT
Start: 2025-05-08

## 2025-05-12 DIAGNOSIS — E11.69 TYPE 2 DIABETES MELLITUS WITH OTHER SPECIFIED COMPLICATION, WITHOUT LONG-TERM CURRENT USE OF INSULIN (HCC): ICD-10-CM

## 2025-05-12 RX ORDER — GLUCOSAMINE HCL/CHONDROITIN SU 500-400 MG
CAPSULE ORAL
Qty: 100 STRIP | Refills: 3 | Status: SHIPPED | OUTPATIENT
Start: 2025-05-12

## 2025-05-21 PROBLEM — M79.604 PAIN OF RIGHT LOWER EXTREMITY: Status: ACTIVE | Noted: 2025-05-21

## 2025-05-21 RX ORDER — LOSARTAN POTASSIUM AND HYDROCHLOROTHIAZIDE 25; 100 MG/1; MG/1
1 TABLET ORAL EVERY MORNING
Qty: 90 TABLET | Refills: 2 | Status: SHIPPED | OUTPATIENT
Start: 2025-05-21

## 2025-06-06 RX ORDER — CARVEDILOL 12.5 MG/1
12.5 TABLET ORAL 2 TIMES DAILY WITH MEALS
Qty: 180 TABLET | Refills: 2 | Status: SHIPPED | OUTPATIENT
Start: 2025-06-06

## 2025-07-03 ENCOUNTER — HOSPITAL ENCOUNTER (EMERGENCY)
Facility: HOSPITAL | Age: 85
Discharge: HOME | End: 2025-07-03
Attending: EMERGENCY MEDICINE
Payer: MEDICARE

## 2025-07-03 ENCOUNTER — APPOINTMENT (OUTPATIENT)
Facility: HOSPITAL | Age: 85
End: 2025-07-03
Attending: EMERGENCY MEDICINE
Payer: MEDICARE

## 2025-07-03 VITALS
HEART RATE: 65 BPM | BODY MASS INDEX: 41.54 KG/M2 | DIASTOLIC BLOOD PRESSURE: 84 MMHG | RESPIRATION RATE: 20 BRPM | HEIGHT: 61 IN | TEMPERATURE: 98.2 F | SYSTOLIC BLOOD PRESSURE: 137 MMHG | OXYGEN SATURATION: 95 % | WEIGHT: 220 LBS

## 2025-07-03 DIAGNOSIS — M71.22 SYNOVIAL CYST OF LEFT POPLITEAL SPACE: Primary | ICD-10-CM

## 2025-07-03 DIAGNOSIS — M79.605 LEFT LEG PAIN: ICD-10-CM

## 2025-07-03 LAB — ECHO BSA: 2.07 M2

## 2025-07-03 PROCEDURE — 99284 EMERGENCY DEPT VISIT MOD MDM: CPT

## 2025-07-03 PROCEDURE — 6370000000 HC RX 637 (ALT 250 FOR IP): Performed by: EMERGENCY MEDICINE

## 2025-07-03 PROCEDURE — 93971 EXTREMITY STUDY: CPT

## 2025-07-03 RX ORDER — OXYCODONE AND ACETAMINOPHEN 5; 325 MG/1; MG/1
1 TABLET ORAL EVERY 6 HOURS PRN
Qty: 13 TABLET | Refills: 0 | Status: SHIPPED | OUTPATIENT
Start: 2025-07-03 | End: 2025-07-08

## 2025-07-03 RX ORDER — IBUPROFEN 600 MG/1
600 TABLET, FILM COATED ORAL
Status: COMPLETED | OUTPATIENT
Start: 2025-07-03 | End: 2025-07-03

## 2025-07-03 RX ORDER — OXYCODONE AND ACETAMINOPHEN 5; 325 MG/1; MG/1
1 TABLET ORAL
Refills: 0 | Status: COMPLETED | OUTPATIENT
Start: 2025-07-03 | End: 2025-07-03

## 2025-07-03 RX ORDER — HYDROCODONE BITARTRATE AND ACETAMINOPHEN 5; 325 MG/1; MG/1
1 TABLET ORAL
Refills: 0 | Status: COMPLETED | OUTPATIENT
Start: 2025-07-03 | End: 2025-07-03

## 2025-07-03 RX ADMIN — OXYCODONE AND ACETAMINOPHEN 1 TABLET: 5; 325 TABLET ORAL at 12:18

## 2025-07-03 RX ADMIN — HYDROCODONE BITARTRATE AND ACETAMINOPHEN 1 TABLET: 5; 325 TABLET ORAL at 11:06

## 2025-07-03 RX ADMIN — IBUPROFEN 600 MG: 600 TABLET ORAL at 11:06

## 2025-07-03 ASSESSMENT — PAIN DESCRIPTION - LOCATION: LOCATION: LEG

## 2025-07-03 ASSESSMENT — PAIN SCALES - GENERAL
PAINLEVEL_OUTOF10: 10
PAINLEVEL_OUTOF10: 2
PAINLEVEL_OUTOF10: 10
PAINLEVEL_OUTOF10: 10

## 2025-07-03 ASSESSMENT — PAIN DESCRIPTION - ORIENTATION: ORIENTATION: LEFT

## 2025-07-03 ASSESSMENT — PAIN - FUNCTIONAL ASSESSMENT: PAIN_FUNCTIONAL_ASSESSMENT: 0-10

## 2025-07-03 NOTE — DISCHARGE INSTRUCTIONS
You were seen in the emergency room for your left calf pain.  Thankfully, we are able to rule out a blockage in the artery or a blood clot in your leg.  This is likely caused by the Baker's cyst that we saw on the ultrasound.    For the next week, you can take tylenol or ibuprofen for aches and pains  If needed, you can alternate these every 3 hours so that you always have something on board. Try to take the ibuprofen with food. For instance, you can take tylenol at 9am, ibuprofen at noon with lunch, tylenol again at 3pm and ibuprofen again at 6pm with dinner. Take in plenty of water to stay hydrated and follow up with your orthopedic doctor in the next few days.     Save the narcotic pain medication for severe pain or pain preventing sleep. You can get drowsy or confused with this medication so do not drive or do anything strenuous or complicated while on this medication.    Return to the ER for any new or worsening pain, difficulty breathing, or any other new or concerning symptoms.        Thank you for choosing our Emergency Department for your care.  It is our privilege to care for you in your time of need.  In the next several days, you may receive a survey via email or mailed to your home about your experience with our team.  We would greatly appreciate you taking a few minutes to complete the survey, as we use this information to learn what we have done well and what we could be doing better. Thank you for trusting us with your care!    Below you will find a list of your tests from today's visit.   Labs and Radiology Studies  Recent Results (from the past 12 hours)   Vascular duplex lower extremity venous left    Collection Time: 07/03/25 11:29 AM   Result Value Ref Range    Body Surface Area 2.07 m2     No results found.  ------------------------------------------------------------------------------------------------------------  The evaluation and treatment you received in the Emergency Department were for

## 2025-07-03 NOTE — ED TRIAGE NOTES
Patient came in complaining of left lower calf pain that started Saturday. No hx of blood clots. No injury noted. No CP or SOB     Pt took tylenol at 5am this morning and has been applying ice to the area with minimal relief

## 2025-07-03 NOTE — ED PROVIDER NOTES
DIFFERENTIAL DIAGNOSIS/MDM         10:48 AM Differential and Considerations: 84F w/L calf pain, r/o DVT. Good pulses, no c/f ischemic limb. No trauma to suggest fx, tibia nontender, only calf. Will give ibuprofen, norco for pain. Dispo pending workup.        Records Reviewed (source and summary of external notes): Prior medical records and Nursing notes.    Vitals:    Vitals:    07/03/25 1137 07/03/25 1143 07/03/25 1158 07/03/25 1216   BP:  (!) 196/89 (!) 234/153 (!) 193/70   Pulse: 60 60 67 65   Resp: 18 18 19 20   Temp:       TempSrc:       SpO2: 97% 97% 94% 97%   Weight:       Height:            ED COURSE  ED Course as of 07/03/25 1353   Thu Jul 03, 2025   1242 Vascular duplex lower extremity venous left    07/03/25 11:41:11 2.07       In process by Marko Jaquez (07/03/25 11:30:01)             Initial Result:       ·  No evidence of acute deep vein and superficial thrombosis in the left  lower extremity.  ·  There is a fluid filled area seen in the left popliteal fossa that  could be indicative of a Baker's cyst. Clinical correlation is  recommended. Cyst dimensions are: 1 cm x 0.9 cm.  ·  For comparison purposes, the right common femoral vein was briefly  interrogated. The vein demonstrates normal color filling and  compressibility. Doppler flow was phasic and spontaneous. [YA]   1349 Patient feels improved. Already has known R bonner's cyst, follows with ortho. Will discharge with percocet for breakthrough pain and return precautions. [YA]      ED Course User Index  [YA] Flor Byrd MD       Patient was given the following medications:  Medications   HYDROcodone-acetaminophen (NORCO) 5-325 MG per tablet 1 tablet (1 tablet Oral Given 7/3/25 1106)   ibuprofen (ADVIL;MOTRIN) tablet 600 mg (600 mg Oral Given 7/3/25 1106)   oxyCODONE-acetaminophen (PERCOCET) 5-325 MG per tablet 1 tablet (1 tablet Oral Given 7/3/25 1218)       CONSULTS: See ED Course/MDM for further details.         PROCEDURES   Unless

## 2025-07-07 ENCOUNTER — TELEPHONE (OUTPATIENT)
Facility: CLINIC | Age: 85
End: 2025-07-07

## 2025-07-07 DIAGNOSIS — M71.22 BAKER'S CYST OF KNEE, LEFT: Primary | ICD-10-CM

## 2025-07-08 ENCOUNTER — TELEPHONE (OUTPATIENT)
Facility: CLINIC | Age: 85
End: 2025-07-08

## 2025-07-15 ENCOUNTER — OFFICE VISIT (OUTPATIENT)
Age: 85
End: 2025-07-15
Payer: MEDICARE

## 2025-07-15 VITALS
BODY MASS INDEX: 41.54 KG/M2 | OXYGEN SATURATION: 98 % | DIASTOLIC BLOOD PRESSURE: 84 MMHG | SYSTOLIC BLOOD PRESSURE: 136 MMHG | WEIGHT: 220 LBS | HEIGHT: 61 IN | HEART RATE: 64 BPM

## 2025-07-15 DIAGNOSIS — M71.21 SYNOVIAL CYST OF RIGHT POPLITEAL SPACE: ICD-10-CM

## 2025-07-15 DIAGNOSIS — M17.11 PRIMARY OSTEOARTHRITIS OF RIGHT KNEE: ICD-10-CM

## 2025-07-15 DIAGNOSIS — E08.40 DIABETES MELLITUS DUE TO UNDERLYING CONDITION WITH DIABETIC NEUROPATHY, WITHOUT LONG-TERM CURRENT USE OF INSULIN (HCC): ICD-10-CM

## 2025-07-15 DIAGNOSIS — M17.12: ICD-10-CM

## 2025-07-15 DIAGNOSIS — G63 POLYNEUROPATHY ASSOCIATED WITH UNDERLYING DISEASE: ICD-10-CM

## 2025-07-15 DIAGNOSIS — M17.12 PRIMARY OSTEOARTHRITIS OF LEFT KNEE: Primary | ICD-10-CM

## 2025-07-15 PROCEDURE — G8399 PT W/DXA RESULTS DOCUMENT: HCPCS | Performed by: ORTHOPAEDIC SURGERY

## 2025-07-15 PROCEDURE — G8417 CALC BMI ABV UP PARAM F/U: HCPCS | Performed by: ORTHOPAEDIC SURGERY

## 2025-07-15 PROCEDURE — 1123F ACP DISCUSS/DSCN MKR DOCD: CPT | Performed by: ORTHOPAEDIC SURGERY

## 2025-07-15 PROCEDURE — 1160F RVW MEDS BY RX/DR IN RCRD: CPT | Performed by: ORTHOPAEDIC SURGERY

## 2025-07-15 PROCEDURE — 3075F SYST BP GE 130 - 139MM HG: CPT | Performed by: ORTHOPAEDIC SURGERY

## 2025-07-15 PROCEDURE — 99204 OFFICE O/P NEW MOD 45 MIN: CPT | Performed by: ORTHOPAEDIC SURGERY

## 2025-07-15 PROCEDURE — 3079F DIAST BP 80-89 MM HG: CPT | Performed by: ORTHOPAEDIC SURGERY

## 2025-07-15 PROCEDURE — 1159F MED LIST DOCD IN RCRD: CPT | Performed by: ORTHOPAEDIC SURGERY

## 2025-07-15 PROCEDURE — 1090F PRES/ABSN URINE INCON ASSESS: CPT | Performed by: ORTHOPAEDIC SURGERY

## 2025-07-15 PROCEDURE — 1036F TOBACCO NON-USER: CPT | Performed by: ORTHOPAEDIC SURGERY

## 2025-07-15 PROCEDURE — G8427 DOCREV CUR MEDS BY ELIG CLIN: HCPCS | Performed by: ORTHOPAEDIC SURGERY

## 2025-07-15 RX ORDER — DICLOFENAC SODIUM 75 MG/1
75 TABLET, DELAYED RELEASE ORAL 2 TIMES DAILY
Qty: 60 TABLET | Refills: 3 | Status: SHIPPED | OUTPATIENT
Start: 2025-07-15

## 2025-07-15 ASSESSMENT — PATIENT HEALTH QUESTIONNAIRE - PHQ9
SUM OF ALL RESPONSES TO PHQ QUESTIONS 1-9: 1
1. LITTLE INTEREST OR PLEASURE IN DOING THINGS: NOT AT ALL
SUM OF ALL RESPONSES TO PHQ QUESTIONS 1-9: 1
2. FEELING DOWN, DEPRESSED OR HOPELESS: SEVERAL DAYS
SUM OF ALL RESPONSES TO PHQ QUESTIONS 1-9: 1
SUM OF ALL RESPONSES TO PHQ QUESTIONS 1-9: 1

## 2025-07-15 NOTE — PROGRESS NOTES
evidence of acute deep vein and superficial thrombosis in the left lower extremity.    There is a fluid filled area seen in the left popliteal fossa that could be indicative of a Baker's cyst. Clinical correlation is recommended. Cyst dimensions are: 1 cm x 0.9 cm.    For comparison purposes, the right common femoral vein was briefly interrogated. The vein demonstrates normal color filling and compressibility. Doppler flow was phasic and spontaneous.        X-rays, 4 views, left knee taken today shows moderately severe, to severe degenerative osteoarthritis in all 3 compartments.  On the lateral view the patellofemoral joint shows bone-on-bone contact with osteophytes present.  AP view shows moderately severe degenerative arthritis in the medial compartment associated with a medial tibial osteophyte.  There is about 3 mm of residual space present.  On the right knee there is bone-on-bone contact in the lateral compartment with a genu valgum deformity and lateral joint osteoarthritis.  No primary bone lesions are present.  Patellofemoral joint confirms patellofemoral osteoarthritis.    Radiographic results are shared with the patient and patient's daughter today.  Plan:     I have recommended moist heat over the knee and calf rather than ice which apparently is making it worse.  She got no response from the Kenalog injection by Dr. Zaman several weeks ago.    I recommend diclofenac 75 mg twice daily and this will be called into her pharmacy.    Physical therapy program recommended and [place] physical therapy orders provided today.    I told her if her knee continues to bother her to come back for an additional Kenalog injection or start hyaluronic acid derivatives.  All questions were answered.  Follow-up as needed.

## 2025-07-21 RX ORDER — ALLOPURINOL 100 MG/1
200 TABLET ORAL DAILY
Qty: 180 TABLET | Refills: 2 | Status: SHIPPED | OUTPATIENT
Start: 2025-07-21

## 2025-07-21 RX ORDER — PRAVASTATIN SODIUM 40 MG
40 TABLET ORAL NIGHTLY
Qty: 90 TABLET | Refills: 2 | Status: SHIPPED | OUTPATIENT
Start: 2025-07-21

## 2025-07-22 ENCOUNTER — TELEPHONE (OUTPATIENT)
Facility: CLINIC | Age: 85
End: 2025-07-22

## 2025-07-24 ENCOUNTER — OFFICE VISIT (OUTPATIENT)
Facility: CLINIC | Age: 85
End: 2025-07-24

## 2025-07-24 VITALS
TEMPERATURE: 98.7 F | RESPIRATION RATE: 18 BRPM | BODY MASS INDEX: 40.1 KG/M2 | HEIGHT: 61 IN | SYSTOLIC BLOOD PRESSURE: 135 MMHG | HEART RATE: 67 BPM | OXYGEN SATURATION: 95 % | DIASTOLIC BLOOD PRESSURE: 77 MMHG | WEIGHT: 212.4 LBS

## 2025-07-24 DIAGNOSIS — R20.0 RIGHT LEG NUMBNESS: ICD-10-CM

## 2025-07-24 DIAGNOSIS — R20.0 RIGHT LEG NUMBNESS: Primary | ICD-10-CM

## 2025-07-24 DIAGNOSIS — M25.471 SWELLING OF JOINT, ANKLE, RIGHT: ICD-10-CM

## 2025-07-24 RX ORDER — TIZANIDINE HYDROCHLORIDE 2 MG/1
2 CAPSULE, GELATIN COATED ORAL 2 TIMES DAILY PRN
COMMUNITY
Start: 2025-07-20 | End: 2025-07-25

## 2025-07-24 RX ORDER — METHYLPREDNISOLONE 4 MG/1
TABLET ORAL
Qty: 1 KIT | Refills: 0 | Status: SHIPPED | OUTPATIENT
Start: 2025-07-24 | End: 2025-07-30

## 2025-07-24 RX ORDER — GABAPENTIN 300 MG/1
300 CAPSULE ORAL 3 TIMES DAILY
Qty: 90 CAPSULE | Refills: 1 | Status: SHIPPED | OUTPATIENT
Start: 2025-07-24 | End: 2025-09-22

## 2025-07-24 RX ORDER — ASPIRIN 81 MG
1 TABLET, DELAYED RELEASE (ENTERIC COATED) ORAL DAILY
COMMUNITY

## 2025-07-24 RX ORDER — PSYLLIUM HUSK 3.4 G/5.8G
525 POWDER, FOR SUSPENSION ORAL DAILY
COMMUNITY

## 2025-07-24 SDOH — ECONOMIC STABILITY: FOOD INSECURITY: WITHIN THE PAST 12 MONTHS, THE FOOD YOU BOUGHT JUST DIDN'T LAST AND YOU DIDN'T HAVE MONEY TO GET MORE.: NEVER TRUE

## 2025-07-24 SDOH — ECONOMIC STABILITY: FOOD INSECURITY: WITHIN THE PAST 12 MONTHS, YOU WORRIED THAT YOUR FOOD WOULD RUN OUT BEFORE YOU GOT MONEY TO BUY MORE.: NEVER TRUE

## 2025-07-24 NOTE — PROGRESS NOTES
\"Have you been to the ER, urgent care clinic since your last visit?  Hospitalized since your last visit?\"    YES - When: approximately 07/20/2025 ago.  Where and Why: [facility] for chronic pain of left ankle.    “Have you seen or consulted any other health care providers outside of [facility] since your last visit?”    YES - When: approximately 1 week  ago.  Where and Why: vascular follow up .    Follow up with orthopedics    Chief Complaint   Patient presents with    Leg Pain     Left leg    Toe numbness to right great toe,2nd, and 3rd toe      /77 (BP Site: Left Upper Arm, Patient Position: Sitting, BP Cuff Size: Medium Adult)   Pulse 67   Temp 98.7 °F (37.1 °C) (Temporal)   Resp 18   Ht 1.549 m (5' 1\")   Wt 96.3 kg (212 lb 6.4 oz)   SpO2 95%   BMI 40.13 kg/m²           Click Here for Release of Records Request   
negative - no chest pain
cervical adenopathy.   Skin:     General: Skin is warm and dry.      Findings: No bruising, lesion or rash.   Neurological:      Mental Status: She is alert.      Cranial Nerves: No cranial nerve deficit.      Sensory: No sensory deficit.      Motor: No weakness.      Coordination: Coordination normal.      Gait: Gait normal.      Deep Tendon Reflexes: Reflexes normal.   Psychiatric:         Mood and Affect: Mood normal.         Behavior: Behavior normal.          Assessment & Plan  Assessment & Plan  Right leg numbness   Chronic, worsening (exacerbation), continue current plan pending work up below, medication adherence emphasized, and lifestyle modifications recommended    Orders:    MINOO by IFA w/Reflex; Future    C-Reactive Protein; Future    Rheumatoid Factor; Future    Sedimentation Rate; Future    Uric Acid; Future    gabapentin (NEURONTIN) 300 MG capsule; Take 1 capsule by mouth 3 times daily for 60 days. Max Daily Amount: 900 mg    EMG; Future    Swelling of joint, ankle, right   Chronic, worsening (exacerbation), continue current plan pending work up below, medication adherence emphasized, and lifestyle modifications recommended    Orders:    MINOO by IFA w/Reflex; Future    C-Reactive Protein; Future    Rheumatoid Factor; Future    Sedimentation Rate; Future    Uric Acid; Future    gabapentin (NEURONTIN) 300 MG capsule; Take 1 capsule by mouth 3 times daily for 60 days. Max Daily Amount: 900 mg    EMG; Future        Aspects of this note have been generated using voice recognition software. Despite editing, there may be some syntax errors.      ILEANA Sampson - NP

## 2025-07-31 LAB
CRP SERPL-MCNC: 1 MG/L (ref 0–10)
ERYTHROCYTE [SEDIMENTATION RATE] IN BLOOD BY WESTERGREN METHOD: 29 MM/HR (ref 0–40)
RHEUMATOID FACT SERPL-ACNC: <10 IU/ML
URATE SERPL-MCNC: 4.6 MG/DL (ref 3.1–7.9)

## 2025-08-01 LAB
ANA SER QL IF: POSITIVE
ANA SPECKLED TITR SER: ABNORMAL {TITER}
CENTROMERE B AB SER-ACNC: <0.2 AI (ref 0–0.9)
CHROMATIN AB SERPL-ACNC: <0.2 AI (ref 0–0.9)
DSDNA AB SER-ACNC: <1 IU/ML (ref 0–9)
ENA JO1 AB SER-ACNC: <0.2 AI (ref 0–0.9)
ENA RNP AB SER-ACNC: <0.2 AI (ref 0–0.9)
ENA SCL70 AB SER-ACNC: <0.2 AI (ref 0–0.9)
ENA SM AB SER-ACNC: <0.2 AI (ref 0–0.9)
ENA SS-A AB SER-ACNC: <0.2 AI (ref 0–0.9)
ENA SS-B AB SER-ACNC: <0.2 AI (ref 0–0.9)
LABORATORY COMMENT REPORT: ABNORMAL
Lab: ABNORMAL
Lab: ABNORMAL

## 2025-08-05 ENCOUNTER — TELEPHONE (OUTPATIENT)
Facility: CLINIC | Age: 85
End: 2025-08-05

## 2025-08-05 DIAGNOSIS — M79.604 PAIN OF RIGHT LOWER EXTREMITY: Primary | ICD-10-CM

## 2025-08-08 ENCOUNTER — TELEPHONE (OUTPATIENT)
Facility: CLINIC | Age: 85
End: 2025-08-08

## 2025-08-08 DIAGNOSIS — M25.471 SWELLING OF JOINT, ANKLE, RIGHT: Primary | ICD-10-CM

## 2025-08-13 ENCOUNTER — RESULTS FOLLOW-UP (OUTPATIENT)
Facility: CLINIC | Age: 85
End: 2025-08-13

## 2025-08-13 DIAGNOSIS — R76.8 POSITIVE ANA (ANTINUCLEAR ANTIBODY): Primary | ICD-10-CM
